# Patient Record
Sex: FEMALE | Race: WHITE | Employment: OTHER | URBAN - METROPOLITAN AREA
[De-identification: names, ages, dates, MRNs, and addresses within clinical notes are randomized per-mention and may not be internally consistent; named-entity substitution may affect disease eponyms.]

---

## 2023-10-10 ENCOUNTER — HOSPITAL ENCOUNTER (EMERGENCY)
Age: 58
Discharge: HOME | End: 2023-10-10
Payer: MEDICARE

## 2023-10-10 ENCOUNTER — APPOINTMENT (OUTPATIENT)
Dept: GENERAL RADIOLOGY | Age: 58
End: 2023-10-10
Payer: MEDICARE

## 2023-10-10 VITALS
OXYGEN SATURATION: 93 % | SYSTOLIC BLOOD PRESSURE: 134 MMHG | HEART RATE: 79 BPM | DIASTOLIC BLOOD PRESSURE: 86 MMHG | TEMPERATURE: 98.1 F | RESPIRATION RATE: 16 BRPM

## 2023-10-10 DIAGNOSIS — M79.672 ACUTE PAIN OF LEFT FOOT: Primary | ICD-10-CM

## 2023-10-10 DIAGNOSIS — M25.572 ACUTE LEFT ANKLE PAIN: ICD-10-CM

## 2023-10-10 PROCEDURE — 6370000000 HC RX 637 (ALT 250 FOR IP): Performed by: NURSE PRACTITIONER

## 2023-10-10 PROCEDURE — 99283 EMERGENCY DEPT VISIT LOW MDM: CPT

## 2023-10-10 PROCEDURE — 73610 X-RAY EXAM OF ANKLE: CPT

## 2023-10-10 PROCEDURE — 73630 X-RAY EXAM OF FOOT: CPT

## 2023-10-10 RX ORDER — IBUPROFEN 200 MG
200 TABLET ORAL ONCE
Status: COMPLETED | OUTPATIENT
Start: 2023-10-10 | End: 2023-10-10

## 2023-10-10 RX ORDER — ACETAMINOPHEN 500 MG
500 TABLET ORAL 4 TIMES DAILY PRN
Qty: 28 TABLET | Refills: 0 | Status: SHIPPED | OUTPATIENT
Start: 2023-10-10 | End: 2023-10-17

## 2023-10-10 RX ORDER — IBUPROFEN 600 MG/1
600 TABLET ORAL 3 TIMES DAILY PRN
Qty: 15 TABLET | Refills: 0 | Status: SHIPPED | OUTPATIENT
Start: 2023-10-10 | End: 2023-10-15

## 2023-10-10 RX ORDER — ACETAMINOPHEN 325 MG/1
650 TABLET ORAL ONCE
Status: COMPLETED | OUTPATIENT
Start: 2023-10-10 | End: 2023-10-10

## 2023-10-10 RX ADMIN — ACETAMINOPHEN 650 MG: 325 TABLET ORAL at 14:10

## 2023-10-10 RX ADMIN — IBUPROFEN 200 MG: 200 TABLET, FILM COATED ORAL at 14:10

## 2023-10-10 ASSESSMENT — PAIN DESCRIPTION - LOCATION
LOCATION: FOOT
LOCATION: FOOT

## 2023-10-10 ASSESSMENT — PAIN DESCRIPTION - FREQUENCY: FREQUENCY: CONTINUOUS

## 2023-10-10 ASSESSMENT — PAIN SCALES - GENERAL
PAINLEVEL_OUTOF10: 7
PAINLEVEL_OUTOF10: 10
PAINLEVEL_OUTOF10: 10

## 2023-10-10 ASSESSMENT — PAIN DESCRIPTION - PAIN TYPE: TYPE: ACUTE PAIN

## 2023-10-10 ASSESSMENT — PAIN DESCRIPTION - ORIENTATION
ORIENTATION: LEFT
ORIENTATION: LEFT

## 2023-10-10 ASSESSMENT — PAIN DESCRIPTION - DESCRIPTORS
DESCRIPTORS: SHARP;PRESSURE
DESCRIPTORS: DISCOMFORT

## 2023-10-10 ASSESSMENT — PAIN - FUNCTIONAL ASSESSMENT: PAIN_FUNCTIONAL_ASSESSMENT: 0-10

## 2023-10-10 NOTE — ED NOTES
A post op shoe was put on patients Left foot. Patient was also given crutches and verbalized understanding and also demonstrated how to use them.       Hudson Fabry, LPN  31/19/55 6873

## 2023-10-10 NOTE — DISCHARGE INSTRUCTIONS
Return to the emergency department for new or worsening symptoms including not limited to, developing worsening pain not relieved by medications, change in the color or temperature of your toes, loss sensation in your leg/foot, other symptoms/concerns. Medications as prescribed. Ensure that you eat prior to taking ibuprofen as this is an NSAID medication that can otherwise cause gastrointestinal bleeding/stomach ulcers if taken on empty stomach. Utilize RICE. Weightbearing as tolerated. Follow-up with your orthopedic specialist or with the provided orthopedic specialist for symptoms that worsen or fail to improve in the next 1 week.

## 2023-10-10 NOTE — ED NOTES
Discharge and education instructions reviewed. Patient verbalized understanding, teach-back successful. Patient denied questions at this time. No acute distress noted. Patient instructed to follow-up as noted - return to emergency department if symptoms worsen. Patient verbalized understanding. Discharged per EDMD with discharge instructions.        Stanislav Figueroa LPN  05/62/13 9554

## 2023-10-11 NOTE — ED PROVIDER NOTES
[address] [facility] [address] [address]        Pt Name: Anya Beyer  MRN: [id number]  [address] [facility] 1965  Date of evaluation: 10/10/2023  Provider: MARY Yadav CNP  PCP: Faustino Blankenship  Note Started: 9:48 PM EDT 10/10/23      MATA. I have evaluated this patient. CHIEF COMPLAINT       Chief Complaint   Patient presents with    Foot Injury     Left foot pain       HISTORY OF PRESENT ILLNESS: 1 or more Elements     History from : Patient    Limitations to history : None    Anya Beyer is a 61 y.o. nontoxic, well-appearing female who presents to the [facility] from for evaluation of left foot and ankle pain status post she jammed her foot against a piece of furniture. Accompanying symptoms include gait disturbance. She denies falling, head injury, loss of consciousness, neck or back pain, other symptoms/concerns. Pain radiates up from her left foot to knee. Nursing Notes were all reviewed and agreed with or any disagreements were addressed in the HPI. REVIEW OF SYSTEMS :      Review of Systems   Constitutional:  Negative for chills, diaphoresis, fatigue and fever. HENT:  Negative for congestion and sore throat. Eyes:  Negative for pain and visual disturbance. Respiratory:  Negative for cough and shortness of breath. Cardiovascular:  Negative for chest pain and leg swelling. Gastrointestinal:  Negative for abdominal pain, anal bleeding, nausea and vomiting. Genitourinary:  Negative for difficulty urinating, dysuria, frequency and urgency. Musculoskeletal:  Positive for arthralgias and gait problem. Negative for back pain and neck pain. Skin:  Negative for rash and wound. Neurological:  Negative for dizziness and light-headedness. Hematological: Negative. Psychiatric/Behavioral:  Positive for agitation. Positives and Pertinent negatives as per HPI.      SURGICAL HISTORY     Past Surgical History:   Procedure

## 2023-10-14 ASSESSMENT — ENCOUNTER SYMPTOMS
EYE PAIN: 0
BACK PAIN: 0
ABDOMINAL PAIN: 0
COUGH: 0
SHORTNESS OF BREATH: 0
ANAL BLEEDING: 0
SORE THROAT: 0
VOMITING: 0
NAUSEA: 0

## 2023-10-27 ENCOUNTER — OFFICE VISIT (OUTPATIENT)
Dept: INTERNAL MEDICINE CLINIC | Age: 58
End: 2023-10-27
Payer: MEDICARE

## 2023-10-27 VITALS
SYSTOLIC BLOOD PRESSURE: 161 MMHG | HEART RATE: 63 BPM | HEIGHT: 69 IN | DIASTOLIC BLOOD PRESSURE: 100 MMHG | BODY MASS INDEX: 21.77 KG/M2 | WEIGHT: 147 LBS

## 2023-10-27 DIAGNOSIS — F41.1 GAD (GENERALIZED ANXIETY DISORDER): ICD-10-CM

## 2023-10-27 DIAGNOSIS — Z98.890 S/P ARTHROSCOPY OF SHOULDER: ICD-10-CM

## 2023-10-27 DIAGNOSIS — M19.019 ACROMIOCLAVICULAR JOINT ARTHRITIS, UNSPECIFIED LATERALITY: Primary | ICD-10-CM

## 2023-10-27 PROCEDURE — G8420 CALC BMI NORM PARAMETERS: HCPCS | Performed by: STUDENT IN AN ORGANIZED HEALTH CARE EDUCATION/TRAINING PROGRAM

## 2023-10-27 PROCEDURE — 1036F TOBACCO NON-USER: CPT | Performed by: STUDENT IN AN ORGANIZED HEALTH CARE EDUCATION/TRAINING PROGRAM

## 2023-10-27 PROCEDURE — G8484 FLU IMMUNIZE NO ADMIN: HCPCS | Performed by: STUDENT IN AN ORGANIZED HEALTH CARE EDUCATION/TRAINING PROGRAM

## 2023-10-27 PROCEDURE — 99213 OFFICE O/P EST LOW 20 MIN: CPT | Performed by: STUDENT IN AN ORGANIZED HEALTH CARE EDUCATION/TRAINING PROGRAM

## 2023-10-27 PROCEDURE — 3017F COLORECTAL CA SCREEN DOC REV: CPT | Performed by: STUDENT IN AN ORGANIZED HEALTH CARE EDUCATION/TRAINING PROGRAM

## 2023-10-27 PROCEDURE — G8427 DOCREV CUR MEDS BY ELIG CLIN: HCPCS | Performed by: STUDENT IN AN ORGANIZED HEALTH CARE EDUCATION/TRAINING PROGRAM

## 2023-10-27 RX ORDER — OXYCODONE HYDROCHLORIDE AND ACETAMINOPHEN 5; 325 MG/1; MG/1
1-2 TABLET ORAL EVERY 8 HOURS PRN
Qty: 90 TABLET | Refills: 0 | Status: SHIPPED | OUTPATIENT
Start: 2023-10-27 | End: 2023-11-26

## 2023-10-27 RX ORDER — RIZATRIPTAN BENZOATE 10 MG/1
10 TABLET ORAL DAILY PRN
Qty: 30 TABLET | Refills: 4 | Status: SHIPPED | OUTPATIENT
Start: 2023-10-27

## 2023-10-27 RX ORDER — CLONIDINE HYDROCHLORIDE 0.3 MG/1
0.3 TABLET ORAL 2 TIMES DAILY
Qty: 60 TABLET | Refills: 11 | Status: SHIPPED | OUTPATIENT
Start: 2023-10-27

## 2023-10-27 RX ORDER — GABAPENTIN 600 MG/1
600 TABLET ORAL 3 TIMES DAILY
Qty: 90 TABLET | Refills: 11 | Status: SHIPPED | OUTPATIENT
Start: 2023-10-27 | End: 2024-10-21

## 2023-10-27 RX ORDER — METHOCARBAMOL 500 MG/1
500 TABLET, FILM COATED ORAL 4 TIMES DAILY
Qty: 120 TABLET | Refills: 5 | Status: SHIPPED | OUTPATIENT
Start: 2023-10-27

## 2023-10-27 RX ORDER — IBUPROFEN 600 MG/1
600 TABLET ORAL 3 TIMES DAILY PRN
Qty: 15 TABLET | Refills: 0 | Status: SHIPPED | OUTPATIENT
Start: 2023-10-27 | End: 2023-11-01

## 2023-10-27 RX ORDER — NAPROXEN 500 MG/1
500 TABLET ORAL 2 TIMES DAILY
Qty: 20 TABLET | Refills: 0 | Status: CANCELLED | OUTPATIENT
Start: 2023-10-27 | End: 2023-11-06

## 2023-10-27 RX ORDER — PREDNISONE 10 MG/1
TABLET ORAL
Qty: 42 TABLET | Refills: 0 | Status: SHIPPED | OUTPATIENT
Start: 2023-10-27 | End: 2023-11-15

## 2023-10-27 RX ORDER — AMITRIPTYLINE HYDROCHLORIDE 50 MG/1
50 TABLET, FILM COATED ORAL NIGHTLY
Qty: 30 TABLET | Refills: 11 | Status: SHIPPED | OUTPATIENT
Start: 2023-10-27 | End: 2023-10-27

## 2023-10-27 RX ORDER — DIAZEPAM 5 MG/1
5 TABLET ORAL EVERY 8 HOURS PRN
Qty: 90 TABLET | Refills: 2 | Status: SHIPPED | OUTPATIENT
Start: 2023-10-27 | End: 2023-11-30 | Stop reason: SDUPTHER

## 2023-10-27 RX ORDER — NORTRIPTYLINE HYDROCHLORIDE 25 MG/1
25 CAPSULE ORAL NIGHTLY
Qty: 90 CAPSULE | Refills: 3 | Status: SHIPPED | OUTPATIENT
Start: 2023-10-27

## 2023-10-27 SDOH — ECONOMIC STABILITY: HOUSING INSECURITY
IN THE LAST 12 MONTHS, WAS THERE A TIME WHEN YOU DID NOT HAVE A STEADY PLACE TO SLEEP OR SLEPT IN A SHELTER (INCLUDING NOW)?: NO

## 2023-10-27 SDOH — ECONOMIC STABILITY: INCOME INSECURITY: HOW HARD IS IT FOR YOU TO PAY FOR THE VERY BASICS LIKE FOOD, HOUSING, MEDICAL CARE, AND HEATING?: NOT HARD AT ALL

## 2023-10-27 SDOH — ECONOMIC STABILITY: FOOD INSECURITY: WITHIN THE PAST 12 MONTHS, THE FOOD YOU BOUGHT JUST DIDN'T LAST AND YOU DIDN'T HAVE MONEY TO GET MORE.: NEVER TRUE

## 2023-10-27 SDOH — ECONOMIC STABILITY: FOOD INSECURITY: WITHIN THE PAST 12 MONTHS, YOU WORRIED THAT YOUR FOOD WOULD RUN OUT BEFORE YOU GOT MONEY TO BUY MORE.: NEVER TRUE

## 2023-10-27 ASSESSMENT — PATIENT HEALTH QUESTIONNAIRE - PHQ9
SUM OF ALL RESPONSES TO PHQ QUESTIONS 1-9: 0
1. LITTLE INTEREST OR PLEASURE IN DOING THINGS: 0
SUM OF ALL RESPONSES TO PHQ QUESTIONS 1-9: 0
SUM OF ALL RESPONSES TO PHQ9 QUESTIONS 1 & 2: 0
SUM OF ALL RESPONSES TO PHQ QUESTIONS 1-9: 0
2. FEELING DOWN, DEPRESSED OR HOPELESS: 0
SUM OF ALL RESPONSES TO PHQ QUESTIONS 1-9: 0

## 2023-10-27 ASSESSMENT — ENCOUNTER SYMPTOMS
BACK PAIN: 0
DIARRHEA: 0
VOMITING: 0
NAUSEA: 0
COUGH: 0
CONSTIPATION: 0
ABDOMINAL PAIN: 0
CHEST TIGHTNESS: 0
SORE THROAT: 0

## 2023-10-27 NOTE — PROGRESS NOTES
[facility]) Internal Medicine    Maricruz Dickey is a 61 y.o. female with the past medical history as listed below presenting to the clinic for follow up on chronic condition and discussion of preventative health care  She has chronic pain from multiple surgeries. She is does have ankle pain and is currently wearing a boot. She reports she has surgery to her ankle and has hardware in her ankle. She also notes anxiety and panic attacks. Review of Systems   Constitutional:  Negative for chills, fatigue and fever. HENT:  Negative for congestion, ear pain and sore throat. Respiratory:  Negative for cough and chest tightness. Cardiovascular:  Negative for chest pain, palpitations and leg swelling. Gastrointestinal:  Negative for abdominal pain, constipation, diarrhea, nausea and vomiting. Genitourinary:  Negative for dysuria, flank pain and urgency. Musculoskeletal:  Negative for arthralgias, back pain and joint swelling. Skin:  Negative for rash. Neurological:  Negative for dizziness, weakness and numbness. Psychiatric/Behavioral:  Negative for sleep disturbance. The patient is not nervous/anxious.         Past Medical History:   Diagnosis Date    Anxiety     Arthritis     Asthma     Hypertension     Neuropathy        Past Surgical History:   Procedure Laterality Date     SECTION      SHOULDER ARTHROSCOPY Right 10/28/15    labral debridement open shanna & decompression    TONSILLECTOMY         Social History     Socioeconomic History    Marital status:      Spouse name: Not on file    Number of children: 4    Years of education: Not on file    Highest education level: Not on file   Occupational History    Occupation: Disabled   Tobacco Use    Smoking status: Never    Smokeless tobacco: Not on file   Substance and Sexual Activity    Alcohol use: No     Alcohol/week: 0.0 standard drinks of alcohol    Drug use: No    Sexual activity: Not on file   Other Topics Concern    Not on file

## 2023-11-09 DIAGNOSIS — F41.1 GAD (GENERALIZED ANXIETY DISORDER): ICD-10-CM

## 2023-11-09 DIAGNOSIS — M19.019 ACROMIOCLAVICULAR JOINT ARTHRITIS, UNSPECIFIED LATERALITY: ICD-10-CM

## 2023-11-09 DIAGNOSIS — Z98.890 S/P ARTHROSCOPY OF SHOULDER: ICD-10-CM

## 2023-11-09 RX ORDER — OXYCODONE HYDROCHLORIDE AND ACETAMINOPHEN 5; 325 MG/1; MG/1
1-2 TABLET ORAL EVERY 8 HOURS PRN
Qty: 90 TABLET | Refills: 0 | Status: CANCELLED | OUTPATIENT
Start: 2023-11-09 | End: 2023-12-09

## 2023-11-09 NOTE — TELEPHONE ENCOUNTER
Pt is requesting refills for:    oxyCODONE-acetaminophen (PERCOCET) 5-325 MG per tablet    diazePAM (VALIUM) 5 MG tablet    Please send to [facility]

## 2023-11-15 PROBLEM — F33.1 MAJOR DEPRESSIVE DISORDER, RECURRENT, MODERATE (HCC): Status: ACTIVE | Noted: 2023-11-15

## 2023-11-15 PROBLEM — F33.9 MAJOR DEPRESSIVE DISORDER, RECURRENT, UNSPECIFIED (HCC): Status: ACTIVE | Noted: 2023-11-15

## 2023-11-15 PROBLEM — F33.0 MAJOR DEPRESSIVE DISORDER, RECURRENT, MILD (HCC): Status: ACTIVE | Noted: 2023-11-15

## 2023-11-30 DIAGNOSIS — Z98.890 S/P ARTHROSCOPY OF SHOULDER: ICD-10-CM

## 2023-11-30 DIAGNOSIS — M19.019 ACROMIOCLAVICULAR JOINT ARTHRITIS, UNSPECIFIED LATERALITY: ICD-10-CM

## 2023-11-30 RX ORDER — DIAZEPAM 5 MG/1
5 TABLET ORAL EVERY 8 HOURS PRN
Qty: 90 TABLET | Refills: 2 | Status: SHIPPED | OUTPATIENT
Start: 2023-11-30 | End: 2024-02-28

## 2023-11-30 RX ORDER — OXYCODONE AND ACETAMINOPHEN 10; 325 MG/1; MG/1
1 TABLET ORAL EVERY 6 HOURS PRN
Qty: 90 TABLET | Refills: 0 | OUTPATIENT
Start: 2023-11-30 | End: 2023-12-30

## 2023-11-30 NOTE — TELEPHONE ENCOUNTER
----- Message from Angela Suh sent at 11/29/2023  4:27 PM EST -----  Subject: Refill Request    QUESTIONS  Name of Medication? oxyCODONE-acetaminophen (ENDOCET)  MG per tablet  Patient-reported dosage and instructions? 10MG  How many days do you have left? 1  Preferred Pharmacy? [facility] [id number]  Pharmacy phone number (if available)? [phone]  ---------------------------------------------------------------------------  --------------  Austin LOPEZ  What is the best way for the office to contact you? OK to leave message on   voicemail  Preferred Call Back Phone Number? [phone]  ---------------------------------------------------------------------------  --------------  SCRIPT ANSWERS  Relationship to Patient?  Self

## 2023-12-05 ENCOUNTER — OFFICE VISIT (OUTPATIENT)
Dept: INTERNAL MEDICINE CLINIC | Age: 58
End: 2023-12-05
Payer: MEDICARE

## 2023-12-05 VITALS — HEART RATE: 70 BPM | OXYGEN SATURATION: 94 % | SYSTOLIC BLOOD PRESSURE: 130 MMHG | DIASTOLIC BLOOD PRESSURE: 90 MMHG

## 2023-12-05 DIAGNOSIS — M51.36 DEGENERATIVE DISC DISEASE, LUMBAR: ICD-10-CM

## 2023-12-05 DIAGNOSIS — Z98.890 S/P ARTHROSCOPY OF SHOULDER: ICD-10-CM

## 2023-12-05 DIAGNOSIS — M19.019 ACROMIOCLAVICULAR JOINT ARTHRITIS, UNSPECIFIED LATERALITY: ICD-10-CM

## 2023-12-05 DIAGNOSIS — M79.605 CHRONIC PAIN OF LEFT LOWER EXTREMITY: Primary | ICD-10-CM

## 2023-12-05 DIAGNOSIS — I10 PRIMARY HYPERTENSION: ICD-10-CM

## 2023-12-05 DIAGNOSIS — G89.29 CHRONIC PAIN OF LEFT LOWER EXTREMITY: Primary | ICD-10-CM

## 2023-12-05 PROBLEM — M51.369 DEGENERATIVE DISC DISEASE, LUMBAR: Status: ACTIVE | Noted: 2023-12-05

## 2023-12-05 PROCEDURE — G8484 FLU IMMUNIZE NO ADMIN: HCPCS | Performed by: STUDENT IN AN ORGANIZED HEALTH CARE EDUCATION/TRAINING PROGRAM

## 2023-12-05 PROCEDURE — G8427 DOCREV CUR MEDS BY ELIG CLIN: HCPCS | Performed by: STUDENT IN AN ORGANIZED HEALTH CARE EDUCATION/TRAINING PROGRAM

## 2023-12-05 PROCEDURE — 99213 OFFICE O/P EST LOW 20 MIN: CPT | Performed by: STUDENT IN AN ORGANIZED HEALTH CARE EDUCATION/TRAINING PROGRAM

## 2023-12-05 PROCEDURE — G8420 CALC BMI NORM PARAMETERS: HCPCS | Performed by: STUDENT IN AN ORGANIZED HEALTH CARE EDUCATION/TRAINING PROGRAM

## 2023-12-05 PROCEDURE — 3017F COLORECTAL CA SCREEN DOC REV: CPT | Performed by: STUDENT IN AN ORGANIZED HEALTH CARE EDUCATION/TRAINING PROGRAM

## 2023-12-05 PROCEDURE — 3075F SYST BP GE 130 - 139MM HG: CPT | Performed by: STUDENT IN AN ORGANIZED HEALTH CARE EDUCATION/TRAINING PROGRAM

## 2023-12-05 PROCEDURE — 3080F DIAST BP >= 90 MM HG: CPT | Performed by: STUDENT IN AN ORGANIZED HEALTH CARE EDUCATION/TRAINING PROGRAM

## 2023-12-05 PROCEDURE — 1036F TOBACCO NON-USER: CPT | Performed by: STUDENT IN AN ORGANIZED HEALTH CARE EDUCATION/TRAINING PROGRAM

## 2023-12-05 RX ORDER — CLONIDINE HYDROCHLORIDE 0.3 MG/1
0.3 TABLET ORAL 2 TIMES DAILY
Qty: 60 TABLET | Refills: 11 | Status: SHIPPED | OUTPATIENT
Start: 2023-12-05

## 2023-12-05 RX ORDER — OXYCODONE HYDROCHLORIDE 10 MG/1
10 TABLET ORAL EVERY 6 HOURS PRN
Qty: 120 TABLET | Refills: 0 | Status: SHIPPED | OUTPATIENT
Start: 2023-12-05 | End: 2024-01-04

## 2023-12-05 RX ORDER — OXYCODONE HYDROCHLORIDE 10 MG/1
10 TABLET ORAL EVERY 6 HOURS PRN
Qty: 120 TABLET | Refills: 0 | Status: SHIPPED | OUTPATIENT
Start: 2023-12-13 | End: 2023-12-05 | Stop reason: SDUPTHER

## 2023-12-05 ASSESSMENT — ENCOUNTER SYMPTOMS
VOMITING: 0
BACK PAIN: 0
DIARRHEA: 0
NAUSEA: 0
ABDOMINAL PAIN: 0
CONSTIPATION: 0
SORE THROAT: 0
COUGH: 0
CHEST TIGHTNESS: 0

## 2023-12-05 NOTE — PROGRESS NOTES
[facility]) Internal Medicine    Mauri Rosa is a 61 y.o. female with the past medical history as listed below presenting to the clinic for follow up on chronic condition and discussion of preventative health care. She continues to have diffuse pain. She has pain in left foot and wears a boot. She has not followed with ortho [facility] to remove boot because she owes them money. She has chronic back pain and reports having a oscar in her back. She is adamant that she will not go to pain management. She reports he blood pressure has been elevated due to her pain. She is worried that she will have a stroke. Review of Systems   Constitutional:  Negative for chills, fatigue and fever. HENT:  Negative for congestion, ear pain and sore throat. Respiratory:  Negative for cough and chest tightness. Cardiovascular:  Negative for chest pain, palpitations and leg swelling. Gastrointestinal:  Negative for abdominal pain, constipation, diarrhea, nausea and vomiting. Genitourinary:  Negative for dysuria, flank pain and urgency. Musculoskeletal:  Negative for arthralgias, back pain and joint swelling. Skin:  Negative for rash. Neurological:  Negative for dizziness, weakness and numbness. Psychiatric/Behavioral:  Negative for sleep disturbance. The patient is not nervous/anxious.         Past Medical History:   Diagnosis Date    Anxiety     Arthritis     Asthma     Hypertension     Neuropathy        Past Surgical History:   Procedure Laterality Date     SECTION      SHOULDER ARTHROSCOPY Right 10/28/15    labral debridement open shanna & decompression    TONSILLECTOMY         Social History     Socioeconomic History    Marital status:      Spouse name: Not on file    Number of children: 4    Years of education: Not on file    Highest education level: Not on file   Occupational History    Occupation: Disabled   Tobacco Use    Smoking status: Never    Smokeless tobacco: Not on file   Substance and
No

## 2023-12-08 ENCOUNTER — TELEPHONE (OUTPATIENT)
Dept: INTERNAL MEDICINE CLINIC | Age: 58
End: 2023-12-08

## 2023-12-08 NOTE — TELEPHONE ENCOUNTER
Pt called, needs new Rx for her diazePAM (VALIUM) 5 MG tablet, needs to say she is taking 30mg per day    Also needs a refill for her nortriptyline (PAMELOR) 25 MG capsule, would like to Rx changes to 50mg at night instead     Please advise    Please send to [facility] in [place]

## 2023-12-11 NOTE — TELEPHONE ENCOUNTER
She's about 14 days too early. The prescriptions is written for 5 mg 3 times a day which is 15 mg total per day.

## 2023-12-22 ENCOUNTER — OFFICE VISIT (OUTPATIENT)
Dept: INTERNAL MEDICINE CLINIC | Age: 58
End: 2023-12-22
Payer: MEDICARE

## 2023-12-22 VITALS
SYSTOLIC BLOOD PRESSURE: 118 MMHG | HEIGHT: 69 IN | BODY MASS INDEX: 22.22 KG/M2 | WEIGHT: 150 LBS | HEART RATE: 59 BPM | OXYGEN SATURATION: 94 % | DIASTOLIC BLOOD PRESSURE: 84 MMHG

## 2023-12-22 DIAGNOSIS — M51.36 DEGENERATIVE DISC DISEASE, LUMBAR: ICD-10-CM

## 2023-12-22 DIAGNOSIS — Z98.890 S/P ARTHROSCOPY OF SHOULDER: ICD-10-CM

## 2023-12-22 DIAGNOSIS — M19.019 ACROMIOCLAVICULAR JOINT ARTHRITIS, UNSPECIFIED LATERALITY: ICD-10-CM

## 2023-12-22 DIAGNOSIS — M79.605 CHRONIC PAIN OF LEFT LOWER EXTREMITY: ICD-10-CM

## 2023-12-22 DIAGNOSIS — G89.29 CHRONIC PAIN OF LEFT LOWER EXTREMITY: ICD-10-CM

## 2023-12-22 DIAGNOSIS — G43.109 MIGRAINE WITH AURA AND WITHOUT STATUS MIGRAINOSUS, NOT INTRACTABLE: Primary | ICD-10-CM

## 2023-12-22 DIAGNOSIS — F41.1 GAD (GENERALIZED ANXIETY DISORDER): ICD-10-CM

## 2023-12-22 PROCEDURE — 99213 OFFICE O/P EST LOW 20 MIN: CPT | Performed by: STUDENT IN AN ORGANIZED HEALTH CARE EDUCATION/TRAINING PROGRAM

## 2023-12-22 PROCEDURE — 3074F SYST BP LT 130 MM HG: CPT | Performed by: STUDENT IN AN ORGANIZED HEALTH CARE EDUCATION/TRAINING PROGRAM

## 2023-12-22 PROCEDURE — 3017F COLORECTAL CA SCREEN DOC REV: CPT | Performed by: STUDENT IN AN ORGANIZED HEALTH CARE EDUCATION/TRAINING PROGRAM

## 2023-12-22 PROCEDURE — G8484 FLU IMMUNIZE NO ADMIN: HCPCS | Performed by: STUDENT IN AN ORGANIZED HEALTH CARE EDUCATION/TRAINING PROGRAM

## 2023-12-22 PROCEDURE — G8427 DOCREV CUR MEDS BY ELIG CLIN: HCPCS | Performed by: STUDENT IN AN ORGANIZED HEALTH CARE EDUCATION/TRAINING PROGRAM

## 2023-12-22 PROCEDURE — 3079F DIAST BP 80-89 MM HG: CPT | Performed by: STUDENT IN AN ORGANIZED HEALTH CARE EDUCATION/TRAINING PROGRAM

## 2023-12-22 PROCEDURE — 1036F TOBACCO NON-USER: CPT | Performed by: STUDENT IN AN ORGANIZED HEALTH CARE EDUCATION/TRAINING PROGRAM

## 2023-12-22 PROCEDURE — G8420 CALC BMI NORM PARAMETERS: HCPCS | Performed by: STUDENT IN AN ORGANIZED HEALTH CARE EDUCATION/TRAINING PROGRAM

## 2023-12-22 RX ORDER — CLONIDINE HYDROCHLORIDE 0.3 MG/1
0.3 TABLET ORAL 2 TIMES DAILY
Qty: 60 TABLET | Refills: 11 | Status: SHIPPED | OUTPATIENT
Start: 2023-12-22

## 2023-12-22 RX ORDER — METHOCARBAMOL 500 MG/1
500 TABLET, FILM COATED ORAL 4 TIMES DAILY
Qty: 120 TABLET | Refills: 5 | Status: CANCELLED | OUTPATIENT
Start: 2023-12-22

## 2023-12-22 RX ORDER — DIAZEPAM 5 MG/1
7.5 TABLET ORAL EVERY 8 HOURS PRN
Qty: 120 TABLET | Refills: 2 | Status: SHIPPED | OUTPATIENT
Start: 2023-12-22 | End: 2024-03-21

## 2023-12-22 RX ORDER — GABAPENTIN 600 MG/1
600 TABLET ORAL 3 TIMES DAILY
Qty: 90 TABLET | Refills: 11 | Status: SHIPPED | OUTPATIENT
Start: 2023-12-22 | End: 2024-12-16

## 2023-12-22 RX ORDER — NORTRIPTYLINE HYDROCHLORIDE 25 MG/1
25 CAPSULE ORAL NIGHTLY
Qty: 90 CAPSULE | Refills: 3 | Status: SHIPPED | OUTPATIENT
Start: 2023-12-22

## 2023-12-22 RX ORDER — OXYCODONE HYDROCHLORIDE 10 MG/1
10 TABLET ORAL EVERY 6 HOURS PRN
Qty: 120 TABLET | Refills: 0 | Status: CANCELLED | OUTPATIENT
Start: 2023-12-22 | End: 2024-01-21

## 2023-12-22 RX ORDER — IBUPROFEN 600 MG/1
600 TABLET ORAL 3 TIMES DAILY PRN
Qty: 60 TABLET | Refills: 0 | Status: SHIPPED | OUTPATIENT
Start: 2023-12-22 | End: 2024-01-21

## 2023-12-22 RX ORDER — RIZATRIPTAN BENZOATE 10 MG/1
10 TABLET ORAL DAILY PRN
Qty: 30 TABLET | Refills: 4 | Status: SHIPPED | OUTPATIENT
Start: 2023-12-22

## 2023-12-29 ENCOUNTER — TELEPHONE (OUTPATIENT)
Dept: INTERNAL MEDICINE CLINIC | Age: 58
End: 2023-12-29

## 2023-12-29 NOTE — TELEPHONE ENCOUNTER
Pt was transferred through nurse triage line for head pain and suicidal thoughts. I spoke with pt and she is refusing to get to the ER just because she \"can't do it anymore\" everything is a mess per pt.      Asked her if she is hurting herself or having thoughts pt denied this with me. Told pt I will call Squad if she is having suicidal thoughts and she denied. Pts friend Aleshia who is also a pt here said pt hasn't ate more than 3 french fries today and doesn't have an appetite after losing her .       Pt wants to know if she can come in next Friday 1/5/24 at 2:30 with Aleshia Burr? Told pt there were no opening next week with PCP so I would have to ask. Pt is unsure why she would be coming in. I told pt she needs to make sure she is around family and friends and finding some support groups.      Are we able to see pt next Friday at 230?

## 2023-12-30 NOTE — PROGRESS NOTES
[facility]) Internal Medicine    Marilny Gallagher is a 60 y.o. female with the past medical history as listed below presenting to the clinic for follow up on chronic condition and discussion of preventative health care. She continues to have diffuse pain. She has pain in left foot and wears a boot. She has not followed with ortho [facility] to remove boot because she owes them money. She has chronic back pain and reports having a oscar in her back. She is adamant that she will not go to pain management. She reports he blood pressure has been elevated due to her pain. She is worried that she will have a stroke. Review of Systems   Constitutional:  Negative for chills, fatigue and fever. HENT:  Negative for congestion, ear pain and sore throat. Respiratory:  Negative for cough and chest tightness. Cardiovascular:  Negative for chest pain, palpitations and leg swelling. Gastrointestinal:  Negative for abdominal pain, constipation, diarrhea, nausea and vomiting. Genitourinary:  Negative for dysuria, flank pain and urgency. Musculoskeletal:  Negative for arthralgias, back pain and joint swelling. Skin:  Negative for rash. Neurological:  Negative for dizziness, weakness and numbness. Psychiatric/Behavioral:  Negative for sleep disturbance. The patient is not nervous/anxious.         Past Medical History:   Diagnosis Date    Anxiety     Arthritis     Asthma     Hypertension     Neuropathy        Past Surgical History:   Procedure Laterality Date     SECTION      SHOULDER ARTHROSCOPY Right 10/28/15    labral debridement open shanna & decompression    TONSILLECTOMY         Social History     Socioeconomic History    Marital status:      Spouse name: Not on file    Number of children: 4    Years of education: Not on file    Highest education level: Not on file   Occupational History    Occupation: Disabled   Tobacco Use    Smoking status: Never    Smokeless tobacco: Never   Substance and Sexual

## 2024-01-02 DIAGNOSIS — M19.019 ACROMIOCLAVICULAR JOINT ARTHRITIS, UNSPECIFIED LATERALITY: ICD-10-CM

## 2024-01-02 DIAGNOSIS — M79.605 CHRONIC PAIN OF LEFT LOWER EXTREMITY: ICD-10-CM

## 2024-01-02 DIAGNOSIS — Z98.890 S/P ARTHROSCOPY OF SHOULDER: ICD-10-CM

## 2024-01-02 DIAGNOSIS — M51.36 DEGENERATIVE DISC DISEASE, LUMBAR: ICD-10-CM

## 2024-01-02 DIAGNOSIS — G89.29 CHRONIC PAIN OF LEFT LOWER EXTREMITY: ICD-10-CM

## 2024-01-02 RX ORDER — OXYCODONE HYDROCHLORIDE 10 MG/1
10 TABLET ORAL EVERY 6 HOURS PRN
Qty: 120 TABLET | Refills: 0 | Status: SHIPPED | OUTPATIENT
Start: 2024-01-02 | End: 2024-02-01

## 2024-01-03 DIAGNOSIS — G89.29 CHRONIC PAIN OF LEFT LOWER EXTREMITY: ICD-10-CM

## 2024-01-03 DIAGNOSIS — Z98.890 S/P ARTHROSCOPY OF SHOULDER: ICD-10-CM

## 2024-01-03 DIAGNOSIS — F41.1 GAD (GENERALIZED ANXIETY DISORDER): ICD-10-CM

## 2024-01-03 DIAGNOSIS — M51.36 DEGENERATIVE DISC DISEASE, LUMBAR: ICD-10-CM

## 2024-01-03 DIAGNOSIS — M79.605 CHRONIC PAIN OF LEFT LOWER EXTREMITY: ICD-10-CM

## 2024-01-03 DIAGNOSIS — G43.109 MIGRAINE WITH AURA AND WITHOUT STATUS MIGRAINOSUS, NOT INTRACTABLE: ICD-10-CM

## 2024-01-03 DIAGNOSIS — M19.019 ACROMIOCLAVICULAR JOINT ARTHRITIS, UNSPECIFIED LATERALITY: ICD-10-CM

## 2024-01-03 NOTE — TELEPHONE ENCOUNTER
----- Message from Muna Murdock sent at 1/2/2024  5:24 PM EST -----  Subject: Message to Provider    QUESTIONS  Information for Provider? Atul rodriguezan Aleshia was calling to   let Provider Iraj know that He can call this number back for   medication refills due to patient gave wrong number earlier. Please call,   thanks  ---------------------------------------------------------------------------  --------------  CALL BACK INFO  [phone]; OK to leave message on voicemail  ---------------------------------------------------------------------------  --------------  SCRIPT ANSWERS  Relationship to Patient? Guardian  Representative Name? Lesia LAUREANO  Is the representative on the Communication Release of Information (LIZETH)   form in Epic? Yes

## 2024-01-13 DIAGNOSIS — M51.36 DEGENERATIVE DISC DISEASE, LUMBAR: ICD-10-CM

## 2024-01-15 RX ORDER — IBUPROFEN 600 MG/1
TABLET ORAL
Qty: 60 TABLET | Refills: 0 | Status: SHIPPED | OUTPATIENT
Start: 2024-01-15

## 2024-01-15 NOTE — TELEPHONE ENCOUNTER
Last office visit 12/22/2023       Next office visit scheduled Visit date not found    Requested Prescriptions     Pending Prescriptions Disp Refills     MG tablet [Pharmacy Med Name: IBUPROFEN 600 MG TABLET] 60 tablet 0     Sig: TAKE ONE TABLET BY MOUTH THREE TIMES A DAY WITH A MEAL AS NEEDED FOR PAIN

## 2024-01-18 RX ORDER — CLONIDINE HYDROCHLORIDE 0.3 MG/1
0.3 TABLET ORAL 2 TIMES DAILY
Qty: 60 TABLET | Refills: 11 | OUTPATIENT
Start: 2024-01-18

## 2024-01-18 RX ORDER — RIZATRIPTAN BENZOATE 10 MG/1
10 TABLET ORAL DAILY PRN
Qty: 30 TABLET | Refills: 4 | OUTPATIENT
Start: 2024-01-18

## 2024-01-18 RX ORDER — DIAZEPAM 5 MG/1
7.5 TABLET ORAL EVERY 8 HOURS PRN
Qty: 120 TABLET | Refills: 2 | OUTPATIENT
Start: 2024-01-18 | End: 2024-04-17

## 2024-01-18 RX ORDER — NORTRIPTYLINE HYDROCHLORIDE 25 MG/1
25 CAPSULE ORAL NIGHTLY
Qty: 90 CAPSULE | Refills: 3 | OUTPATIENT
Start: 2024-01-18

## 2024-01-18 RX ORDER — OXYCODONE HYDROCHLORIDE 10 MG/1
10 TABLET ORAL EVERY 6 HOURS PRN
Qty: 120 TABLET | Refills: 0 | OUTPATIENT
Start: 2024-01-18 | End: 2024-02-17

## 2024-01-18 RX ORDER — NALOXONE HYDROCHLORIDE 4 MG/.1ML
1 SPRAY NASAL PRN
Qty: 2 EACH | Refills: 5 | OUTPATIENT
Start: 2024-01-18

## 2024-01-18 RX ORDER — METHOCARBAMOL 500 MG/1
500 TABLET, FILM COATED ORAL 4 TIMES DAILY
Qty: 120 TABLET | Refills: 5 | OUTPATIENT
Start: 2024-01-18

## 2024-01-18 RX ORDER — NAPROXEN 500 MG/1
500 TABLET ORAL 2 TIMES DAILY
Qty: 20 TABLET | Refills: 0 | OUTPATIENT
Start: 2024-01-18 | End: 2024-01-28

## 2024-01-18 RX ORDER — IBUPROFEN 600 MG/1
600 TABLET ORAL 3 TIMES DAILY PRN
Qty: 60 TABLET | Refills: 0 | OUTPATIENT
Start: 2024-01-18 | End: 2024-02-17

## 2024-01-18 RX ORDER — GABAPENTIN 600 MG/1
600 TABLET ORAL 3 TIMES DAILY
Qty: 90 TABLET | Refills: 11 | OUTPATIENT
Start: 2024-01-18 | End: 2025-01-12

## 2024-01-19 ENCOUNTER — TELEPHONE (OUTPATIENT)
Dept: INTERNAL MEDICINE CLINIC | Age: 59
End: 2024-01-19

## 2024-01-19 NOTE — TELEPHONE ENCOUNTER
----- Message from Lindsey Miller sent at 1/19/2024 12:59 PM EST -----  Subject: Message to Provider    QUESTIONS  Information for Provider? Patient called to see if she needs to schedule   an appointment when refilling her medications. She doesn't need any, but   wanted to know the process when the time comes.  ---------------------------------------------------------------------------  --------------  CALL BACK INFO  [phone]; OK to leave message on voicemail  ---------------------------------------------------------------------------  --------------  SCRIPT ANSWERS  Relationship to Patient? Self

## 2024-01-19 NOTE — TELEPHONE ENCOUNTER
No she can just call and let us know when she needs refill and as long as its time for refill we can send one

## 2024-01-24 ENCOUNTER — TELEPHONE (OUTPATIENT)
Dept: INTERNAL MEDICINE CLINIC | Age: 59
End: 2024-01-24

## 2024-01-24 NOTE — TELEPHONE ENCOUNTER
----- Message from Telma Walker sent at 1/24/2024  4:32 PM EST -----  Subject: Refill Request    QUESTIONS  Name of Medication? oxyCODONE-acetaminophen (ENDOCET)  MG per tablet  Patient-reported dosage and instructions? 4 times a day depending on pain   How many days do you have left? 3  [facility] Pharmacy? [facility] [id number]  Pharmacy phone number (if available)? [phone]  ---------------------------------------------------------------------------  --------------,  Name of Medication? Other - patient is not sure of spelling of medication  Patient-reported dosage and instructions? blood pressure medication 50MG  How many days do you have left? 0  Pref[facility] Pharmacy? [facility] [id number]  Pharmacy phone number (if available)? [phone]  Additional Information for Provider? Patient needs called she said she   gets a medication for blood pressure she did not know spelling of   medication and it was not in her listing of medications but she needs it   refilled   ---------------------------------------------------------------------------  --------------  CALL BACK INFO  What is the best way for the office to contact you? OK to leave message on   voicemail  Preferred Call Back Phone Number? [phone]  ---------------------------------------------------------------------------  --------------  SCRIPT ANSWERS  Relationship to Patient? Self

## 2024-01-29 DIAGNOSIS — G89.29 CHRONIC PAIN OF LEFT LOWER EXTREMITY: ICD-10-CM

## 2024-01-29 DIAGNOSIS — M19.019 ACROMIOCLAVICULAR JOINT ARTHRITIS, UNSPECIFIED LATERALITY: ICD-10-CM

## 2024-01-29 DIAGNOSIS — M79.605 CHRONIC PAIN OF LEFT LOWER EXTREMITY: ICD-10-CM

## 2024-01-29 DIAGNOSIS — Z98.890 S/P ARTHROSCOPY OF SHOULDER: ICD-10-CM

## 2024-01-29 DIAGNOSIS — M51.36 DEGENERATIVE DISC DISEASE, LUMBAR: ICD-10-CM

## 2024-01-29 RX ORDER — METOPROLOL SUCCINATE 100 MG/1
TABLET, EXTENDED RELEASE ORAL
Qty: 30 TABLET | Refills: 11 | Status: SHIPPED | OUTPATIENT
Start: 2024-01-29

## 2024-01-29 RX ORDER — OXYCODONE HYDROCHLORIDE 10 MG/1
10 TABLET ORAL EVERY 6 HOURS PRN
Qty: 120 TABLET | Refills: 0 | Status: SHIPPED | OUTPATIENT
Start: 2024-01-29 | End: 2024-02-28

## 2024-01-29 RX ORDER — METOPROLOL SUCCINATE 100 MG/1
TABLET, EXTENDED RELEASE ORAL
COMMUNITY
Start: 2023-11-20 | End: 2024-01-29 | Stop reason: SDUPTHER

## 2024-01-29 NOTE — TELEPHONE ENCOUNTER
----- Message from April Nagle sent at 1/29/2024  4:13 PM EST -----  Subject: Refill Request    QUESTIONS  Name of Medication? Other - metoprolol 100mg  Patient-reported dosage and instructions? once daily in the morning   How many days do you have left? 0  Preferred Pharmacy? [facility] [id number]  Pharmacy phone number (if available)? [phone]  ---------------------------------------------------------------------------  --------------,  Name of Medication? Other - metoprolol 50mg  Patient-reported dosage and instructions? once daily in the afternoon  How many days do you have left? 0  Preferred Pharmacy? [facility] [id number]  Pharmacy phone number (if available)? [phone]  ---------------------------------------------------------------------------  --------------,  Name of Medication? oxyCODONE-acetaminophen (ENDOCET)  MG per tablet  Patient-reported dosage and instructions? Take 1 tablet by mouth every 6   hours as needed for Pain for up to 30 days. Intended supply? 30 days Max   Daily Amount? 4 tablets  How many days do you have left? 0  Pref[facility] Pharmacy? [facility] [id number]  Pharmacy phone number (if available)? [phone]  ---------------------------------------------------------------------------  --------------  CALL BACK INFO  What is the best way for the office to contact you? OK to leave message on   voicemail  Preferred Call Back Phone Number? [phone]  ---------------------------------------------------------------------------  --------------  SCRIPT ANSWERS  Relationship to Patient? Self

## 2024-01-29 NOTE — TELEPHONE ENCOUNTER
Future Appointments   Date Time Provider Department Center   2/2/2024 11:00 AM Canelo Galo MD [facility] - D[facility]         Last appt 12/22/23

## 2024-02-01 ENCOUNTER — TELEPHONE (OUTPATIENT)
Dept: INTERNAL MEDICINE CLINIC | Age: 59
End: 2024-02-01

## 2024-02-01 DIAGNOSIS — G43.109 MIGRAINE WITH AURA AND WITHOUT STATUS MIGRAINOSUS, NOT INTRACTABLE: ICD-10-CM

## 2024-02-01 NOTE — TELEPHONE ENCOUNTER
Submitted PA for Rizatriptan Benzoate 10MG tablets  Via CM (Key: [id number]) STATUS: PENDING.    Follow up done daily; if no decision with in three days we will refax.  If another three days goes by with no decision will call the insurance for status.    Called to f/u on PA. Eleni is having technical difficulties right now. Spoke with rep that will fax over the form to complete.

## 2024-02-02 ENCOUNTER — OFFICE VISIT (OUTPATIENT)
Dept: INTERNAL MEDICINE CLINIC | Age: 59
End: 2024-02-02
Payer: MEDICARE

## 2024-02-02 VITALS
OXYGEN SATURATION: 90 % | TEMPERATURE: 99.7 F | DIASTOLIC BLOOD PRESSURE: 95 MMHG | SYSTOLIC BLOOD PRESSURE: 148 MMHG | HEART RATE: 89 BPM

## 2024-02-02 DIAGNOSIS — H10.021 OTHER MUCOPURULENT CONJUNCTIVITIS OF RIGHT EYE: ICD-10-CM

## 2024-02-02 DIAGNOSIS — M79.672 LEFT FOOT PAIN: Primary | ICD-10-CM

## 2024-02-02 PROBLEM — F33.9 MAJOR DEPRESSIVE DISORDER, RECURRENT, UNSPECIFIED (HCC): Status: RESOLVED | Noted: 2023-11-15 | Resolved: 2024-02-02

## 2024-02-02 PROBLEM — F33.1 MAJOR DEPRESSIVE DISORDER, RECURRENT, MODERATE (HCC): Status: RESOLVED | Noted: 2023-11-15 | Resolved: 2024-02-02

## 2024-02-02 PROBLEM — F33.0 MAJOR DEPRESSIVE DISORDER, RECURRENT, MILD (HCC): Status: RESOLVED | Noted: 2023-11-15 | Resolved: 2024-02-02

## 2024-02-02 PROCEDURE — 3017F COLORECTAL CA SCREEN DOC REV: CPT | Performed by: STUDENT IN AN ORGANIZED HEALTH CARE EDUCATION/TRAINING PROGRAM

## 2024-02-02 PROCEDURE — G8484 FLU IMMUNIZE NO ADMIN: HCPCS | Performed by: STUDENT IN AN ORGANIZED HEALTH CARE EDUCATION/TRAINING PROGRAM

## 2024-02-02 PROCEDURE — 3077F SYST BP >= 140 MM HG: CPT | Performed by: STUDENT IN AN ORGANIZED HEALTH CARE EDUCATION/TRAINING PROGRAM

## 2024-02-02 PROCEDURE — 3080F DIAST BP >= 90 MM HG: CPT | Performed by: STUDENT IN AN ORGANIZED HEALTH CARE EDUCATION/TRAINING PROGRAM

## 2024-02-02 PROCEDURE — 1036F TOBACCO NON-USER: CPT | Performed by: STUDENT IN AN ORGANIZED HEALTH CARE EDUCATION/TRAINING PROGRAM

## 2024-02-02 PROCEDURE — G8427 DOCREV CUR MEDS BY ELIG CLIN: HCPCS | Performed by: STUDENT IN AN ORGANIZED HEALTH CARE EDUCATION/TRAINING PROGRAM

## 2024-02-02 PROCEDURE — G8420 CALC BMI NORM PARAMETERS: HCPCS | Performed by: STUDENT IN AN ORGANIZED HEALTH CARE EDUCATION/TRAINING PROGRAM

## 2024-02-02 PROCEDURE — 99213 OFFICE O/P EST LOW 20 MIN: CPT | Performed by: STUDENT IN AN ORGANIZED HEALTH CARE EDUCATION/TRAINING PROGRAM

## 2024-02-02 RX ORDER — KETOROLAC TROMETHAMINE 30 MG/ML
30 INJECTION, SOLUTION INTRAMUSCULAR; INTRAVENOUS ONCE
Status: SHIPPED | OUTPATIENT
Start: 2024-02-02 | End: 2024-02-07

## 2024-02-02 RX ORDER — HYDROCHLOROTHIAZIDE 12.5 MG/1
1 CAPSULE, GELATIN COATED ORAL DAILY
COMMUNITY
Start: 2023-04-28

## 2024-02-02 RX ORDER — ERYTHROMYCIN 5 MG/G
OINTMENT OPHTHALMIC
Qty: 3.5 G | Refills: 0 | Status: SHIPPED | OUTPATIENT
Start: 2024-02-02 | End: 2024-02-12

## 2024-02-02 RX ORDER — ATORVASTATIN CALCIUM 20 MG/1
TABLET, FILM COATED ORAL
COMMUNITY
Start: 2023-11-02

## 2024-02-02 RX ORDER — LEVETIRACETAM 500 MG/1
500 TABLET ORAL EVERY 12 HOURS SCHEDULED
COMMUNITY
Start: 2023-08-11

## 2024-02-02 RX ORDER — ALBUTEROL SULFATE 90 UG/1
2 AEROSOL, METERED RESPIRATORY (INHALATION) 2 TIMES DAILY
COMMUNITY
Start: 2017-05-30

## 2024-02-02 RX ORDER — RIZATRIPTAN BENZOATE 10 MG/1
10 TABLET ORAL DAILY PRN
Qty: 12 TABLET | Refills: 5 | Status: SHIPPED | OUTPATIENT
Start: 2024-02-02

## 2024-02-02 ASSESSMENT — PATIENT HEALTH QUESTIONNAIRE - PHQ9
8. MOVING OR SPEAKING SO SLOWLY THAT OTHER PEOPLE COULD HAVE NOTICED. OR THE OPPOSITE, BEING SO FIGETY OR RESTLESS THAT YOU HAVE BEEN MOVING AROUND A LOT MORE THAN USUAL: 3
SUM OF ALL RESPONSES TO PHQ QUESTIONS 1-9: 24
SUM OF ALL RESPONSES TO PHQ9 QUESTIONS 1 & 2: 6
6. FEELING BAD ABOUT YOURSELF - OR THAT YOU ARE A FAILURE OR HAVE LET YOURSELF OR YOUR FAMILY DOWN: 3
4. FEELING TIRED OR HAVING LITTLE ENERGY: 3
9. THOUGHTS THAT YOU WOULD BE BETTER OFF DEAD, OR OF HURTING YOURSELF: 0
5. POOR APPETITE OR OVEREATING: 3
SUM OF ALL RESPONSES TO PHQ QUESTIONS 1-9: 24
SUM OF ALL RESPONSES TO PHQ QUESTIONS 1-9: 24
2. FEELING DOWN, DEPRESSED OR HOPELESS: 3
SUM OF ALL RESPONSES TO PHQ QUESTIONS 1-9: 24
1. LITTLE INTEREST OR PLEASURE IN DOING THINGS: 3
7. TROUBLE CONCENTRATING ON THINGS, SUCH AS READING THE NEWSPAPER OR WATCHING TELEVISION: 3
3. TROUBLE FALLING OR STAYING ASLEEP: 3

## 2024-02-02 ASSESSMENT — COLUMBIA-SUICIDE SEVERITY RATING SCALE - C-SSRS
1. WITHIN THE PAST MONTH, HAVE YOU WISHED YOU WERE DEAD OR WISHED YOU COULD GO TO SLEEP AND NOT WAKE UP?: NO
6. HAVE YOU EVER DONE ANYTHING, STARTED TO DO ANYTHING, OR PREPARED TO DO ANYTHING TO END YOUR LIFE?: NO
2. HAVE YOU ACTUALLY HAD ANY THOUGHTS OF KILLING YOURSELF?: NO

## 2024-02-02 NOTE — TELEPHONE ENCOUNTER
Fax received. Qty limit for this drug is 12 tablets per 30 days. The submitted qty exceeds the limit. Do you still want to do a PA or just order 12 tablets?     If this requires a response please respond to the pool ( P MHCX P[facility] MEDICATION PRE-AUTH).      Thank you please advise patient.

## 2024-02-22 ENCOUNTER — TELEPHONE (OUTPATIENT)
Dept: INTERNAL MEDICINE CLINIC | Age: 59
End: 2024-02-22

## 2024-02-22 PROBLEM — H10.9 CONJUNCTIVITIS: Status: ACTIVE | Noted: 2024-02-22

## 2024-02-22 NOTE — TELEPHONE ENCOUNTER
----- Message from Ma. Gema Violetta Jackson sent at 2/21/2024  4:32 PM EST -----  Regarding: ECC Referral Request  ECC Referral Request    Reason for referral request: Specialty Provider    Specialist/Lab/Test patient is requesting (if known): Surgeon    Specialist Phone Number (if applicable): NA    Additional Information Lesia calling on behalf of Patient Shana Martel forgot the surgeons name and phone number for her Aunt's left foot surgery.   --------------------------------------------------------------------------------------------------------------------------    Relationship to Patient: Covered entity     Call Back Information: OK to leave message on voicemail  Preferred Call Back Number: Phone [phone]

## 2024-02-23 DIAGNOSIS — Z98.890 S/P ARTHROSCOPY OF SHOULDER: ICD-10-CM

## 2024-02-23 DIAGNOSIS — G89.29 CHRONIC PAIN OF LEFT LOWER EXTREMITY: ICD-10-CM

## 2024-02-23 DIAGNOSIS — M19.019 ACROMIOCLAVICULAR JOINT ARTHRITIS, UNSPECIFIED LATERALITY: ICD-10-CM

## 2024-02-23 DIAGNOSIS — M79.605 CHRONIC PAIN OF LEFT LOWER EXTREMITY: ICD-10-CM

## 2024-02-23 DIAGNOSIS — M51.36 DEGENERATIVE DISC DISEASE, LUMBAR: ICD-10-CM

## 2024-02-23 NOTE — PROGRESS NOTES
[facility] Internal Medicine  Clinic Progress note:    Shana Martel is a 58 y.o. female with the past medical history as listed below presenting to the clinic for follow up on chronic condition and discussion of preventative health care.    Chief Complaint   Patient presents with    Eye Problem     Reports stye in the right eye.           Past Medical History:   Diagnosis Date    Anxiety     Arthritis     Asthma     Hypertension     Neuropathy        Past Surgical History:   Procedure Laterality Date     SECTION      SHOULDER ARTHROSCOPY Right 10/28/15    labral debridement open shanna & decompression    TONSILLECTOMY         Social History     Socioeconomic History    Marital status:      Spouse name: Not on file    Number of children: 4    Years of education: Not on file    Highest education level: Not on file   Occupational History    Occupation: Disabled   Tobacco Use    Smoking status: Never    Smokeless tobacco: Never   Substance and Sexual Activity    Alcohol use: No     Alcohol/week: 0.0 standard drinks of alcohol    Drug use: No    Sexual activity: Not on file   Other Topics Concern    Not on file   Social History Narrative    Not on file     Social Determinants of Health     Financial Resource Strain: Low Risk  (10/27/2023)    Overall Financial Resource Strain (CARDIA)     Difficulty of Paying Living Expenses: Not hard at all   Food Insecurity: Not on file (10/27/2023)   Transportation Needs: Unknown (10/27/2023)    PRAPARE - Transportation     Lack of Transportation (Medical): Not on file     Lack of Transportation (Non-Medical): No   Physical Activity: Not on file   Stress: Not on file   Social Connections: Not on file   Intimate Partner Violence: Not on file   Housing Stability: Unknown (10/27/2023)    Housing Stability Vital Sign     Unable to Pay for Housing in the Last Year: Not on file     Number of Places Lived in the Last Year: Not on file     Unstable Housing in the Last Year:

## 2024-02-23 NOTE — TELEPHONE ENCOUNTER
----- Message from Juan Carlos Jose sent at 2/23/2024 11:48 AM EST -----  Subject: Refill Request    QUESTIONS  Name of Medication? oxyCODONE HCl (OXY-IR) 10 MG immediate release tablet  Patient-reported dosage and instructions? 4 times daily as needed  How many days do you have left? 0  Preferred Pharmacy? [facility] [id number]  Pharmacy phone number (if available)? [phone]  ---------------------------------------------------------------------------  --------------,  Name of Medication?  MG tablet  Patient-reported dosage and instructions? 3 times daily  How many days do you have left? 0  Preferred Pharmacy? [facility] [id number]  Pharmacy phone number (if available)? [phone]  ---------------------------------------------------------------------------  --------------,  Name of Medication? levETIRAcetam (KEPPRA) 500 MG tablet  Patient-reported dosage and instructions? 1 tablet every 12 hours.  How many days do you have left? 0  Preferred Pharmacy? [facility] [id number]  Pharmacy phone number (if available)? [phone]  ---------------------------------------------------------------------------  --------------  CALL BACK INFO  What is the best way for the office to contact you? OK to leave message on   voicemail  Preferred Call Back Phone Number? [phone]  ---------------------------------------------------------------------------  --------------  SCRIPT ANSWERS  Relationship to Patient? Self

## 2024-02-26 RX ORDER — LEVETIRACETAM 500 MG/1
500 TABLET ORAL EVERY 12 HOURS SCHEDULED
Qty: 60 TABLET | Refills: 11 | Status: SHIPPED | OUTPATIENT
Start: 2024-02-26

## 2024-02-26 RX ORDER — IBUPROFEN 600 MG/1
TABLET ORAL
Qty: 60 TABLET | Refills: 0 | Status: SHIPPED | OUTPATIENT
Start: 2024-02-26

## 2024-02-26 RX ORDER — OXYCODONE HYDROCHLORIDE 10 MG/1
10 TABLET ORAL EVERY 6 HOURS PRN
Qty: 120 TABLET | Refills: 0 | Status: SHIPPED | OUTPATIENT
Start: 2024-02-26 | End: 2024-03-27

## 2024-03-09 DIAGNOSIS — F41.1 GAD (GENERALIZED ANXIETY DISORDER): ICD-10-CM

## 2024-03-13 RX ORDER — DIAZEPAM 10 MG/1
10 TABLET ORAL EVERY 8 HOURS PRN
Qty: 90 TABLET | Refills: 0 | Status: SHIPPED | OUTPATIENT
Start: 2024-03-13 | End: 2024-04-12

## 2024-03-18 ENCOUNTER — TELEPHONE (OUTPATIENT)
Dept: INTERNAL MEDICINE CLINIC | Age: 59
End: 2024-03-18

## 2024-03-18 DIAGNOSIS — F41.1 GAD (GENERALIZED ANXIETY DISORDER): Primary | ICD-10-CM

## 2024-03-18 NOTE — TELEPHONE ENCOUNTER
Pt was transferred through nurse triage line about mental health and suicidal thoughts, told pt we could get her scheduled with her PCP but he doesn't have anything until Friday. Pt refused to see another provider so I let pt know I could call squad and have them take her to the ER, pt refused and wants to speak with her PCP regarding her declining mental health.

## 2024-03-19 ENCOUNTER — OFFICE VISIT (OUTPATIENT)
Dept: INTERNAL MEDICINE CLINIC | Age: 59
End: 2024-03-19
Payer: MEDICARE

## 2024-03-19 VITALS
DIASTOLIC BLOOD PRESSURE: 92 MMHG | BODY MASS INDEX: 22.07 KG/M2 | HEIGHT: 69 IN | WEIGHT: 149 LBS | HEART RATE: 60 BPM | SYSTOLIC BLOOD PRESSURE: 152 MMHG | OXYGEN SATURATION: 97 %

## 2024-03-19 DIAGNOSIS — F33.1 MODERATE EPISODE OF RECURRENT MAJOR DEPRESSIVE DISORDER (HCC): Primary | ICD-10-CM

## 2024-03-19 PROCEDURE — 3077F SYST BP >= 140 MM HG: CPT | Performed by: STUDENT IN AN ORGANIZED HEALTH CARE EDUCATION/TRAINING PROGRAM

## 2024-03-19 PROCEDURE — G8484 FLU IMMUNIZE NO ADMIN: HCPCS | Performed by: STUDENT IN AN ORGANIZED HEALTH CARE EDUCATION/TRAINING PROGRAM

## 2024-03-19 PROCEDURE — G8427 DOCREV CUR MEDS BY ELIG CLIN: HCPCS | Performed by: STUDENT IN AN ORGANIZED HEALTH CARE EDUCATION/TRAINING PROGRAM

## 2024-03-19 PROCEDURE — 99213 OFFICE O/P EST LOW 20 MIN: CPT | Performed by: STUDENT IN AN ORGANIZED HEALTH CARE EDUCATION/TRAINING PROGRAM

## 2024-03-19 PROCEDURE — 3017F COLORECTAL CA SCREEN DOC REV: CPT | Performed by: STUDENT IN AN ORGANIZED HEALTH CARE EDUCATION/TRAINING PROGRAM

## 2024-03-19 PROCEDURE — G8420 CALC BMI NORM PARAMETERS: HCPCS | Performed by: STUDENT IN AN ORGANIZED HEALTH CARE EDUCATION/TRAINING PROGRAM

## 2024-03-19 PROCEDURE — 3080F DIAST BP >= 90 MM HG: CPT | Performed by: STUDENT IN AN ORGANIZED HEALTH CARE EDUCATION/TRAINING PROGRAM

## 2024-03-19 PROCEDURE — 1036F TOBACCO NON-USER: CPT | Performed by: STUDENT IN AN ORGANIZED HEALTH CARE EDUCATION/TRAINING PROGRAM

## 2024-03-20 ENCOUNTER — TELEPHONE (OUTPATIENT)
Dept: INTERNAL MEDICINE CLINIC | Age: 59
End: 2024-03-20

## 2024-03-20 NOTE — TELEPHONE ENCOUNTER
----- Message from Maria Jacobo sent at 3/20/2024 11:46 AM EDT -----  Subject: Refill Request    QUESTIONS  Name of Medication? oxyCODONE-acetaminophen (PERCOCET) 5-325 MG per tablet  Patient-reported dosage and instructions? Take 1-2 tablets by mouth every   8 hours as needed for Pain for up to 30 days. Max Daily Amount? 6 tablets  How many days do you have left? 0  Preferred Pharmacy? [facility] [id number]  Pharmacy phone number (if available)? [phone]  ---------------------------------------------------------------------------  --------------  CALL BACK INFO  What is the best way for the office to contact you? OK to leave message on   voicemail  Preferred Call Back Phone Number? [phone]  ---------------------------------------------------------------------------  --------------  SCRIPT ANSWERS  Relationship to Patient? Self

## 2024-03-22 ENCOUNTER — TELEPHONE (OUTPATIENT)
Dept: INTERNAL MEDICINE CLINIC | Age: 59
End: 2024-03-22

## 2024-03-22 NOTE — TELEPHONE ENCOUNTER
Pts  Dave Shah called, can the letter from 3/19/24 from Dr. Galo be faxed over to him on letter head?    Fax: [phone]  Ph: [phone] ext [phone]    Thank you

## 2024-03-25 ENCOUNTER — TELEPHONE (OUTPATIENT)
Dept: INTERNAL MEDICINE CLINIC | Age: 59
End: 2024-03-25

## 2024-03-25 DIAGNOSIS — M51.36 DEGENERATIVE DISC DISEASE, LUMBAR: ICD-10-CM

## 2024-03-25 DIAGNOSIS — M19.019 ACROMIOCLAVICULAR JOINT ARTHRITIS, UNSPECIFIED LATERALITY: ICD-10-CM

## 2024-03-25 DIAGNOSIS — G89.29 CHRONIC PAIN OF LEFT LOWER EXTREMITY: ICD-10-CM

## 2024-03-25 DIAGNOSIS — Z98.890 S/P ARTHROSCOPY OF SHOULDER: ICD-10-CM

## 2024-03-25 DIAGNOSIS — M79.605 CHRONIC PAIN OF LEFT LOWER EXTREMITY: ICD-10-CM

## 2024-03-25 RX ORDER — OXYCODONE HYDROCHLORIDE 10 MG/1
10 TABLET ORAL EVERY 6 HOURS PRN
Qty: 120 TABLET | Refills: 0 | Status: SHIPPED | OUTPATIENT
Start: 2024-03-25 | End: 2024-04-24

## 2024-03-25 NOTE — TELEPHONE ENCOUNTER
Please advise pt is scheduled 3/29/2024  But requesting a refill   oxyCODONE-acetaminophen (PERCOCET) 5-325 MG per tablet  last refill was 2/26/2024..disp 120

## 2024-03-25 NOTE — TELEPHONE ENCOUNTER
Pt called, she is requesting an ED follow but you do not have anything available until Thursday or Friday. Please advise    She also asking for a refill on her oxyCODONE-acetaminophen (PERCOCET) 5-325 MG per tablet     Please send on Maria A on 128    Thank you

## 2024-03-28 NOTE — PROGRESS NOTES
[facility] Internal Medicine  Clinic Progress note:    Shana Martel is a 58 y.o. female with the past medical history as listed below presenting to the clinic for follow up on chronic condition and discussion of preventative health care.    Chief Complaint   Patient presents with    Mental Health Problem    Depression           Past Medical History:   Diagnosis Date    Anxiety     Arthritis     Asthma     Hypertension     Neuropathy        Past Surgical History:   Procedure Laterality Date     SECTION      SHOULDER ARTHROSCOPY Right 10/28/15    labral debridement open shanna & decompression    TONSILLECTOMY         Social History     Socioeconomic History    Marital status:      Spouse name: Not on file    Number of children: 4    Years of education: Not on file    Highest education level: Not on file   Occupational History    Occupation: Disabled   Tobacco Use    Smoking status: Never    Smokeless tobacco: Never   Substance and Sexual Activity    Alcohol use: No     Alcohol/week: 0.0 standard drinks of alcohol    Drug use: No    Sexual activity: Not on file   Other Topics Concern    Not on file   Social History Narrative    Not on file     Social Determinants of Health     Financial Resource Strain: Low Risk  (10/27/2023)    Overall Financial Resource Strain (CARDIA)     Difficulty of Paying Living Expenses: Not hard at all   Food Insecurity: Not on file (10/27/2023)   Transportation Needs: Unknown (10/27/2023)    PRAPARE - Transportation     Lack of Transportation (Medical): Not on file     Lack of Transportation (Non-Medical): No   Physical Activity: Not on file   Stress: Not on file   Social Connections: Not on file   Intimate Partner Violence: Not on file   Housing Stability: Unknown (10/27/2023)    Housing Stability Vital Sign     Unable to Pay for Housing in the Last Year: Not on file     Number of Places Lived in the Last Year: Not on file     Unstable Housing in the Last Year: No

## 2024-03-29 ENCOUNTER — OFFICE VISIT (OUTPATIENT)
Dept: INTERNAL MEDICINE CLINIC | Age: 59
End: 2024-03-29
Payer: MEDICARE

## 2024-03-29 VITALS
SYSTOLIC BLOOD PRESSURE: 95 MMHG | HEART RATE: 57 BPM | WEIGHT: 148 LBS | TEMPERATURE: 98.1 F | OXYGEN SATURATION: 97 % | BODY MASS INDEX: 21.86 KG/M2 | DIASTOLIC BLOOD PRESSURE: 61 MMHG

## 2024-03-29 DIAGNOSIS — M79.672 LEFT FOOT PAIN: Primary | ICD-10-CM

## 2024-03-29 DIAGNOSIS — F33.1 MODERATE EPISODE OF RECURRENT MAJOR DEPRESSIVE DISORDER (HCC): ICD-10-CM

## 2024-03-29 PROCEDURE — 99213 OFFICE O/P EST LOW 20 MIN: CPT | Performed by: STUDENT IN AN ORGANIZED HEALTH CARE EDUCATION/TRAINING PROGRAM

## 2024-03-29 PROCEDURE — G8427 DOCREV CUR MEDS BY ELIG CLIN: HCPCS | Performed by: STUDENT IN AN ORGANIZED HEALTH CARE EDUCATION/TRAINING PROGRAM

## 2024-03-29 PROCEDURE — 3017F COLORECTAL CA SCREEN DOC REV: CPT | Performed by: STUDENT IN AN ORGANIZED HEALTH CARE EDUCATION/TRAINING PROGRAM

## 2024-03-29 PROCEDURE — G8484 FLU IMMUNIZE NO ADMIN: HCPCS | Performed by: STUDENT IN AN ORGANIZED HEALTH CARE EDUCATION/TRAINING PROGRAM

## 2024-03-29 PROCEDURE — 3078F DIAST BP <80 MM HG: CPT | Performed by: STUDENT IN AN ORGANIZED HEALTH CARE EDUCATION/TRAINING PROGRAM

## 2024-03-29 PROCEDURE — 1036F TOBACCO NON-USER: CPT | Performed by: STUDENT IN AN ORGANIZED HEALTH CARE EDUCATION/TRAINING PROGRAM

## 2024-03-29 PROCEDURE — 3074F SYST BP LT 130 MM HG: CPT | Performed by: STUDENT IN AN ORGANIZED HEALTH CARE EDUCATION/TRAINING PROGRAM

## 2024-03-29 PROCEDURE — G8420 CALC BMI NORM PARAMETERS: HCPCS | Performed by: STUDENT IN AN ORGANIZED HEALTH CARE EDUCATION/TRAINING PROGRAM

## 2024-03-29 RX ORDER — LEVETIRACETAM 500 MG/1
500 TABLET ORAL EVERY 12 HOURS SCHEDULED
Qty: 60 TABLET | Refills: 11 | Status: SHIPPED | OUTPATIENT
Start: 2024-03-29

## 2024-03-29 NOTE — PROGRESS NOTES
Mood normal.         Behavior: Behavior normal.          Assessment and Plan  Shana Martel presents to clinic for follow-up  Depression and suicidal ideations.  Patient is not suicidal today.  She was seen in the hospital last week for suicidal ideations.  She has a safety plan.  She is doing grief counseling.  She will not take antipsychotics or antidepressants  Chronic foot pain: Continue pain medicine.  Encouraged to see pain management doctor for potential injections.      See visit diagnosis and associated orders below.  Problem List    None        Canelo Galo MD      Discussed use, benefit, and side effects of prescribed medications.  Barriers to medication compliance addressed.  Discussed all ordered testing and labs. All patient questions answered. Patient agreeable with plan above.     Please note that this chart was generated using dragon dictation software.  Although every effort was made to ensure the accuracy of this automated transcription, some errors in transcription may have occurred.

## 2024-04-08 ENCOUNTER — TELEPHONE (OUTPATIENT)
Dept: INTERNAL MEDICINE CLINIC | Age: 59
End: 2024-04-08
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
## 2024-04-10 ENCOUNTER — HOSPITAL ENCOUNTER (EMERGENCY)
Age: 59
Discharge: HOME | End: 2024-04-10
Attending: EMERGENCY MEDICINE
Payer: MEDICARE

## 2024-04-10 ENCOUNTER — APPOINTMENT (OUTPATIENT)
Dept: GENERAL RADIOLOGY | Age: 59
End: 2024-04-10
Payer: MEDICARE

## 2024-04-10 ENCOUNTER — APPOINTMENT (OUTPATIENT)
Dept: CT IMAGING | Age: 59
End: 2024-04-10
Payer: MEDICARE

## 2024-04-10 ENCOUNTER — TELEPHONE (OUTPATIENT)
Dept: INTERNAL MEDICINE CLINIC | Age: 59
End: 2024-04-10

## 2024-04-10 VITALS
BODY MASS INDEX: 21.94 KG/M2 | WEIGHT: 148.59 LBS | SYSTOLIC BLOOD PRESSURE: 121 MMHG | HEART RATE: 53 BPM | TEMPERATURE: 97.5 F | OXYGEN SATURATION: 96 % | RESPIRATION RATE: 14 BRPM | DIASTOLIC BLOOD PRESSURE: 82 MMHG
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
## 2024-04-10 DIAGNOSIS — M25.572 PAIN IN LEFT ANKLE AND JOINTS OF LEFT FOOT: ICD-10-CM

## 2024-04-10 DIAGNOSIS — M54.50 MIDLINE LOW BACK PAIN WITHOUT SCIATICA, UNSPECIFIED CHRONICITY: ICD-10-CM

## 2024-04-10 DIAGNOSIS — M54.6 MIDLINE THORACIC BACK PAIN, UNSPECIFIED CHRONICITY: ICD-10-CM

## 2024-04-10 DIAGNOSIS — M54.2 NECK PAIN: ICD-10-CM

## 2024-04-10 DIAGNOSIS — F41.1 GAD (GENERALIZED ANXIETY DISORDER): Primary | ICD-10-CM

## 2024-04-10 DIAGNOSIS — R41.89 SEDATED DUE TO MULTIPLE MEDICATIONS: ICD-10-CM

## 2024-04-10 PROCEDURE — 6360000002 HC RX W HCPCS: Performed by: EMERGENCY MEDICINE

## 2024-04-10 PROCEDURE — 96372 THER/PROPH/DIAG INJ SC/IM: CPT

## 2024-04-10 PROCEDURE — 73630 X-RAY EXAM OF FOOT: CPT

## 2024-04-10 PROCEDURE — 72131 CT LUMBAR SPINE W/O DYE: CPT

## 2024-04-10 PROCEDURE — 6370000000 HC RX 637 (ALT 250 FOR IP): Performed by: EMERGENCY MEDICINE

## 2024-04-10 PROCEDURE — 72125 CT NECK SPINE W/O DYE: CPT

## 2024-04-10 PROCEDURE — 72128 CT CHEST SPINE W/O DYE: CPT

## 2024-04-10 PROCEDURE — 73610 X-RAY EXAM OF ANKLE: CPT

## 2024-04-10 PROCEDURE — 99284 EMERGENCY DEPT VISIT MOD MDM: CPT

## 2024-04-10 RX ORDER — KETOROLAC TROMETHAMINE 15 MG/ML
15 INJECTION, SOLUTION INTRAMUSCULAR; INTRAVENOUS ONCE
Status: COMPLETED | OUTPATIENT
Start: 2024-04-10 | End: 2024-04-10

## 2024-04-10 RX ORDER — DIAZEPAM 5 MG/1
5 TABLET ORAL ONCE
Status: COMPLETED | OUTPATIENT
Start: 2024-04-10 | End: 2024-04-10

## 2024-04-10 RX ORDER — ALPRAZOLAM 0.5 MG/1
0.5 TABLET ORAL 3 TIMES DAILY PRN
Qty: 90 TABLET | Refills: 0 | Status: SHIPPED | OUTPATIENT
Start: 2024-04-12 | End: 2024-04-10

## 2024-04-10 RX ADMIN — DIAZEPAM 5 MG: 5 TABLET ORAL at 17:40

## 2024-04-10 RX ADMIN — KETOROLAC TROMETHAMINE 15 MG: 15 INJECTION, SOLUTION INTRAMUSCULAR; INTRAVENOUS at 17:41

## 2024-04-10 ASSESSMENT — PAIN SCALES - GENERAL
PAINLEVEL_OUTOF10: 9
PAINLEVEL_OUTOF10: 6
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
## 2024-04-10 NOTE — TELEPHONE ENCOUNTER
Lesia called, Shana was requesting an Rx for Xanax, said she discussed this with you.    Please advise    Thank you

## 2024-04-10 NOTE — DISCHARGE INSTRUCTIONS
Your workup today included x-rays of your left ankle and foot as well as CT scans of your cervical, thoracic, and lumbar spine.  There are multiple chronic findings but no acute findings.    We discussed the case with your primary care, Dr. Galo.  He wants you to follow-up with him so please keep your appointment as scheduled on Friday.  I did discuss with you how worried I am about the multiple sedating medications you are on.  In addition to not being a great option for long-term pain they are likely to make your feelings of sadness due to the death of your  worse over time.      Return to ED for any new or worsening symptoms or concerns.

## 2024-04-12 ENCOUNTER — OFFICE VISIT (OUTPATIENT)
Dept: INTERNAL MEDICINE CLINIC | Age: 59
End: 2024-04-12
Payer: MEDICARE

## 2024-04-12 VITALS
WEIGHT: 141 LBS | OXYGEN SATURATION: 95 % | BODY MASS INDEX: 20.82 KG/M2 | HEART RATE: 62 BPM | TEMPERATURE: 97.8 F | SYSTOLIC BLOOD PRESSURE: 152 MMHG | DIASTOLIC BLOOD PRESSURE: 90 MMHG

## 2024-04-12 DIAGNOSIS — M51.36 DEGENERATIVE DISC DISEASE, LUMBAR: ICD-10-CM

## 2024-04-12 PROCEDURE — 3017F COLORECTAL CA SCREEN DOC REV: CPT | Performed by: STUDENT IN AN ORGANIZED HEALTH CARE EDUCATION/TRAINING PROGRAM

## 2024-04-12 PROCEDURE — 99213 OFFICE O/P EST LOW 20 MIN: CPT | Performed by: STUDENT IN AN ORGANIZED HEALTH CARE EDUCATION/TRAINING PROGRAM

## 2024-04-12 PROCEDURE — 1036F TOBACCO NON-USER: CPT | Performed by: STUDENT IN AN ORGANIZED HEALTH CARE EDUCATION/TRAINING PROGRAM

## 2024-04-12 PROCEDURE — G8427 DOCREV CUR MEDS BY ELIG CLIN: HCPCS | Performed by: STUDENT IN AN ORGANIZED HEALTH CARE EDUCATION/TRAINING PROGRAM

## 2024-04-12 PROCEDURE — 3077F SYST BP >= 140 MM HG: CPT | Performed by: STUDENT IN AN ORGANIZED HEALTH CARE EDUCATION/TRAINING PROGRAM

## 2024-04-12 PROCEDURE — G8420 CALC BMI NORM PARAMETERS: HCPCS | Performed by: STUDENT IN AN ORGANIZED HEALTH CARE EDUCATION/TRAINING PROGRAM

## 2024-04-12 PROCEDURE — 96372 THER/PROPH/DIAG INJ SC/IM: CPT | Performed by: STUDENT IN AN ORGANIZED HEALTH CARE EDUCATION/TRAINING PROGRAM

## 2024-04-12 PROCEDURE — 3080F DIAST BP >= 90 MM HG: CPT | Performed by: STUDENT IN AN ORGANIZED HEALTH CARE EDUCATION/TRAINING PROGRAM

## 2024-04-12 RX ORDER — KETOROLAC TROMETHAMINE 30 MG/ML
60 INJECTION, SOLUTION INTRAMUSCULAR; INTRAVENOUS ONCE
Status: COMPLETED | OUTPATIENT
Start: 2024-04-12 | End: 2024-04-12

## 2024-04-12 RX ORDER — BUSPIRONE HYDROCHLORIDE 5 MG/1
5 TABLET ORAL 2 TIMES DAILY
Qty: 60 TABLET | Refills: 3 | Status: SHIPPED | OUTPATIENT
Start: 2024-04-12 | End: 2024-08-10

## 2024-04-12 RX ORDER — GABAPENTIN 300 MG/1
300 CAPSULE ORAL 3 TIMES DAILY
Qty: 90 CAPSULE | Refills: 5 | Status: SHIPPED | OUTPATIENT
Start: 2024-04-12 | End: 2024-10-09

## 2024-04-12 RX ORDER — KETOROLAC TROMETHAMINE 30 MG/ML
30 INJECTION, SOLUTION INTRAMUSCULAR; INTRAVENOUS ONCE
Status: DISCONTINUED | OUTPATIENT
Start: 2024-04-12 | End: 2024-04-12

## 2024-04-12 RX ADMIN — KETOROLAC TROMETHAMINE 60 MG: 30 INJECTION, SOLUTION INTRAMUSCULAR; INTRAVENOUS at 14:57

## 2024-04-15 DIAGNOSIS — G89.29 CHRONIC PAIN OF LEFT LOWER EXTREMITY: ICD-10-CM

## 2024-04-15 DIAGNOSIS — M51.36 DEGENERATIVE DISC DISEASE, LUMBAR: ICD-10-CM

## 2024-04-15 DIAGNOSIS — M79.605 CHRONIC PAIN OF LEFT LOWER EXTREMITY: ICD-10-CM

## 2024-04-15 DIAGNOSIS — Z98.890 S/P ARTHROSCOPY OF SHOULDER: ICD-10-CM

## 2024-04-15 DIAGNOSIS — M19.019 ACROMIOCLAVICULAR JOINT ARTHRITIS, UNSPECIFIED LATERALITY: ICD-10-CM

## 2024-04-15 NOTE — TELEPHONE ENCOUNTER
Her  passed away and she cries all the time , medication is helping.        She need a refill on the oxycodone , she is taking 5 tablets a day.      Bridget

## 2024-04-18 NOTE — PROGRESS NOTES
Disp: 60 tablet, Rfl: 11    naloxone (NARCAN) 4 MG/0.1ML LIQD nasal spray, 1 spray by Nasal route as needed for Opioid Reversal, Disp: 2 each, Rfl: 5    methocarbamol (ROBAXIN) 500 MG tablet, Take 1 tablet by mouth 4 times daily, Disp: 120 tablet, Rfl: 5    DICLOFENAC PO, Take 75 mg by mouth 3 times daily, Disp: , Rfl:     Multiple Vitamins-Minerals (THERAPEUTIC MULTIVITAMIN-MINERALS) tablet, Take 1 tablet by mouth daily, Disp: , Rfl:     naproxen (NAPROSYN) 500 MG tablet, Take 1 tablet by mouth 2 times daily for 20 doses (Patient not taking: Reported on 2/2/2024), Disp: 20 tablet, Rfl: 0     Examination  Vitals:    04/12/24 1359 04/12/24 1403   BP: (!) 170/105 (!) 152/90   Site: Right Upper Arm Right Upper Arm   Position: Sitting Sitting   Cuff Size: Medium Adult Medium Adult   Pulse: 62    Temp: 97.8 °F (36.6 °C)    TempSrc: Oral    SpO2: 95%    Weight: 64 kg (141 lb)       Physical Exam  Constitutional:       General: She is not in acute distress.  HENT:      Mouth/Throat:      Mouth: Mucous membranes are moist.   Eyes:      General: No scleral icterus.     Pupils: Pupils are equal, round, and reactive to light.   Cardiovascular:      Rate and Rhythm: Normal rate and regular rhythm.      Pulses: Normal pulses.      Heart sounds: No murmur heard.     No gallop.   Pulmonary:      Effort: Pulmonary effort is normal. No respiratory distress.      Breath sounds: Normal breath sounds. No wheezing, rhonchi or rales.   Abdominal:      General: Abdomen is flat. Bowel sounds are normal. There is no distension.      Palpations: Abdomen is soft.      Tenderness: There is no abdominal tenderness.   Musculoskeletal:      Right lower leg: No edema.      Left lower leg: No edema.   Skin:     General: Skin is warm and dry.      Findings: No erythema or rash.   Neurological:      General: No focal deficit present.      Mental Status: She is alert and oriented to person, place, and time.   Psychiatric:         Mood and Affect: Mood

## 2024-04-22 RX ORDER — OXYCODONE HYDROCHLORIDE 10 MG/1
10 TABLET ORAL EVERY 8 HOURS PRN
Qty: 90 TABLET | Refills: 0 | Status: SHIPPED | OUTPATIENT
Start: 2024-04-22 | End: 2024-05-22

## 2024-04-26 DIAGNOSIS — F41.1 GAD (GENERALIZED ANXIETY DISORDER): ICD-10-CM

## 2024-04-29 RX ORDER — DIAZEPAM 10 MG/1
TABLET ORAL
Qty: 90 TABLET | Refills: 0 | Status: SHIPPED | OUTPATIENT
Start: 2024-04-29 | End: 2024-05-29

## 2024-05-07 ENCOUNTER — HOSPITAL ENCOUNTER (INPATIENT)
Age: 59
LOS: 3 days | Discharge: HOME | End: 2024-05-10
Attending: EMERGENCY MEDICINE | Admitting: STUDENT IN AN ORGANIZED HEALTH CARE EDUCATION/TRAINING PROGRAM
Payer: MEDICARE

## 2024-05-07 ENCOUNTER — APPOINTMENT (OUTPATIENT)
Dept: CT IMAGING | Age: 59
End: 2024-05-07
Payer: MEDICARE

## 2024-05-07 ENCOUNTER — APPOINTMENT (OUTPATIENT)
Dept: GENERAL RADIOLOGY | Age: 59
End: 2024-05-07
Payer: MEDICARE

## 2024-05-07 DIAGNOSIS — Z79.899 POLYPHARMACY: ICD-10-CM

## 2024-05-07 DIAGNOSIS — R53.1 LEFT-SIDED WEAKNESS: Primary | ICD-10-CM

## 2024-05-07 DIAGNOSIS — Z79.891 CHRONIC PRESCRIPTION OPIATE USE: ICD-10-CM

## 2024-05-07 DIAGNOSIS — R20.0 LEFT SIDED NUMBNESS: ICD-10-CM
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
## 2024-05-07 DIAGNOSIS — R47.81 SLURRED SPEECH: ICD-10-CM

## 2024-05-07 DIAGNOSIS — E87.8 ELECTROLYTE ABNORMALITY: ICD-10-CM

## 2024-05-07 LAB
ALBUMIN SERPL-MCNC: 3.5 G/DL (ref 3.4–5)
ALBUMIN/GLOB SERPL: 1.1 {RATIO} (ref 1.1–2.2)
ALP SERPL-CCNC: 68 U/L (ref 40–129)
ALT SERPL-CCNC: 16 U/L (ref 10–40)
ANION GAP SERPL CALCULATED.3IONS-SCNC: 11 MMOL/L (ref 3–16)
AST SERPL-CCNC: 25 U/L (ref 15–37)
BACTERIA URNS QL MICRO: NORMAL /HPF
BASOPHILS # BLD: 0.1 K/UL (ref 0–0.2)
BASOPHILS NFR BLD: 0.8 %
BILIRUB SERPL-MCNC: 0.6 MG/DL (ref 0–1)
BILIRUB UR QL STRIP.AUTO: NEGATIVE
BUN SERPL-MCNC: 7 MG/DL (ref 7–20)
CALCIUM SERPL-MCNC: 9.5 MG/DL (ref 8.3–10.6)
CHLORIDE SERPL-SCNC: 101 MMOL/L (ref 99–110)
CK SERPL-CCNC: 41 U/L (ref 26–192)
CLARITY UR: CLEAR
CO2 SERPL-SCNC: 25 MMOL/L (ref 21–32)
COLOR UR: YELLOW
CREAT SERPL-MCNC: 0.7 MG/DL (ref 0.6–1.1)
DEPRECATED RDW RBC AUTO: 13.4 % (ref 12.4–15.4)
EOSINOPHIL # BLD: 0.5 K/UL (ref 0–0.6)
EOSINOPHIL NFR BLD: 2.9 %
EPI CELLS #/AREA URNS AUTO: 0 /HPF (ref 0–5)
ETHANOLAMINE SERPL-MCNC: NORMAL MG/DL (ref 0–0.08)
GFR SERPLBLD CREATININE-BSD FMLA CKD-EPI: >90 ML/MIN/{1.73_M2}
GLUCOSE BLD-MCNC: 93 MG/DL (ref 70–99)
GLUCOSE SERPL-MCNC: 98 MG/DL (ref 70–99)
GLUCOSE UR STRIP.AUTO-MCNC: NEGATIVE MG/DL
HCT VFR BLD AUTO: 34.8 % (ref 36–48)
HGB BLD-MCNC: 12.3 G/DL (ref 12–16)
HGB UR QL STRIP.AUTO: NEGATIVE
HYALINE CASTS #/AREA URNS AUTO: 0 /LPF (ref 0–8)
INR PPP: 1.02 (ref 0.85–1.15)
KETONES UR STRIP.AUTO-MCNC: NEGATIVE MG/DL
LEUKOCYTE ESTERASE UR QL STRIP.AUTO: ABNORMAL
LYMPHOCYTES # BLD: 2.3 K/UL (ref 1–5.1)
LYMPHOCYTES NFR BLD: 13.2 %
MAGNESIUM SERPL-MCNC: 1.7 MG/DL (ref 1.8–2.4)
MCH RBC QN AUTO: 31 PG (ref 26–34)
MCHC RBC AUTO-ENTMCNC: 35.3 G/DL (ref 31–36)
MCV RBC AUTO: 87.9 FL (ref 80–100)
MONOCYTES # BLD: 1 K/UL (ref 0–1.3)
MONOCYTES NFR BLD: 5.8 %
NEUTROPHILS # BLD: 13.5 K/UL (ref 1.7–7.7)
NEUTROPHILS NFR BLD: 77.3 %
NITRITE UR QL STRIP.AUTO: NEGATIVE
PERFORMED ON: NORMAL
PH UR STRIP.AUTO: 7.5 [PH] (ref 5–8)
PHOSPHATE SERPL-MCNC: 2.3 MG/DL (ref 2.5–4.9)
PLATELET # BLD AUTO: 208 K/UL (ref 135–450)
PMV BLD AUTO: 8.3 FL (ref 5–10.5)
POTASSIUM SERPL-SCNC: 3.3 MMOL/L (ref 3.5–5.1)
PROT SERPL-MCNC: 6.7 G/DL (ref 6.4–8.2)
PROT UR STRIP.AUTO-MCNC: NEGATIVE MG/DL
PROTHROMBIN TIME: 13.6 SEC (ref 11.9–14.9)
RBC # BLD AUTO: 3.96 M/UL (ref 4–5.2)
RBC CLUMPS #/AREA URNS AUTO: 0 /HPF (ref 0–4)
SODIUM SERPL-SCNC: 137 MMOL/L (ref 136–145)
SP GR UR STRIP.AUTO: 1.03 (ref 1–1.03)
UA DIPSTICK W REFLEX MICRO PNL UR: YES
URN SPEC COLLECT METH UR: ABNORMAL
UROBILINOGEN UR STRIP-ACNC: 1 E.U./DL
WBC # BLD AUTO: 17.5 K/UL (ref 4–11)
WBC #/AREA URNS AUTO: 5 /HPF (ref 0–5)

## 2024-05-07 PROCEDURE — 87086 URINE CULTURE/COLONY COUNT: CPT

## 2024-05-07 PROCEDURE — 83735 ASSAY OF MAGNESIUM: CPT

## 2024-05-07 PROCEDURE — 70450 CT HEAD/BRAIN W/O DYE: CPT

## 2024-05-07 PROCEDURE — 6370000000 HC RX 637 (ALT 250 FOR IP): Performed by: EMERGENCY MEDICINE

## 2024-05-07 PROCEDURE — 99285 EMERGENCY DEPT VISIT HI MDM: CPT

## 2024-05-07 PROCEDURE — 82077 ASSAY SPEC XCP UR&BREATH IA: CPT

## 2024-05-07 PROCEDURE — 85025 COMPLETE CBC W/AUTO DIFF WBC: CPT

## 2024-05-07 PROCEDURE — 82550 ASSAY OF CK (CPK): CPT

## 2024-05-07 PROCEDURE — 1200000000 HC SEMI PRIVATE

## 2024-05-07 PROCEDURE — 70498 CT ANGIOGRAPHY NECK: CPT

## 2024-05-07 PROCEDURE — 6360000002 HC RX W HCPCS: Performed by: EMERGENCY MEDICINE

## 2024-05-07 PROCEDURE — 2700000000 HC OXYGEN THERAPY PER DAY

## 2024-05-07 PROCEDURE — 81001 URINALYSIS AUTO W/SCOPE: CPT

## 2024-05-07 PROCEDURE — 94761 N-INVAS EAR/PLS OXIMETRY MLT: CPT

## 2024-05-07 PROCEDURE — 84100 ASSAY OF PHOSPHORUS: CPT

## 2024-05-07 PROCEDURE — 96374 THER/PROPH/DIAG INJ IV PUSH: CPT

## 2024-05-07 PROCEDURE — 6370000000 HC RX 637 (ALT 250 FOR IP): Performed by: STUDENT IN AN ORGANIZED HEALTH CARE EDUCATION/TRAINING PROGRAM

## 2024-05-07 PROCEDURE — 2580000003 HC RX 258: Performed by: STUDENT IN AN ORGANIZED HEALTH CARE EDUCATION/TRAINING PROGRAM

## 2024-05-07 PROCEDURE — 93005 ELECTROCARDIOGRAM TRACING: CPT | Performed by: EMERGENCY MEDICINE

## 2024-05-07 PROCEDURE — 80053 COMPREHEN METABOLIC PANEL: CPT

## 2024-05-07 PROCEDURE — 71045 X-RAY EXAM CHEST 1 VIEW: CPT

## 2024-05-07 PROCEDURE — 85610 PROTHROMBIN TIME: CPT

## 2024-05-07 PROCEDURE — 72170 X-RAY EXAM OF PELVIS: CPT

## 2024-05-07 PROCEDURE — 6360000004 HC RX CONTRAST MEDICATION: Performed by: EMERGENCY MEDICINE

## 2024-05-07 RX ORDER — ALBUTEROL SULFATE 90 UG/1
2 AEROSOL, METERED RESPIRATORY (INHALATION)
Status: DISCONTINUED | OUTPATIENT
Start: 2024-05-07 | End: 2024-05-07

## 2024-05-07 RX ORDER — POTASSIUM CHLORIDE 7.45 MG/ML
10 INJECTION INTRAVENOUS PRN
Status: DISCONTINUED | OUTPATIENT
Start: 2024-05-07 | End: 2024-05-10 | Stop reason: HOSPADM

## 2024-05-07 RX ORDER — OXYCODONE HYDROCHLORIDE 10 MG/1
10 TABLET ORAL ONCE
Status: COMPLETED | OUTPATIENT
Start: 2024-05-07 | End: 2024-05-07

## 2024-05-07 RX ORDER — ASPIRIN 81 MG/1
81 TABLET, CHEWABLE ORAL ONCE
Status: COMPLETED | OUTPATIENT
Start: 2024-05-07 | End: 2024-05-07

## 2024-05-07 RX ORDER — M-VIT,TX,IRON,MINS/CALC/FOLIC 27MG-0.4MG
1 TABLET ORAL DAILY
Status: DISCONTINUED | OUTPATIENT
Start: 2024-05-07 | End: 2024-05-10 | Stop reason: HOSPADM

## 2024-05-07 RX ORDER — BUSPIRONE HYDROCHLORIDE 5 MG/1
5 TABLET ORAL 2 TIMES DAILY
Status: DISCONTINUED | OUTPATIENT
Start: 2024-05-07 | End: 2024-05-10 | Stop reason: HOSPADM

## 2024-05-07 RX ORDER — METHOCARBAMOL 500 MG/1
500 TABLET, FILM COATED ORAL 4 TIMES DAILY
Status: DISCONTINUED | OUTPATIENT
Start: 2024-05-07 | End: 2024-05-07

## 2024-05-07 RX ORDER — SUMATRIPTAN 50 MG/1
50 TABLET, FILM COATED ORAL ONCE
Status: DISCONTINUED | OUTPATIENT
Start: 2024-05-07 | End: 2024-05-07

## 2024-05-07 RX ORDER — MAGNESIUM SULFATE IN WATER 40 MG/ML
2000 INJECTION, SOLUTION INTRAVENOUS PRN
Status: DISCONTINUED | OUTPATIENT
Start: 2024-05-07 | End: 2024-05-10 | Stop reason: HOSPADM

## 2024-05-07 RX ORDER — GABAPENTIN 300 MG/1
300 CAPSULE ORAL 3 TIMES DAILY
Status: DISCONTINUED | OUTPATIENT
Start: 2024-05-07 | End: 2024-05-10 | Stop reason: HOSPADM

## 2024-05-07 RX ORDER — OXYCODONE HYDROCHLORIDE 10 MG/1
10 TABLET ORAL EVERY 8 HOURS PRN
Status: DISCONTINUED | OUTPATIENT
Start: 2024-05-07 | End: 2024-05-09

## 2024-05-07 RX ORDER — LANOLIN ALCOHOL/MO/W.PET/CERES
400 CREAM (GRAM) TOPICAL DAILY
Status: DISCONTINUED | OUTPATIENT
Start: 2024-05-07 | End: 2024-05-10 | Stop reason: HOSPADM

## 2024-05-07 RX ORDER — SUMATRIPTAN 50 MG/1
50 TABLET, FILM COATED ORAL DAILY PRN
Status: DISCONTINUED | OUTPATIENT
Start: 2024-05-07 | End: 2024-05-10 | Stop reason: HOSPADM

## 2024-05-07 RX ORDER — ONDANSETRON 2 MG/ML
4 INJECTION INTRAMUSCULAR; INTRAVENOUS EVERY 6 HOURS PRN
Status: DISCONTINUED | OUTPATIENT
Start: 2024-05-07 | End: 2024-05-10 | Stop reason: HOSPADM

## 2024-05-07 RX ORDER — ONDANSETRON 2 MG/ML
4 INJECTION INTRAMUSCULAR; INTRAVENOUS ONCE
Status: COMPLETED | OUTPATIENT
Start: 2024-05-07 | End: 2024-05-07

## 2024-05-07 RX ORDER — METHOCARBAMOL 500 MG/1
500 TABLET, FILM COATED ORAL 4 TIMES DAILY PRN
Status: DISCONTINUED | OUTPATIENT
Start: 2024-05-07 | End: 2024-05-09

## 2024-05-07 RX ORDER — NALOXONE HYDROCHLORIDE 4 MG/.1ML
1 SPRAY NASAL PRN
Status: DISCONTINUED | OUTPATIENT
Start: 2024-05-07 | End: 2024-05-07 | Stop reason: RX

## 2024-05-07 RX ORDER — ONDANSETRON 4 MG/1
4 TABLET, ORALLY DISINTEGRATING ORAL EVERY 8 HOURS PRN
Status: DISCONTINUED | OUTPATIENT
Start: 2024-05-07 | End: 2024-05-10 | Stop reason: HOSPADM

## 2024-05-07 RX ORDER — POTASSIUM CHLORIDE 20 MEQ/1
40 TABLET, EXTENDED RELEASE ORAL PRN
Status: DISCONTINUED | OUTPATIENT
Start: 2024-05-07 | End: 2024-05-10 | Stop reason: HOSPADM

## 2024-05-07 RX ORDER — CLONIDINE HYDROCHLORIDE 0.1 MG/1
0.3 TABLET ORAL 2 TIMES DAILY
Status: DISCONTINUED | OUTPATIENT
Start: 2024-05-07 | End: 2024-05-08

## 2024-05-07 RX ORDER — FERROUS SULFATE 325(65) MG
325 TABLET ORAL 2 TIMES DAILY WITH MEALS
COMMUNITY

## 2024-05-07 RX ORDER — ENOXAPARIN SODIUM 100 MG/ML
40 INJECTION SUBCUTANEOUS DAILY
Status: DISCONTINUED | OUTPATIENT
Start: 2024-05-08 | End: 2024-05-10 | Stop reason: HOSPADM

## 2024-05-07 RX ORDER — ATORVASTATIN CALCIUM 20 MG/1
20 TABLET, FILM COATED ORAL DAILY
Status: DISCONTINUED | OUTPATIENT
Start: 2024-05-07 | End: 2024-05-10 | Stop reason: HOSPADM

## 2024-05-07 RX ORDER — SODIUM CHLORIDE 0.9 % (FLUSH) 0.9 %
5-40 SYRINGE (ML) INJECTION EVERY 12 HOURS SCHEDULED
Status: DISCONTINUED | OUTPATIENT
Start: 2024-05-07 | End: 2024-05-10 | Stop reason: HOSPADM

## 2024-05-07 RX ORDER — SODIUM CHLORIDE 9 MG/ML
INJECTION, SOLUTION INTRAVENOUS PRN
Status: DISCONTINUED | OUTPATIENT
Start: 2024-05-07 | End: 2024-05-10 | Stop reason: HOSPADM

## 2024-05-07 RX ORDER — LEVETIRACETAM 500 MG/1
500 TABLET ORAL EVERY 12 HOURS SCHEDULED
Status: DISCONTINUED | OUTPATIENT
Start: 2024-05-07 | End: 2024-05-10 | Stop reason: HOSPADM

## 2024-05-07 RX ORDER — METOPROLOL SUCCINATE 50 MG/1
150 TABLET, EXTENDED RELEASE ORAL DAILY
Status: DISCONTINUED | OUTPATIENT
Start: 2024-05-07 | End: 2024-05-10 | Stop reason: HOSPADM

## 2024-05-07 RX ORDER — ACETAMINOPHEN 325 MG/1
650 TABLET ORAL EVERY 6 HOURS PRN
Status: DISCONTINUED | OUTPATIENT
Start: 2024-05-07 | End: 2024-05-10 | Stop reason: HOSPADM

## 2024-05-07 RX ORDER — POLYETHYLENE GLYCOL 3350 17 G/17G
17 POWDER, FOR SOLUTION ORAL DAILY PRN
Status: DISCONTINUED | OUTPATIENT
Start: 2024-05-07 | End: 2024-05-10 | Stop reason: HOSPADM

## 2024-05-07 RX ORDER — ACETAMINOPHEN 650 MG/1
650 SUPPOSITORY RECTAL EVERY 6 HOURS PRN
Status: DISCONTINUED | OUTPATIENT
Start: 2024-05-07 | End: 2024-05-10 | Stop reason: HOSPADM

## 2024-05-07 RX ORDER — FERROUS SULFATE 325(65) MG
325 TABLET ORAL 2 TIMES DAILY WITH MEALS
Status: DISCONTINUED | OUTPATIENT
Start: 2024-05-08 | End: 2024-05-10 | Stop reason: HOSPADM

## 2024-05-07 RX ORDER — ALBUTEROL SULFATE 90 UG/1
2 AEROSOL, METERED RESPIRATORY (INHALATION) EVERY 4 HOURS PRN
Status: DISCONTINUED | OUTPATIENT
Start: 2024-05-07 | End: 2024-05-10 | Stop reason: HOSPADM

## 2024-05-07 RX ORDER — SODIUM CHLORIDE 0.9 % (FLUSH) 0.9 %
5-40 SYRINGE (ML) INJECTION PRN
Status: DISCONTINUED | OUTPATIENT
Start: 2024-05-07 | End: 2024-05-10 | Stop reason: HOSPADM

## 2024-05-07 RX ADMIN — ATORVASTATIN CALCIUM 20 MG: 20 TABLET, FILM COATED ORAL at 21:15

## 2024-05-07 RX ADMIN — OXYCODONE HYDROCHLORIDE 10 MG: 10 TABLET ORAL at 16:06

## 2024-05-07 RX ADMIN — ASPIRIN 81 MG: 81 TABLET, CHEWABLE ORAL at 16:06

## 2024-05-07 RX ADMIN — OXYCODONE HYDROCHLORIDE 10 MG: 10 TABLET ORAL at 21:16

## 2024-05-07 RX ADMIN — Medication 1 TABLET: at 21:15

## 2024-05-07 RX ADMIN — CLONIDINE HYDROCHLORIDE 0.3 MG: 0.1 TABLET ORAL at 21:16

## 2024-05-07 RX ADMIN — Medication 10 ML: at 21:15

## 2024-05-07 RX ADMIN — POTASSIUM BICARBONATE 20 MEQ: 782 TABLET, EFFERVESCENT ORAL at 17:58

## 2024-05-07 RX ADMIN — GABAPENTIN 300 MG: 300 CAPSULE ORAL at 21:15

## 2024-05-07 RX ADMIN — IOPAMIDOL 75 ML: 755 INJECTION, SOLUTION INTRAVENOUS at 15:35

## 2024-05-07 RX ADMIN — LEVETIRACETAM 500 MG: 500 TABLET, FILM COATED ORAL at 21:15

## 2024-05-07 RX ADMIN — BUSPIRONE HYDROCHLORIDE 5 MG: 5 TABLET ORAL at 21:15

## 2024-05-07 RX ADMIN — DIBASIC SODIUM PHOSPHATE, MONOBASIC POTASSIUM PHOSPHATE AND MONOBASIC SODIUM PHOSPHATE 2 TABLET: 852; 155; 130 TABLET ORAL at 17:55

## 2024-05-07 RX ADMIN — ONDANSETRON 4 MG: 2 INJECTION INTRAMUSCULAR; INTRAVENOUS at 16:07

## 2024-05-07 RX ADMIN — Medication 400 MG: at 17:55

## 2024-05-07 ASSESSMENT — PAIN SCALES - GENERAL
PAINLEVEL_OUTOF10: 10

## 2024-05-07 ASSESSMENT — ENCOUNTER SYMPTOMS
VOMITING: 0
BACK PAIN: 1
ABDOMINAL PAIN: 0
SHORTNESS OF BREATH: 0
NAUSEA: 0

## 2024-05-07 ASSESSMENT — PAIN DESCRIPTION - DESCRIPTORS
DESCRIPTORS: ACHING
DESCRIPTORS: BURNING;DISCOMFORT;ACHING

## 2024-05-07 ASSESSMENT — PAIN DESCRIPTION - LOCATION
LOCATION: LEG;FOOT
LOCATION: GENERALIZED
LOCATION: FOOT
LOCATION: FOOT

## 2024-05-07 ASSESSMENT — PAIN DESCRIPTION - ONSET
ONSET: ON-GOING
ONSET: ON-GOING

## 2024-05-07 ASSESSMENT — PAIN SCALES - WONG BAKER: WONGBAKER_NUMERICALRESPONSE: HURTS A LITTLE BIT

## 2024-05-07 ASSESSMENT — PAIN - FUNCTIONAL ASSESSMENT
PAIN_FUNCTIONAL_ASSESSMENT: 0-10
PAIN_FUNCTIONAL_ASSESSMENT: ACTIVITIES ARE NOT PREVENTED
PAIN_FUNCTIONAL_ASSESSMENT: ACTIVITIES ARE NOT PREVENTED
PAIN_FUNCTIONAL_ASSESSMENT: PREVENTS OR INTERFERES WITH MANY ACTIVE NOT PASSIVE ACTIVITIES

## 2024-05-07 ASSESSMENT — LIFESTYLE VARIABLES
HOW OFTEN DO YOU HAVE A DRINK CONTAINING ALCOHOL: NEVER
HOW MANY STANDARD DRINKS CONTAINING ALCOHOL DO YOU HAVE ON A TYPICAL DAY: PATIENT DOES NOT DRINK

## 2024-05-07 ASSESSMENT — PAIN DESCRIPTION - ORIENTATION
ORIENTATION: LEFT

## 2024-05-07 ASSESSMENT — PAIN DESCRIPTION - PAIN TYPE
TYPE: ACUTE PAIN
TYPE: ACUTE PAIN

## 2024-05-07 ASSESSMENT — PAIN DESCRIPTION - FREQUENCY
FREQUENCY: CONTINUOUS
FREQUENCY: CONTINUOUS

## 2024-05-07 NOTE — FLOWSHEET NOTE
05/07/24 1702   Vital Signs   Pulse 93   Respirations 17   Oxygen Therapy   SpO2 (!) 88 %   O2 Device Nasal cannula   O2 Flow Rate (L/min) 2 L/min     Pts o2 dropping into the 80's while asleep. Pt placed on 2 liters of 02. Pts current o2 is now 93%.    made aware.

## 2024-05-07 NOTE — ED NOTES
This RN received handoff report; all questions answered at this time. Pt updated on admission status and pending transport to room. Orientated to call light and room.

## 2024-05-07 NOTE — ED TRIAGE NOTES
Altered Mental Status (Pt comes in via [place] [facility] with c/o altered mental status, left facial droop, and left sided weakness. Pt has history of stroke with similar deficits. )     Upon arrival no altered mental status noted. Pt states that left sided weakness and facial droop are from previous stroke. ED MD at bedside. PT states that she fell at home about 11am and could not get up. PT states that her left leg always gets underneath of her. C/O 10/10 pain to the left foot.    LNK was around 11 am.  Blood sugar  93

## 2024-05-07 NOTE — PROGRESS NOTES
Medication Reconciliation    List of medications patient is currently taking is complete.     Source of information: 1. Conversation with patient at bedside                                      2. EPIC records      Notes regarding home medications:   1. Patient did not take any of her home medications prior to arrival to the ER today.    Edison Rucker RPH, PharmD, BCPS  5/7/2024 4:39 PM

## 2024-05-08 ENCOUNTER — APPOINTMENT (OUTPATIENT)
Dept: MRI IMAGING | Age: 59
End: 2024-05-08
Payer: MEDICARE

## 2024-05-08 LAB
ANION GAP SERPL CALCULATED.3IONS-SCNC: 8 MMOL/L (ref 3–16)
BACTERIA UR CULT: NORMAL
BASOPHILS # BLD: 0.1 K/UL (ref 0–0.2)
BASOPHILS NFR BLD: 0.5 %
BUN SERPL-MCNC: 8 MG/DL (ref 7–20)
CALCIUM SERPL-MCNC: 8.7 MG/DL (ref 8.3–10.6)
CHLORIDE SERPL-SCNC: 104 MMOL/L (ref 99–110)
CO2 SERPL-SCNC: 30 MMOL/L (ref 21–32)
CREAT SERPL-MCNC: 0.7 MG/DL (ref 0.6–1.1)
DEPRECATED RDW RBC AUTO: 13.4 % (ref 12.4–15.4)
EKG ATRIAL RATE: 95 BPM
EKG DIAGNOSIS: NORMAL
EKG P AXIS: 52 DEGREES
EKG P-R INTERVAL: 196 MS
EKG Q-T INTERVAL: 376 MS
EKG QRS DURATION: 114 MS
EKG QTC CALCULATION (BAZETT): 472 MS
EKG R AXIS: -46 DEGREES
EKG T AXIS: 60 DEGREES
EKG VENTRICULAR RATE: 95 BPM
EOSINOPHIL # BLD: 0.7 K/UL (ref 0–0.6)
EOSINOPHIL NFR BLD: 6.9 %
GFR SERPLBLD CREATININE-BSD FMLA CKD-EPI: >90 ML/MIN/{1.73_M2}
GLUCOSE SERPL-MCNC: 94 MG/DL (ref 70–99)
HCT VFR BLD AUTO: 30.2 % (ref 36–48)
HGB BLD-MCNC: 10.9 G/DL (ref 12–16)
LYMPHOCYTES # BLD: 2.1 K/UL (ref 1–5.1)
LYMPHOCYTES NFR BLD: 22.6 %
MAGNESIUM SERPL-MCNC: 1.9 MG/DL (ref 1.8–2.4)
MCH RBC QN AUTO: 31.7 PG (ref 26–34)
MCHC RBC AUTO-ENTMCNC: 36.2 G/DL (ref 31–36)
MCV RBC AUTO: 87.6 FL (ref 80–100)
MONOCYTES # BLD: 0.7 K/UL (ref 0–1.3)
MONOCYTES NFR BLD: 7.1 %
NEUTROPHILS # BLD: 6 K/UL (ref 1.7–7.7)
NEUTROPHILS NFR BLD: 62.9 %
PLATELET # BLD AUTO: 194 K/UL (ref 135–450)
PMV BLD AUTO: 8.5 FL (ref 5–10.5)
POTASSIUM SERPL-SCNC: 3.1 MMOL/L (ref 3.5–5.1)
RBC # BLD AUTO: 3.45 M/UL (ref 4–5.2)
SODIUM SERPL-SCNC: 142 MMOL/L (ref 136–145)
WBC # BLD AUTO: 9.5 K/UL (ref 4–11)

## 2024-05-08 PROCEDURE — 6370000000 HC RX 637 (ALT 250 FOR IP): Performed by: EMERGENCY MEDICINE

## 2024-05-08 PROCEDURE — 97166 OT EVAL MOD COMPLEX 45 MIN: CPT

## 2024-05-08 PROCEDURE — 1200000000 HC SEMI PRIVATE

## 2024-05-08 PROCEDURE — 85025 COMPLETE CBC W/AUTO DIFF WBC: CPT

## 2024-05-08 PROCEDURE — 83735 ASSAY OF MAGNESIUM: CPT

## 2024-05-08 PROCEDURE — 70551 MRI BRAIN STEM W/O DYE: CPT

## 2024-05-08 PROCEDURE — 97161 PT EVAL LOW COMPLEX 20 MIN: CPT

## 2024-05-08 PROCEDURE — 93010 ELECTROCARDIOGRAM REPORT: CPT | Performed by: INTERNAL MEDICINE

## 2024-05-08 PROCEDURE — 97530 THERAPEUTIC ACTIVITIES: CPT

## 2024-05-08 PROCEDURE — 2580000003 HC RX 258: Performed by: STUDENT IN AN ORGANIZED HEALTH CARE EDUCATION/TRAINING PROGRAM

## 2024-05-08 PROCEDURE — 36415 COLL VENOUS BLD VENIPUNCTURE: CPT

## 2024-05-08 PROCEDURE — 94760 N-INVAS EAR/PLS OXIMETRY 1: CPT

## 2024-05-08 PROCEDURE — 97535 SELF CARE MNGMENT TRAINING: CPT

## 2024-05-08 PROCEDURE — 6370000000 HC RX 637 (ALT 250 FOR IP): Performed by: STUDENT IN AN ORGANIZED HEALTH CARE EDUCATION/TRAINING PROGRAM

## 2024-05-08 PROCEDURE — 80048 BASIC METABOLIC PNL TOTAL CA: CPT

## 2024-05-08 PROCEDURE — 6360000002 HC RX W HCPCS: Performed by: STUDENT IN AN ORGANIZED HEALTH CARE EDUCATION/TRAINING PROGRAM

## 2024-05-08 PROCEDURE — 99222 1ST HOSP IP/OBS MODERATE 55: CPT | Performed by: STUDENT IN AN ORGANIZED HEALTH CARE EDUCATION/TRAINING PROGRAM

## 2024-05-08 RX ORDER — LORAZEPAM 0.5 MG/1
0.5 TABLET ORAL ONCE
Status: COMPLETED | OUTPATIENT
Start: 2024-05-08 | End: 2024-05-08

## 2024-05-08 RX ORDER — CLONIDINE HYDROCHLORIDE 0.1 MG/1
0.3 TABLET ORAL NIGHTLY
Status: DISCONTINUED | OUTPATIENT
Start: 2024-05-08 | End: 2024-05-10 | Stop reason: HOSPADM

## 2024-05-08 RX ADMIN — OXYCODONE HYDROCHLORIDE 10 MG: 10 TABLET ORAL at 09:39

## 2024-05-08 RX ADMIN — ATORVASTATIN CALCIUM 20 MG: 20 TABLET, FILM COATED ORAL at 09:17

## 2024-05-08 RX ADMIN — POTASSIUM CHLORIDE 40 MEQ: 1500 TABLET, EXTENDED RELEASE ORAL at 09:17

## 2024-05-08 RX ADMIN — GABAPENTIN 300 MG: 300 CAPSULE ORAL at 20:24

## 2024-05-08 RX ADMIN — METHOCARBAMOL 500 MG: 500 TABLET ORAL at 03:31

## 2024-05-08 RX ADMIN — METHOCARBAMOL 500 MG: 500 TABLET ORAL at 13:44

## 2024-05-08 RX ADMIN — LORAZEPAM 0.5 MG: 0.5 TABLET ORAL at 10:59

## 2024-05-08 RX ADMIN — Medication 10 ML: at 09:18

## 2024-05-08 RX ADMIN — LEVETIRACETAM 500 MG: 500 TABLET, FILM COATED ORAL at 20:24

## 2024-05-08 RX ADMIN — Medication 1 TABLET: at 09:16

## 2024-05-08 RX ADMIN — BUSPIRONE HYDROCHLORIDE 5 MG: 5 TABLET ORAL at 20:24

## 2024-05-08 RX ADMIN — FERROUS SULFATE TAB 325 MG (65 MG ELEMENTAL FE) 325 MG: 325 (65 FE) TAB at 19:47

## 2024-05-08 RX ADMIN — OXYCODONE HYDROCHLORIDE 10 MG: 10 TABLET ORAL at 19:45

## 2024-05-08 RX ADMIN — BUSPIRONE HYDROCHLORIDE 5 MG: 5 TABLET ORAL at 09:17

## 2024-05-08 RX ADMIN — Medication 400 MG: at 09:17

## 2024-05-08 RX ADMIN — ENOXAPARIN SODIUM 40 MG: 100 INJECTION SUBCUTANEOUS at 09:17

## 2024-05-08 RX ADMIN — GABAPENTIN 300 MG: 300 CAPSULE ORAL at 13:58

## 2024-05-08 RX ADMIN — METHOCARBAMOL 500 MG: 500 TABLET ORAL at 21:13

## 2024-05-08 RX ADMIN — ACETAMINOPHEN 325MG 650 MG: 325 TABLET ORAL at 13:44

## 2024-05-08 RX ADMIN — FERROUS SULFATE TAB 325 MG (65 MG ELEMENTAL FE) 325 MG: 325 (65 FE) TAB at 09:17

## 2024-05-08 RX ADMIN — CLONIDINE HYDROCHLORIDE 0.3 MG: 0.1 TABLET ORAL at 20:24

## 2024-05-08 RX ADMIN — GABAPENTIN 300 MG: 300 CAPSULE ORAL at 09:16

## 2024-05-08 RX ADMIN — ACETAMINOPHEN 325MG 650 MG: 325 TABLET ORAL at 21:13

## 2024-05-08 RX ADMIN — LEVETIRACETAM 500 MG: 500 TABLET, FILM COATED ORAL at 09:17

## 2024-05-08 RX ADMIN — Medication 10 ML: at 20:24

## 2024-05-08 ASSESSMENT — PAIN SCALES - GENERAL
PAINLEVEL_OUTOF10: 6
PAINLEVEL_OUTOF10: 3

## 2024-05-08 ASSESSMENT — ENCOUNTER SYMPTOMS
BACK PAIN: 0
SORE THROAT: 0
COUGH: 0
CHEST TIGHTNESS: 0
ABDOMINAL PAIN: 0
NAUSEA: 0
DIARRHEA: 0
VOMITING: 0
CONSTIPATION: 0

## 2024-05-08 ASSESSMENT — PAIN DESCRIPTION - LOCATION
LOCATION: FOOT

## 2024-05-08 ASSESSMENT — PAIN - FUNCTIONAL ASSESSMENT
PAIN_FUNCTIONAL_ASSESSMENT: PREVENTS OR INTERFERES WITH MANY ACTIVE NOT PASSIVE ACTIVITIES
PAIN_FUNCTIONAL_ASSESSMENT: PREVENTS OR INTERFERES WITH MANY ACTIVE NOT PASSIVE ACTIVITIES

## 2024-05-08 ASSESSMENT — PAIN DESCRIPTION - ORIENTATION
ORIENTATION: LEFT

## 2024-05-08 ASSESSMENT — PAIN DESCRIPTION - DESCRIPTORS
DESCRIPTORS: ACHING;CRAMPING;DISCOMFORT
DESCRIPTORS: ACHING;POUNDING;SORE
DESCRIPTORS: ACHING;SORE;POUNDING

## 2024-05-08 NOTE — FLOWSHEET NOTE
Pt picked up to go to MRI. Pt tele and electrodes removed and ativan given. Pt ok to have her rings on per MRI tech; pt has 1 still on, others taken off. Pt has nose ring that was going to be the last thing to come out d/t it being new in hopes of preserving the hole, but I failed to take it and belly button ring out before transport left. Called down to MRI to inform that pt still needed those removed.      05/08/24 1102   Mobility   Location Change/Off Floor Radiology  (to mri; spoke to rei in [facility])   Transport Method Stretcher     Electronically signed by Dana Cast RN on 5/8/2024 at 11:08 AM
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
## 2024-05-08 NOTE — PLAN OF CARE
interventions unsuccessful or patient reports new pain  5/7/2024 2236 by Chiquis Damon RN  Outcome: Progressing  Flowsheets (Taken 5/7/2024 2236)  Verbalizes/displays adequate comfort level or baseline comfort level:   Encourage patient to monitor pain and request assistance   Assess pain using appropriate pain scale   Administer analgesics based on type and severity of pain and evaluate response   Implement non-pharmacological measures as appropriate and evaluate response   Consider cultural and social influences on pain and pain management   Notify Licensed Independent Practitioner if interventions unsuccessful or patient reports new pain     Problem: Skin/Tissue Integrity  Goal: Absence of new skin breakdown  Description: 1.  Monitor for areas of redness and/or skin breakdown  2.  Assess vascular access sites hourly  3.  Every 4-6 hours minimum:  Change oxygen saturation probe site  4.  Every 4-6 hours:  If on nasal continuous positive airway pressure, respiratory therapy assess nares and determine need for appliance change or resting period.  5/8/2024 1125 by Dana Cast RN  Outcome: Progressing  5/7/2024 2236 by Chiquis Damon RN  Outcome: Progressing     Problem: ABCDS Injury Assessment  Goal: Absence of physical injury  5/8/2024 1125 by Dana Cast RN  Outcome: Progressing  Flowsheets (Taken 5/8/2024 0900)  Absence of Physical Injury: Implement safety measures based on patient assessment  5/7/2024 2236 by Chiquis Damon RN  Outcome: Progressing  Flowsheets (Taken 5/7/2024 2236)  Absence of Physical Injury: Implement safety measures based on patient assessment     Problem: Neurosensory - Adult  Goal: Achieves stable or improved neurological status  Outcome: Progressing  Flowsheets (Taken 5/8/2024 0900)  Achieves stable or improved neurological status:   Assess for and report changes in neurological status   Initiate measures to prevent increased intracranial pressure   Monitor temperature, glucose, and  sodium. Initiate appropriate interventions as ordered  Goal: Achieves maximal functionality and self care  Outcome: Progressing  Flowsheets (Taken 5/8/2024 0900)  Achieves maximal functionality and self care: Monitor swallowing and airway patency with patient fatigue and changes in neurological status

## 2024-05-08 NOTE — PROGRESS NOTES
4 Eyes Skin Assessment     NAME:  Shana Martel  YOB: 1965  MEDICAL RECORD NUMBER:  [id number]    The patient is being assessed for  Admission    I agree that at least one RN has performed a thorough Head to Toe Skin Assessment on the patient. ALL assessment sites listed below have been assessed.      Areas assessed by both nurses:    Head, Face, Ears, Shoulders, Back, Chest, Arms, Elbows, Hands, Sacrum. Buttock, Coccyx, Ischium, and Legs. Feet and Heels        Does the Patient have a Wound? No noted wound(s)       Jimenez Prevention initiated by RN: Yes  Wound Care Orders initiated by RN: No    Pressure Injury (Stage 3,4, Unstageable, DTI, NWPT, and Complex wounds) if present, place Wound referral order by RN under : No    New Ostomies, if present place, Ostomy referral order under : No     Nurse 1 eSignature: Electronically signed by Chiquis Damon RN on 5/7/24 at 9:25 PM EDT    **SHARE this note so that the co-signing nurse can place an eSignature**    Nurse 2 eSignature: Electronically signed by Tosin Orellana RN on 5/8/24 at 2:42 AM EDT

## 2024-05-08 NOTE — CARE COORDINATION
Case Management Assessment  Initial Evaluation    Date/Time of Evaluation: 5/8/2024 9:32 AM  Assessment Completed by: Judie Marie RN    If patient is discharged prior to next notation, then this note serves as note for discharge by case management.    Patient Name: Shana Martel                   YOB: 1965  Diagnosis: Slurred speech [R47.81]  Weakness [R53.1]  Polypharmacy [Z79.899]  Left-sided weakness [R53.1]  Electrolyte abnormality [E87.8]  Left sided numbness [R20.0]  Fall from standing, initial encounter [W19.XXXA]  Chronic prescription opiate use [Z79.891]                   Date / Time: 5/7/2024  3:20 PM    Patient Admission Status: Inpatient   Readmission Risk (Low < 19, Mod (19-27), High > 27): Readmission Risk Score: 11.5    Current PCP: Canelo Galo MD  PCP verified by CM? Yes    Chart Reviewed: Yes      History Provided by: Patient  Patient Orientation: Alert and Oriented    Patient Cognition: Alert    Hospitalization in the last 30 days (Readmission):  No    If yes, Readmission Assessment in CM Navigator will be completed.    Advance Directives:      Code Status: Full Code   Patient's Primary Decision Maker is: Legal Next of Kin      Discharge Planning:    Patient lives with: Children, Family Members Type of Home: House (level entry)  Primary Care Giver: Self  Patient Support Systems include: Children, Family Members   Current Financial resources: Medicare  Current community resources: None  Current services prior to admission: None            Current DME:              Type of Home Care services:  None    ADLS  Prior functional level: Independent in ADLs/IADLs  Current functional level: Assistance with the following:, Bathing, Toileting, Mobility    PT AM-PAC:   /24  OT AM-PAC:   /24    Family can provide assistance at DC: Yes  Would you like Case Management to discuss the discharge plan with any other family members/significant others, and if so, who? No  Plans to Return to

## 2024-05-08 NOTE — ED PROVIDER NOTES
leadsQT has lengthened       XR CHEST PORTABLE   Final Result   No acute cardiopulmonary process.         XR PELVIS (1-2 VIEWS)   Final Result   No fracture or dislocation.         CTA HEAD NECK W CONTRAST   Final Result   No large vessel occlusion within the head or neck.         CT HEAD WO CONTRAST   Final Result   No acute intracranial abnormality within limitations of CT acquisition.      Moderate sized remote right MCA territory cortical infarct.  Additional right   FITO territory cortical infarct.      The findings were sent to the Radiology Results Communication Center at 3:58   pm on 5/7/2024 to be communicated to a licensed caregiver, received by Dr. Villasenor at 4:00 pm.         MRI BRAIN WO CONTRAST    (Results Pending)   _____________________________________________________________________    Critical Care    Performed by: Wes Villasenor MD  Authorized by: Wes Villasenor MD    Critical care provider statement:     Critical care time (minutes):  35    Critical care time was exclusive of:  Separately billable procedures and treating other patients    Critical care was necessary to treat or prevent imminent or life-threatening deterioration of the following conditions: Possible acute CVA.    Critical care was time spent personally by me on the following activities:  Blood draw for specimens, development of treatment plan with patient or surrogate, discussions with consultants, evaluation of patient's response to treatment, examination of patient, interpretation of cardiac output measurements, obtaining history from patient or surrogate, ordering and performing treatments and interventions, ordering and review of laboratory studies, ordering and review of radiographic studies, pulse oximetry, re-evaluation of patient's condition, review of old charts and vascular access procedures    I assumed direction of critical care for this patient from another provider in my specialty: no      Care discussed  with: admitting provider      _____________________________________________________________________    MEDICAL DECISION MAKING & PLANS:    I reviewed the patient's recent and/or pertinent records, current medications, triage notes, allergies, and vital signs.    Most recent VS:  BP: (!) 142/90,Temp: 98.4 °F (36.9 °C), Pulse: 85, Respirations: 16, SpO2: 99 %     Treatments:  Medications   magnesium oxide (MAG-OX) tablet 400 mg (400 mg Oral Given 5/7/24 1755)   oxyCODONE HCl (OXY-IR) immediate release tablet 10 mg (10 mg Oral Given 5/7/24 1606)   ondansetron (ZOFRAN) injection 4 mg (4 mg IntraVENous Given 5/7/24 1607)   aspirin chewable tablet 81 mg (81 mg Oral Given 5/7/24 1606)   phosphorus (K PHOS NEUTRAL) 2 tablet (2 tablets Oral Given 5/7/24 1755)   potassium bicarb-citric acid (EFFER-K) effervescent tablet 20 mEq (20 mEq Oral Given 5/7/24 1758)     Disposition:  Ms. Hurley is ill-appearing on evaluation the emergency department today and she does seem to have significant left-sided weakness.  As she reports that she is usually able to ambulate on her own I would suspect that her weakness, at least to her lower extremity, is worsened compared to her baseline.  We proceeded with urgent neuroimaging as she arrived to the emergency department.  CT head redemonstrates evidence of her prior CVA but does not show acute intracranial pathology.  CT angiography of her head and neck is negative for LVO.  Discussed case over the phone with stroke neurology staff at [facility].  The vascular neurology consultant is in agreement with our ED management thus far and agrees that Ms. Martel is not appropriate for treatment with thrombolytic medication given the timing of her symptom onset in relation to her arrival to the emergency department.  Gave her dose of aspirin.  Replaced deficient electrolytes as above.  Discussed exam findings, test results, potential etiologies of her presenting symptoms, and expected clinical course with her
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
## 2024-05-08 NOTE — PROGRESS NOTES
Physical Therapy  Facility/Department: [address] PROGRESSIVE CARE  Physical Therapy Initial Assessment    Name: Shana Martel  : 1965  MRN: [id number]  Date of Service: 2024    Discharge Recommendations:  Continue to assess pending progress, 24 hour supervision or assist, Home with Home health PT   PT Equipment Recommendations  Equipment Needed: No      Patient Diagnosis(es): The primary encounter diagnosis was Left-sided weakness. Diagnoses of Left sided numbness, Slurred speech, Fall from standing, initial encounter, Polypharmacy, Chronic prescription opiate use, and Electrolyte abnormality were also pertinent to this visit.  Past Medical History:  has a past medical history of Anxiety, Arthritis, Asthma, Hypertension, and Neuropathy.  Past Surgical History:  has a past surgical history that includes  section; Tonsillectomy; and Shoulder arthroscopy (Right, 10/28/15).    Assessment   Body Structures, Functions, Activity Limitations Requiring Skilled Therapeutic Intervention: Decreased functional mobility ;Decreased ROM;Decreased strength;Increased pain;Decreased balance  Assessment: Patient is 59 y/o female presenting to [facility] secondary to a fall. Patient currently presents below baseline function with all mobility. Patient's PLOF includes being independent without need for AD for transfers or ambulation. She reports multiple falls in the last year due to L foot pain. Patient has hx of L Lisfranc arthrodesis in . Today, patient requires CGA with transfers and ambulation of less than household distances with use of RW. Patient will benefit from skilled PT in order to return to PLOF with all functional mobility. Recommend patient has 24 hr sup upon d/c w/ HHPT. Will continue to follow.  Treatment Diagnosis: Weakness  Therapy Prognosis: Good  Decision Making: Low Complexity  Clinical Presentation: Stable  Requires PT Follow-Up: Yes  Activity Tolerance  Activity Tolerance: Patient tolerated

## 2024-05-08 NOTE — PROGRESS NOTES
Pt arrived to floor via stretcher from ED and transferred to bed. Telemetry activated. Patient oriented to room and use of call light. Call light and personal items within reach. Admission and assessment initiated. POC and education initiated and reviewed with patient. Denied further needs or questions at this time.

## 2024-05-08 NOTE — RT PROTOCOL NOTE
RT Inhaler-Nebulizer Bronchodilator Protocol Note    There is a bronchodilator order in the chart from a provider indicating to follow the RT Bronchodilator Protocol and there is an “Initiate RT Inhaler-Nebulizer Bronchodilator Protocol” order as well (see protocol at bottom of note).    CXR Findings:  XR CHEST PORTABLE    Result Date: 5/7/2024  No acute cardiopulmonary process.       The findings from the last RT Protocol Assessment were as follows:   History Pulmonary Disease: Smoker 15 pack years or more  Respiratory Pattern: Regular pattern and RR 12-20 bpm  Breath Sounds: Clear breath sounds  Cough: Strong, spontaneous, non-productive  Indication for Bronchodilator Therapy: Wheezing associated with pulm disorder  Bronchodilator Assessment Score: 1    Aerosolized bronchodilator medication orders have been revised according to the RT Inhaler-Nebulizer Bronchodilator Protocol below.    Respiratory Therapist to perform RT Therapy Protocol Assessment initially then follow the protocol.  Repeat RT Therapy Protocol Assessment PRN for score 0-3 or on second treatment, BID, and PRN for scores above 3.    No Indications - adjust the frequency to every 6 hours PRN wheezing or bronchospasm, if no treatments needed after 48 hours then discontinue using Per Protocol order mode.     If indication present, adjust the RT bronchodilator orders based on the Bronchodilator Assessment Score as indicated below.  Use Inhaler orders unless patient has one or more of the following: on home nebulizer, not able to hold breath for 10 seconds, is not alert and oriented, cannot activate and use MDI correctly, or respiratory rate 25 breaths per minute or more, then use the equivalent nebulizer order(s) with same Frequency and PRN reasons based on the score.  If a patient is on this medication at home then do not decrease Frequency below that used at home.    0-3 - enter or revise RT bronchodilator order(s) to equivalent RT Bronchodilator

## 2024-05-08 NOTE — PLAN OF CARE
Problem: Safety - Adult  Goal: Free from fall injury  Outcome: Progressing  Flowsheets (Taken 5/7/2024 2236)  Free From Fall Injury: Instruct family/caregiver on patient safety     Problem: Discharge Planning  Goal: Discharge to home or other facility with appropriate resources  Outcome: Progressing  Flowsheets (Taken 5/7/2024 2236)  Discharge to home or other facility with appropriate resources:   Identify barriers to discharge with patient and caregiver   Arrange for needed discharge resources and transportation as appropriate   Identify discharge learning needs (meds, wound care, etc)     Problem: Pain  Goal: Verbalizes/displays adequate comfort level or baseline comfort level  Outcome: Progressing  Flowsheets (Taken 5/7/2024 2236)  Verbalizes/displays adequate comfort level or baseline comfort level:   Encourage patient to monitor pain and request assistance   Assess pain using appropriate pain scale   Administer analgesics based on type and severity of pain and evaluate response   Implement non-pharmacological measures as appropriate and evaluate response   Consider cultural and social influences on pain and pain management   Notify Licensed Independent Practitioner if interventions unsuccessful or patient reports new pain     Problem: Skin/Tissue Integrity  Goal: Absence of new skin breakdown  Description: 1.  Monitor for areas of redness and/or skin breakdown  2.  Assess vascular access sites hourly  3.  Every 4-6 hours minimum:  Change oxygen saturation probe site  4.  Every 4-6 hours:  If on nasal continuous positive airway pressure, respiratory therapy assess nares and determine need for appliance change or resting period.  Outcome: Progressing     Problem: ABCDS Injury Assessment  Goal: Absence of physical injury  Outcome: Progressing  Flowsheets (Taken 5/7/2024 2236)  Absence of Physical Injury: Implement safety measures based on patient assessment

## 2024-05-08 NOTE — PROGRESS NOTES
Pt had metoprolol 150mg ordered for nightly meds tonight. Call placed to on-call provider if okay to give since patient is here for stroke-like symptoms. MD Shirlene Deshpande called back and said okay to hold.

## 2024-05-08 NOTE — PROGRESS NOTES
Pt K this am:  Lab Results   Component Value Date/Time    K 3.1 (L) 05/08/2024 05:07 AM      Will replace per K rplc protocol ordered with am meds.    Electronically signed by Dana Cast RN on 5/8/2024 at 7:37 AM

## 2024-05-09 LAB
ANION GAP SERPL CALCULATED.3IONS-SCNC: 7 MMOL/L (ref 3–16)
BASOPHILS # BLD: 0.1 K/UL (ref 0–0.2)
BASOPHILS NFR BLD: 1.2 %
BUN SERPL-MCNC: 13 MG/DL (ref 7–20)
CALCIUM SERPL-MCNC: 8.7 MG/DL (ref 8.3–10.6)
CHLORIDE SERPL-SCNC: 104 MMOL/L (ref 99–110)
CO2 SERPL-SCNC: 30 MMOL/L (ref 21–32)
CREAT SERPL-MCNC: 0.7 MG/DL (ref 0.6–1.1)
DEPRECATED RDW RBC AUTO: 13.2 % (ref 12.4–15.4)
EOSINOPHIL # BLD: 0.7 K/UL (ref 0–0.6)
EOSINOPHIL NFR BLD: 11.6 %
GFR SERPLBLD CREATININE-BSD FMLA CKD-EPI: >90 ML/MIN/{1.73_M2}
GLUCOSE SERPL-MCNC: 102 MG/DL (ref 70–99)
HCT VFR BLD AUTO: 34.9 % (ref 36–48)
HGB BLD-MCNC: 11.9 G/DL (ref 12–16)
LYMPHOCYTES # BLD: 2.1 K/UL (ref 1–5.1)
LYMPHOCYTES NFR BLD: 34.2 %
MCH RBC QN AUTO: 30.3 PG (ref 26–34)
MCHC RBC AUTO-ENTMCNC: 34.1 G/DL (ref 31–36)
MCV RBC AUTO: 88.6 FL (ref 80–100)
MONOCYTES # BLD: 0.5 K/UL (ref 0–1.3)
MONOCYTES NFR BLD: 8.5 %
NEUTROPHILS # BLD: 2.7 K/UL (ref 1.7–7.7)
NEUTROPHILS NFR BLD: 44.5 %
PLATELET # BLD AUTO: 234 K/UL (ref 135–450)
PMV BLD AUTO: 8.1 FL (ref 5–10.5)
POTASSIUM SERPL-SCNC: 4.3 MMOL/L (ref 3.5–5.1)
RBC # BLD AUTO: 3.94 M/UL (ref 4–5.2)
SODIUM SERPL-SCNC: 141 MMOL/L (ref 136–145)
WBC # BLD AUTO: 6.1 K/UL (ref 4–11)

## 2024-05-09 PROCEDURE — 97530 THERAPEUTIC ACTIVITIES: CPT

## 2024-05-09 PROCEDURE — 2580000003 HC RX 258: Performed by: STUDENT IN AN ORGANIZED HEALTH CARE EDUCATION/TRAINING PROGRAM

## 2024-05-09 PROCEDURE — 94760 N-INVAS EAR/PLS OXIMETRY 1: CPT

## 2024-05-09 PROCEDURE — 6370000000 HC RX 637 (ALT 250 FOR IP): Performed by: STUDENT IN AN ORGANIZED HEALTH CARE EDUCATION/TRAINING PROGRAM

## 2024-05-09 PROCEDURE — 97116 GAIT TRAINING THERAPY: CPT

## 2024-05-09 PROCEDURE — 80048 BASIC METABOLIC PNL TOTAL CA: CPT

## 2024-05-09 PROCEDURE — 36415 COLL VENOUS BLD VENIPUNCTURE: CPT

## 2024-05-09 PROCEDURE — 85025 COMPLETE CBC W/AUTO DIFF WBC: CPT

## 2024-05-09 PROCEDURE — 99232 SBSQ HOSP IP/OBS MODERATE 35: CPT | Performed by: STUDENT IN AN ORGANIZED HEALTH CARE EDUCATION/TRAINING PROGRAM

## 2024-05-09 PROCEDURE — 6370000000 HC RX 637 (ALT 250 FOR IP): Performed by: EMERGENCY MEDICINE

## 2024-05-09 PROCEDURE — 1200000000 HC SEMI PRIVATE

## 2024-05-09 RX ORDER — OXYCODONE HYDROCHLORIDE 10 MG/1
10 TABLET ORAL EVERY 6 HOURS PRN
Status: DISCONTINUED | OUTPATIENT
Start: 2024-05-09 | End: 2024-05-10 | Stop reason: HOSPADM

## 2024-05-09 RX ORDER — METHOCARBAMOL 500 MG/1
500 TABLET, FILM COATED ORAL 3 TIMES DAILY PRN
Status: DISCONTINUED | OUTPATIENT
Start: 2024-05-09 | End: 2024-05-10 | Stop reason: HOSPADM

## 2024-05-09 RX ORDER — OXYCODONE HYDROCHLORIDE 5 MG/1
7.5 TABLET ORAL EVERY 4 HOURS PRN
Status: DISCONTINUED | OUTPATIENT
Start: 2024-05-09 | End: 2024-05-09

## 2024-05-09 RX ADMIN — Medication 1 TABLET: at 08:23

## 2024-05-09 RX ADMIN — METHOCARBAMOL 500 MG: 500 TABLET ORAL at 22:24

## 2024-05-09 RX ADMIN — FERROUS SULFATE TAB 325 MG (65 MG ELEMENTAL FE) 325 MG: 325 (65 FE) TAB at 16:16

## 2024-05-09 RX ADMIN — OXYCODONE 7.5 MG: 5 TABLET ORAL at 02:05

## 2024-05-09 RX ADMIN — CLONIDINE HYDROCHLORIDE 0.3 MG: 0.1 TABLET ORAL at 20:11

## 2024-05-09 RX ADMIN — FERROUS SULFATE TAB 325 MG (65 MG ELEMENTAL FE) 325 MG: 325 (65 FE) TAB at 08:24

## 2024-05-09 RX ADMIN — LEVETIRACETAM 500 MG: 500 TABLET, FILM COATED ORAL at 08:24

## 2024-05-09 RX ADMIN — GABAPENTIN 300 MG: 300 CAPSULE ORAL at 20:11

## 2024-05-09 RX ADMIN — OXYCODONE HYDROCHLORIDE 10 MG: 10 TABLET ORAL at 16:15

## 2024-05-09 RX ADMIN — GABAPENTIN 300 MG: 300 CAPSULE ORAL at 14:30

## 2024-05-09 RX ADMIN — LEVETIRACETAM 500 MG: 500 TABLET, FILM COATED ORAL at 20:11

## 2024-05-09 RX ADMIN — GABAPENTIN 300 MG: 300 CAPSULE ORAL at 08:24

## 2024-05-09 RX ADMIN — OXYCODONE HYDROCHLORIDE 10 MG: 10 TABLET ORAL at 22:23

## 2024-05-09 RX ADMIN — METHOCARBAMOL 500 MG: 500 TABLET ORAL at 06:49

## 2024-05-09 RX ADMIN — Medication 10 ML: at 08:28

## 2024-05-09 RX ADMIN — BUSPIRONE HYDROCHLORIDE 5 MG: 5 TABLET ORAL at 20:11

## 2024-05-09 RX ADMIN — ATORVASTATIN CALCIUM 20 MG: 20 TABLET, FILM COATED ORAL at 08:24

## 2024-05-09 RX ADMIN — METOPROLOL SUCCINATE 150 MG: 50 TABLET, EXTENDED RELEASE ORAL at 08:23

## 2024-05-09 RX ADMIN — BUSPIRONE HYDROCHLORIDE 5 MG: 5 TABLET ORAL at 08:24

## 2024-05-09 RX ADMIN — OXYCODONE 7.5 MG: 5 TABLET ORAL at 12:09

## 2024-05-09 RX ADMIN — Medication 400 MG: at 08:23

## 2024-05-09 RX ADMIN — OXYCODONE 7.5 MG: 5 TABLET ORAL at 08:24

## 2024-05-09 RX ADMIN — Medication 10 ML: at 20:12

## 2024-05-09 ASSESSMENT — PAIN DESCRIPTION - DESCRIPTORS
DESCRIPTORS: TINGLING
DESCRIPTORS: ACHING;TINGLING;DISCOMFORT
DESCRIPTORS: ACHING
DESCRIPTORS: ACHING
DESCRIPTORS: BURNING;STABBING

## 2024-05-09 ASSESSMENT — PAIN DESCRIPTION - FREQUENCY
FREQUENCY: CONTINUOUS

## 2024-05-09 ASSESSMENT — PAIN DESCRIPTION - ORIENTATION
ORIENTATION: RIGHT;LEFT
ORIENTATION: LEFT;RIGHT
ORIENTATION: RIGHT;LEFT
ORIENTATION: RIGHT;LEFT
ORIENTATION: LEFT;RIGHT
ORIENTATION: RIGHT;LEFT
ORIENTATION: LEFT

## 2024-05-09 ASSESSMENT — PAIN - FUNCTIONAL ASSESSMENT
PAIN_FUNCTIONAL_ASSESSMENT: ACTIVITIES ARE NOT PREVENTED

## 2024-05-09 ASSESSMENT — PAIN DESCRIPTION - LOCATION
LOCATION: RIB CAGE;FOOT
LOCATION: FOOT
LOCATION: RIB CAGE;FOOT
LOCATION: FOOT;RIB CAGE
LOCATION: FOOT;RIB CAGE
LOCATION: RIB CAGE;FOOT
LOCATION: FOOT;RIB CAGE

## 2024-05-09 ASSESSMENT — PAIN DESCRIPTION - PAIN TYPE
TYPE: ACUTE PAIN

## 2024-05-09 ASSESSMENT — PAIN SCALES - GENERAL
PAINLEVEL_OUTOF10: 10
PAINLEVEL_OUTOF10: 8
PAINLEVEL_OUTOF10: 10
PAINLEVEL_OUTOF10: 8
PAINLEVEL_OUTOF10: 10
PAINLEVEL_OUTOF10: 10
PAINLEVEL_OUTOF10: 6
PAINLEVEL_OUTOF10: 10
PAINLEVEL_OUTOF10: 8
PAINLEVEL_OUTOF10: 9

## 2024-05-09 ASSESSMENT — PAIN SCALES - WONG BAKER
WONGBAKER_NUMERICALRESPONSE: NO HURT
WONGBAKER_NUMERICALRESPONSE: HURTS A LITTLE BIT

## 2024-05-09 ASSESSMENT — PAIN DESCRIPTION - ONSET
ONSET: ON-GOING

## 2024-05-09 NOTE — PLAN OF CARE
Problem: Safety - Adult  Goal: Free from fall injury  5/9/2024 0836 by Hanane Teague RN  Outcome: Progressing     Problem: Discharge Planning  Goal: Discharge to home or other facility with appropriate resources  5/9/2024 0836 by Hanane Teague RN  Outcome: Progressing     Problem: Pain  Goal: Verbalizes/displays adequate comfort level or baseline comfort level  5/9/2024 0836 by Hanane Teague RN  Outcome: Progressing     Problem: Skin/Tissue Integrity  Goal: Absence of new skin breakdown  Description: 1.  Monitor for areas of redness and/or skin breakdown  2.  Assess vascular access sites hourly  3.  Every 4-6 hours minimum:  Change oxygen saturation probe site  4.  Every 4-6 hours:  If on nasal continuous positive airway pressure, respiratory therapy assess nares and determine need for appliance change or resting period.  5/9/2024 0836 by Hanane Teague, RN  Outcome: Progressing     Problem: ABCDS Injury Assessment  Goal: Absence of physical injury  5/9/2024 0836 by Hanane Teague, RN  Outcome: Progressing     Problem: Neurosensory - Adult  Goal: Achieves stable or improved neurological status  5/9/2024 0836 by Hanane Teague, RN  Outcome: Progressing     Problem: Neurosensory - Adult  Goal: Achieves maximal functionality and self care  5/9/2024 0836 by Hanane Teague, RN  Outcome: Progressing

## 2024-05-09 NOTE — PROGRESS NOTES
[facility] Medicine Hospital Progress Note    Patient:  Shana Martel 58 y.o. female MRN: [id number]     Date of Service: 5/9/2024    Allergy: Aspirin, Penicillins, and Vicodin [hydrocodone-acetaminophen]  CC: F/u:   Brief Course   Shana Martel was admitted on 5/7/2024 for Weakness.    24 hr interval hx:  Patient is feeling ok today. She worked with therapy and is walking at baseline. She is having rib pain today     Objective     Physical Exam:  Vitals: BP (!) 132/91   Pulse 71   Temp 97.9 °F (36.6 °C) (Oral)   Resp 18   Ht 1.753 m (5' 9\")   Wt 69.5 kg (153 lb 3.5 oz)   LMP 11/15/2021   SpO2 97%   BMI 22.63 kg/m²     Physical Exam  Constitutional:       General: She is not in acute distress.  HENT:      Mouth/Throat:      Mouth: Mucous membranes are moist.   Eyes:      General: No scleral icterus.     Pupils: Pupils are equal, round, and reactive to light.   Cardiovascular:      Rate and Rhythm: Normal rate and regular rhythm.      Pulses: Normal pulses.      Heart sounds: No murmur heard.     No gallop.   Pulmonary:      Effort: Pulmonary effort is normal. No respiratory distress.      Breath sounds: Normal breath sounds. No wheezing, rhonchi or rales.   Abdominal:      General: Abdomen is flat. Bowel sounds are normal. There is no distension.      Palpations: Abdomen is soft.      Tenderness: There is no abdominal tenderness.   Musculoskeletal:      Right lower leg: No edema.      Left lower leg: No edema.   Skin:     General: Skin is warm and dry.      Findings: No erythema or rash.   Neurological:      General: No focal deficit present.      Mental Status: She is alert and oriented to person, place, and time.   Psychiatric:         Mood and Affect: Mood normal.         Behavior: Behavior normal.         Pertinent/ New Labs and Imaging Studies   Labs reviewed. Pertinent labs noted in assessment and plan      Assessment and Plan   Shana Martel was admitted to hospital for Weakness    #Weakness and fall:  Patient with fall and unable to stand back up. She reports mechanical fall related to her left foot. She notes some weakness, but also pain that caused her trouble with getting back up. CT and CTA showed old stroke. MRI with no acute stroke  - Contineu aspirin and statin       Chronic pain: Takes gabapentin and oxycodone PRN  - Resume home medications for pain control  - Hold if sedated     Hx of seizure disorder: On keppra at home. Did not lose consciousness when fell. No post ictal symptoms. Low suspicion for seizure. She has been compliant with her medications  - Continue Keppra 500 mg PO BID        PPx: Enoxaparin   Dispo: Discharge tomorrow AM if rib pain is better and no recurrence of weakness or facial droop        Canelo Galo MD  [facility]  Office [phone]  Cell [phone]

## 2024-05-09 NOTE — PLAN OF CARE
Problem: Safety - Adult  Goal: Free from fall injury  Outcome: Progressing     Problem: Discharge Planning  Goal: Discharge to home or other facility with appropriate resources  Outcome: Progressing     Problem: Pain  Goal: Verbalizes/displays adequate comfort level or baseline comfort level  Outcome: Progressing     Problem: Skin/Tissue Integrity  Goal: Absence of new skin breakdown  Description: 1.  Monitor for areas of redness and/or skin breakdown  2.  Assess vascular access sites hourly  3.  Every 4-6 hours minimum:  Change oxygen saturation probe site  4.  Every 4-6 hours:  If on nasal continuous positive airway pressure, respiratory therapy assess nares and determine need for appliance change or resting period.  Outcome: Progressing     Problem: ABCDS Injury Assessment  Goal: Absence of physical injury  Outcome: Progressing     Problem: Neurosensory - Adult  Goal: Achieves stable or improved neurological status  Outcome: Progressing

## 2024-05-09 NOTE — PROGRESS NOTES
Physical Therapy  Facility/Department: [address] PROGRESSIVE CARE  Physical Therapy Daily Treatment Session    Name: Shana Martel  : 1965  MRN: [id number]  Date of Service: 2024    Discharge Recommendations: Shana Martel scored a 18/24 on the AM-PAC short mobility form. Current research shows that an AM-PAC score of 18 or greater is typically associated with a discharge to the patient's home setting. Based on the patient's AM-PAC score and their current functional mobility deficits, it is recommended that the patient have 2-3 sessions per week of Physical Therapy at d/c to increase the patient's independence.  At this time, this patient demonstrates the endurance and safety to discharge home with HHPT and assist PRN and a follow up treatment frequency of 2-3x/wk.  Please see assessment section for further patient specific details.    If patient discharges prior to next session this note will serve as a discharge summary.  Please see below for the latest assessment towards goals.     HOME HEALTH CARE: LEVEL 1 STANDARD    - Initial home health evaluation to occur within 24-48 hours, in patient home   - Therapy to evaluate with goal of regaining prior level of functioning   - Therapy to evaluate if patient has Home Health Aide needs for personal care  24 hour supervision or assist, Home with Home health PT   PT Equipment Recommendations  Equipment Needed: No (Recommend pt use RW at home upon discharge)      Patient Diagnosis(es): The primary encounter diagnosis was Left-sided weakness. Diagnoses of Left sided numbness, Slurred speech, Fall from standing, initial encounter, Polypharmacy, Chronic prescription opiate use, and Electrolyte abnormality were also pertinent to this visit.  Past Medical History:  has a past medical history of Anxiety, Arthritis, Asthma, Hypertension, and Neuropathy.  Past Surgical History:  has a past surgical history that includes  section; Tonsillectomy; and Shoulder

## 2024-05-09 NOTE — PROGRESS NOTES
Patient resting in bed eating dinner. Patient gets q6hrs PRN pain medication. Patient complains of right rib pain and left foot pain. Patient educated on nonpharmacological interventions for pain.

## 2024-05-10 VITALS
HEIGHT: 69 IN | BODY MASS INDEX: 22.01 KG/M2 | OXYGEN SATURATION: 92 % | SYSTOLIC BLOOD PRESSURE: 128 MMHG | RESPIRATION RATE: 20 BRPM | TEMPERATURE: 97.6 F | DIASTOLIC BLOOD PRESSURE: 85 MMHG | WEIGHT: 148.59 LBS | HEART RATE: 71 BPM

## 2024-05-10 PROCEDURE — 94760 N-INVAS EAR/PLS OXIMETRY 1: CPT

## 2024-05-10 PROCEDURE — 6370000000 HC RX 637 (ALT 250 FOR IP): Performed by: EMERGENCY MEDICINE

## 2024-05-10 PROCEDURE — 97535 SELF CARE MNGMENT TRAINING: CPT

## 2024-05-10 PROCEDURE — 2580000003 HC RX 258: Performed by: STUDENT IN AN ORGANIZED HEALTH CARE EDUCATION/TRAINING PROGRAM

## 2024-05-10 PROCEDURE — 6370000000 HC RX 637 (ALT 250 FOR IP): Performed by: STUDENT IN AN ORGANIZED HEALTH CARE EDUCATION/TRAINING PROGRAM

## 2024-05-10 PROCEDURE — 97530 THERAPEUTIC ACTIVITIES: CPT

## 2024-05-10 RX ORDER — METHOCARBAMOL 500 MG/1
500 TABLET, FILM COATED ORAL 4 TIMES DAILY PRN
Qty: 1 TABLET | Refills: 0
Start: 2024-05-10

## 2024-05-10 RX ADMIN — ATORVASTATIN CALCIUM 20 MG: 20 TABLET, FILM COATED ORAL at 07:48

## 2024-05-10 RX ADMIN — GABAPENTIN 300 MG: 300 CAPSULE ORAL at 13:15

## 2024-05-10 RX ADMIN — GABAPENTIN 300 MG: 300 CAPSULE ORAL at 07:48

## 2024-05-10 RX ADMIN — Medication 10 ML: at 07:49

## 2024-05-10 RX ADMIN — OXYCODONE HYDROCHLORIDE 10 MG: 10 TABLET ORAL at 06:17

## 2024-05-10 RX ADMIN — Medication 1 TABLET: at 07:48

## 2024-05-10 RX ADMIN — METOPROLOL SUCCINATE 150 MG: 50 TABLET, EXTENDED RELEASE ORAL at 07:47

## 2024-05-10 RX ADMIN — Medication 400 MG: at 07:48

## 2024-05-10 RX ADMIN — OXYCODONE HYDROCHLORIDE 10 MG: 10 TABLET ORAL at 12:12

## 2024-05-10 RX ADMIN — LEVETIRACETAM 500 MG: 500 TABLET, FILM COATED ORAL at 07:48

## 2024-05-10 RX ADMIN — BUSPIRONE HYDROCHLORIDE 5 MG: 5 TABLET ORAL at 07:48

## 2024-05-10 RX ADMIN — FERROUS SULFATE TAB 325 MG (65 MG ELEMENTAL FE) 325 MG: 325 (65 FE) TAB at 07:47

## 2024-05-10 ASSESSMENT — PAIN DESCRIPTION - LOCATION
LOCATION: RIB CAGE;FOOT
LOCATION: FOOT;RIB CAGE

## 2024-05-10 ASSESSMENT — PAIN DESCRIPTION - ORIENTATION
ORIENTATION: LEFT;RIGHT
ORIENTATION: LEFT;RIGHT

## 2024-05-10 ASSESSMENT — PAIN - FUNCTIONAL ASSESSMENT: PAIN_FUNCTIONAL_ASSESSMENT: PREVENTS OR INTERFERES SOME ACTIVE ACTIVITIES AND ADLS

## 2024-05-10 ASSESSMENT — PAIN SCALES - GENERAL
PAINLEVEL_OUTOF10: 10
PAINLEVEL_OUTOF10: 10
PAINLEVEL_OUTOF10: 0
PAINLEVEL_OUTOF10: 7

## 2024-05-10 ASSESSMENT — PAIN SCALES - WONG BAKER
WONGBAKER_NUMERICALRESPONSE: NO HURT

## 2024-05-10 ASSESSMENT — PAIN DESCRIPTION - DESCRIPTORS
DESCRIPTORS: ACHING;BURNING
DESCRIPTORS: ACHING

## 2024-05-10 NOTE — PLAN OF CARE
Problem: Safety - Adult  Goal: Free from fall injury  Outcome: Progressing     Problem: Discharge Planning  Goal: Discharge to home or other facility with appropriate resources  Outcome: Progressing     Problem: Pain  Goal: Verbalizes/displays adequate comfort level or baseline comfort level  Outcome: Progressing     Problem: Skin/Tissue Integrity  Goal: Absence of new skin breakdown  Description: 1.  Monitor for areas of redness and/or skin breakdown  2.  Assess vascular access sites hourly  3.  Every 4-6 hours minimum:  Change oxygen saturation probe site  4.  Every 4-6 hours:  If on nasal continuous positive airway pressure, respiratory therapy assess nares and determine need for appliance change or resting period.  Outcome: Progressing     Problem: ABCDS Injury Assessment  Goal: Absence of physical injury  Outcome: Progressing     Problem: Neurosensory - Adult  Goal: Achieves stable or improved neurological status  Outcome: Progressing     Problem: Neurosensory - Adult  Goal: Achieves maximal functionality and self care  Outcome: Progressing

## 2024-05-10 NOTE — PLAN OF CARE
Problem: Safety - Adult  Goal: Free from fall injury  5/10/2024 0736 by Pina Logan RN  Outcome: Progressing     Problem: Discharge Planning  Goal: Discharge to home or other facility with appropriate resources  5/10/2024 0736 by Pina Logan RN  Outcome: Progressing     Problem: Pain  Goal: Verbalizes/displays adequate comfort level or baseline comfort level  5/10/2024 0736 by Pina Logan RN  Outcome: Progressing     Problem: Skin/Tissue Integrity  Goal: Absence of new skin breakdown  Description: 1.  Monitor for areas of redness and/or skin breakdown  2.  Assess vascular access sites hourly  3.  Every 4-6 hours minimum:  Change oxygen saturation probe site  4.  Every 4-6 hours:  If on nasal continuous positive airway pressure, respiratory therapy assess nares and determine need for appliance change or resting period.  5/10/2024 0736 by Pina Logan RN  Outcome: Progressing     Problem: ABCDS Injury Assessment  Goal: Absence of physical injury  5/10/2024 0736 by Pina Logan RN  Outcome: Progressing     Problem: Neurosensory - Adult  Goal: Achieves stable or improved neurological status  5/10/2024 0736 by Pina Logan RN  Outcome: Progressing

## 2024-05-10 NOTE — CARE COORDINATION
Case Management Discharge Note          Date / Time of Note: 5/10/2024 12:09 PM                  Patient Name: Shana Martel   YOB: 1965  Diagnosis: Slurred speech [R47.81]  Weakness [R53.1]  Polypharmacy [Z79.899]  Left-sided weakness [R53.1]  Electrolyte abnormality [E87.8]  Left sided numbness [R20.0]  Fall from standing, initial encounter [W19.XXXA]  Chronic prescription opiate use [Z79.891]   Date / Time: 5/7/2024  3:20 PM    Financial:  Payor: [employer] MEDICARE / Plan: [employer] GOLD PLUS HMO / Product Type: *No Product type* /      Pharmacy:    [id number] - [facility] [address] - P [phone] - F [phone]  [address] [facility] [address]  Phone: [phone] Fax: [phone]    [facility] [id number] [place], [place] [address]  - P [phone] - F [phone]  [address]   [facility] [address]  Phone: [phone] Fax: [phone]      Assistance purchasing medications?: Potential Assistance Purchasing Medications: No  Assistance provided by Case Management: None at this time    DISCHARGE Disposition: Home- No Services Needed    Home Care:  Home Care ordered at discharge: No. Patient also declines needing home care.    Transportation:  Transportation PLAN for discharge: family   Mode of Transport: Private Car  Time of Transport: after 12pm    [facility] Completed:   Yes, Case management has presented and reviewed IMM letter #2.       IMM Letter date given:: 05/10/24  IMM Letter time given:: 1200.   Patient and/or family/POA verbalized understanding of their medicare rights and appeal process if needed. Patient and/or family/POA has signed, initialed and placed the date and time on IMM letter #2 on the the appropriate lines. Copy of letter offered and they are aware that the original copy of IMM letter #2 is available prior to discharge from the paper chart on the unit.  Electronic documentation has been entered into epic for IMM letter #2 and original

## 2024-05-10 NOTE — PROGRESS NOTES
Discharge orders acknowledged by RN . Discharge teaching completed with pt and family. AVS reviewed and all questions answered. Medication regimen reviewed and pt understands schedule. Follow up appointments also reviewed with pt and resources given for discharge. Pt was sent electronic to be filled and understands schedule. IV removed. Bedside monitor removed from pt. 60+ minutes of education completed. Required core measures completed. Pt vitals WDL. Pt discharged with all belongings to home with niece. Pt transported off of unit via wheelchair. No complications.      Electronically signed by Pina Logan RN on 5/10/2024 at 1:38 PM
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
## 2024-05-13 ENCOUNTER — TELEPHONE (OUTPATIENT)
Dept: INTERNAL MEDICINE CLINIC | Age: 59
End: 2024-05-13

## 2024-05-13 NOTE — TELEPHONE ENCOUNTER
Care Transitions Initial Follow Up Call    Outreach made within 2 business days of discharge: Yes    Patient: Shana Martel Patient : 1965   MRN: [id number]  Reason for Admission: There are no discharge diagnoses documented for the most recent discharge.  Discharge Date: 5/10/24     [facility] for pt to return call  Follow Up  No future appointments.    Mile Ruby MA

## 2024-05-14 NOTE — PROGRESS NOTES
Physician Progress Note      PATIENT:               DYLON GARCIA  CSN #:                  [id number]  :                       1965  ADMIT DATE:       2024 3:20 PM  DISCH DATE:        5/10/2024 2:39 PM  RESPONDING  PROVIDER #:        Canelo Galo MD          QUERY TEXT:    Pt admitted with weakness. Pt noted to have prior stroke. I Per Nursing/PT/OT,   pt has left sided weakness.  If possible, please document in progress notes   and discharge summary the relationship, if any, between left sided weakness   and prior stroke.    The medical record reflects the following:  Risk Factors: 58 y.o. female with hx of prior stroke, HTN, chronic pain,   Asthma, Arthritis, Neuropathy  Clinical Indicators: Pt admitted for weakness, weaker than baseline  Treatment: PT/OT, Imaging  Options provided:  -- Left sided weakness likely due to sequela of prior stroke  -- Left sided weakness unrelated to prior stroke  -- Other - I will add my own diagnosis  -- Disagree - Not applicable / Not valid  -- Disagree - Clinically unable to determine / Unknown  -- Refer to Clinical Documentation Reviewer    PROVIDER RESPONSE TEXT:    This patient has left sided weakness likely due to sequela of prior stroke.    Query created by: Meet Davis on 5/10/2024 6:35 PM      Electronically signed by:  Canelo Galo MD 2024 11:33 AM

## 2024-05-20 ENCOUNTER — OFFICE VISIT (OUTPATIENT)
Dept: INTERNAL MEDICINE CLINIC | Age: 59
End: 2024-05-20
Payer: MEDICARE

## 2024-05-20 VITALS — BODY MASS INDEX: 20.67 KG/M2 | DIASTOLIC BLOOD PRESSURE: 90 MMHG | WEIGHT: 140 LBS | SYSTOLIC BLOOD PRESSURE: 144 MMHG

## 2024-05-20 DIAGNOSIS — F41.1 GAD (GENERALIZED ANXIETY DISORDER): ICD-10-CM

## 2024-05-20 DIAGNOSIS — M51.36 DEGENERATIVE DISC DISEASE, LUMBAR: ICD-10-CM

## 2024-05-20 DIAGNOSIS — M19.019 ACROMIOCLAVICULAR JOINT ARTHRITIS, UNSPECIFIED LATERALITY: ICD-10-CM

## 2024-05-20 DIAGNOSIS — Z09 HOSPITAL DISCHARGE FOLLOW-UP: Primary | ICD-10-CM

## 2024-05-20 DIAGNOSIS — Z98.890 S/P ARTHROSCOPY OF SHOULDER: ICD-10-CM

## 2024-05-20 DIAGNOSIS — M79.605 CHRONIC PAIN OF LEFT LOWER EXTREMITY: ICD-10-CM

## 2024-05-20 DIAGNOSIS — G89.29 CHRONIC PAIN OF LEFT LOWER EXTREMITY: ICD-10-CM

## 2024-05-20 PROCEDURE — G8427 DOCREV CUR MEDS BY ELIG CLIN: HCPCS | Performed by: STUDENT IN AN ORGANIZED HEALTH CARE EDUCATION/TRAINING PROGRAM

## 2024-05-20 PROCEDURE — 3077F SYST BP >= 140 MM HG: CPT | Performed by: STUDENT IN AN ORGANIZED HEALTH CARE EDUCATION/TRAINING PROGRAM

## 2024-05-20 PROCEDURE — 99214 OFFICE O/P EST MOD 30 MIN: CPT | Performed by: STUDENT IN AN ORGANIZED HEALTH CARE EDUCATION/TRAINING PROGRAM

## 2024-05-20 PROCEDURE — 1036F TOBACCO NON-USER: CPT | Performed by: STUDENT IN AN ORGANIZED HEALTH CARE EDUCATION/TRAINING PROGRAM

## 2024-05-20 PROCEDURE — 3017F COLORECTAL CA SCREEN DOC REV: CPT | Performed by: STUDENT IN AN ORGANIZED HEALTH CARE EDUCATION/TRAINING PROGRAM

## 2024-05-20 PROCEDURE — 3080F DIAST BP >= 90 MM HG: CPT | Performed by: STUDENT IN AN ORGANIZED HEALTH CARE EDUCATION/TRAINING PROGRAM

## 2024-05-20 PROCEDURE — G8420 CALC BMI NORM PARAMETERS: HCPCS | Performed by: STUDENT IN AN ORGANIZED HEALTH CARE EDUCATION/TRAINING PROGRAM

## 2024-05-20 PROCEDURE — 1111F DSCHRG MED/CURRENT MED MERGE: CPT | Performed by: STUDENT IN AN ORGANIZED HEALTH CARE EDUCATION/TRAINING PROGRAM

## 2024-05-20 RX ORDER — OXYCODONE HYDROCHLORIDE 10 MG/1
10 TABLET ORAL EVERY 8 HOURS PRN
Qty: 90 TABLET | Refills: 0 | Status: SHIPPED | OUTPATIENT
Start: 2024-05-20 | End: 2024-06-19

## 2024-05-20 RX ORDER — GABAPENTIN 300 MG/1
300 CAPSULE ORAL 3 TIMES DAILY
Qty: 90 CAPSULE | Refills: 5 | Status: SHIPPED | OUTPATIENT
Start: 2024-05-20 | End: 2024-11-16

## 2024-05-20 RX ORDER — CLONIDINE HYDROCHLORIDE 0.3 MG/1
0.3 TABLET ORAL 2 TIMES DAILY
Qty: 60 TABLET | Refills: 11 | Status: SHIPPED | OUTPATIENT
Start: 2024-05-20

## 2024-05-31 NOTE — PROGRESS NOTES
Post-Discharge Transitional Care  Follow Up      Shana Martel   YOB: 1965    Date of Office Visit:  5/20/2024  Date of Hospital Admission: 5/7/24  Date of Hospital Discharge: 5/10/24  Risk of hospital readmission (high >=14%. Medium >=10%) :Readmission Risk Score: 7.1      Care management risk score Rising risk (score 2-5) and Complex Care (Scores >=6): No Risk Score On File     Non face to face  following discharge, date last encounter closed (first attempt may have been earlier): *No documented post hospital discharge outreach found in the last 14 days    Call initiated 2 business days of discharge: *No response recorded in the last 14 days    ASSESSMENT/PLAN:   Hospital discharge follow-up  -     PA DISCHARGE MEDS RECONCILED W/ CURRENT OUTPATIENT MED LIST  Degenerative disc disease, lumbar  -     oxyCODONE HCl (OXY-IR) 10 MG immediate release tablet; Take 1 tablet by mouth every 8 hours as needed for Pain for up to 30 days. Intended supply: 30 days Max Daily Amount: 30 mg, Disp-90 tablet, R-0Normal  -     Magnesium  Acromioclavicular joint arthritis, unspecified laterality  -     oxyCODONE HCl (OXY-IR) 10 MG immediate release tablet; Take 1 tablet by mouth every 8 hours as needed for Pain for up to 30 days. Intended supply: 30 days Max Daily Amount: 30 mg, Disp-90 tablet, R-0Normal  -     Magnesium  S/P arthroscopy of shoulder  -     oxyCODONE HCl (OXY-IR) 10 MG immediate release tablet; Take 1 tablet by mouth every 8 hours as needed for Pain for up to 30 days. Intended supply: 30 days Max Daily Amount: 30 mg, Disp-90 tablet, R-0Normal  Chronic pain of left lower extremity  -     oxyCODONE HCl (OXY-IR) 10 MG immediate release tablet; Take 1 tablet by mouth every 8 hours as needed for Pain for up to 30 days. Intended supply: 30 days Max Daily Amount: 30 mg, Disp-90 tablet, R-0Normal  BEATRICE (generalized anxiety disorder)  -     cloNIDine (CATAPRES) 0.3 MG tablet; Take 1 tablet by mouth 2 times

## 2024-06-19 DIAGNOSIS — M19.019 ACROMIOCLAVICULAR JOINT ARTHRITIS, UNSPECIFIED LATERALITY: ICD-10-CM

## 2024-06-19 DIAGNOSIS — F41.1 GAD (GENERALIZED ANXIETY DISORDER): ICD-10-CM

## 2024-06-19 DIAGNOSIS — M51.36 DEGENERATIVE DISC DISEASE, LUMBAR: ICD-10-CM

## 2024-06-19 DIAGNOSIS — G89.29 CHRONIC PAIN OF LEFT LOWER EXTREMITY: ICD-10-CM

## 2024-06-19 DIAGNOSIS — M79.605 CHRONIC PAIN OF LEFT LOWER EXTREMITY: ICD-10-CM

## 2024-06-19 DIAGNOSIS — Z98.890 S/P ARTHROSCOPY OF SHOULDER: ICD-10-CM

## 2024-06-19 NOTE — TELEPHONE ENCOUNTER
Pt called, she is requesting a refill for her:    oxyCODONE HCl (OXY-IR) 10 MG immediate release tablet     cloNIDine (CATAPRES) 0.3 MG tablet     gabapentin (NEURONTIN) 300 MG capsule     Please send to [facility] on [address]      Also thinks she may have a kidney infection, cannot stop going to the bathroom    Please advise

## 2024-06-19 NOTE — TELEPHONE ENCOUNTER
Future Appointments   Date Time Provider Department Center   6/25/2024 12:45 PM Canelo Galo MD [facility] - [facility]       Last appt 5/20/24

## 2024-06-21 ENCOUNTER — NURSE ONLY (OUTPATIENT)
Dept: INTERNAL MEDICINE CLINIC | Age: 59
End: 2024-06-21

## 2024-06-21 DIAGNOSIS — M51.36 DEGENERATIVE DISC DISEASE, LUMBAR: Primary | ICD-10-CM

## 2024-06-21 RX ORDER — CLONIDINE HYDROCHLORIDE 0.3 MG/1
0.3 TABLET ORAL 2 TIMES DAILY
Qty: 60 TABLET | Refills: 11 | Status: SHIPPED | OUTPATIENT
Start: 2024-06-21

## 2024-06-21 RX ORDER — OXYCODONE HYDROCHLORIDE 10 MG/1
10 TABLET ORAL EVERY 8 HOURS PRN
Qty: 90 TABLET | Refills: 0 | Status: SHIPPED | OUTPATIENT
Start: 2024-06-21 | End: 2024-07-21

## 2024-06-21 RX ORDER — GABAPENTIN 300 MG/1
300 CAPSULE ORAL 3 TIMES DAILY
Qty: 90 CAPSULE | Refills: 5 | Status: SHIPPED | OUTPATIENT
Start: 2024-06-21 | End: 2024-12-18

## 2024-06-21 RX ORDER — TRIAMCINOLONE ACETONIDE 40 MG/ML
40 INJECTION, SUSPENSION INTRA-ARTICULAR; INTRAMUSCULAR ONCE
Status: COMPLETED | OUTPATIENT
Start: 2024-06-21 | End: 2024-06-21

## 2024-06-21 RX ADMIN — TRIAMCINOLONE ACETONIDE 40 MG: 40 INJECTION, SUSPENSION INTRA-ARTICULAR; INTRAMUSCULAR at 12:28

## 2024-06-25 ENCOUNTER — OFFICE VISIT (OUTPATIENT)
Dept: INTERNAL MEDICINE CLINIC | Age: 59
End: 2024-06-25
Payer: MEDICARE

## 2024-06-25 VITALS
WEIGHT: 138 LBS | OXYGEN SATURATION: 92 % | SYSTOLIC BLOOD PRESSURE: 116 MMHG | DIASTOLIC BLOOD PRESSURE: 74 MMHG | BODY MASS INDEX: 20.38 KG/M2 | HEART RATE: 49 BPM

## 2024-06-25 DIAGNOSIS — F41.1 GAD (GENERALIZED ANXIETY DISORDER): ICD-10-CM

## 2024-06-25 DIAGNOSIS — G43.109 MIGRAINE WITH AURA AND WITHOUT STATUS MIGRAINOSUS, NOT INTRACTABLE: ICD-10-CM

## 2024-06-25 PROCEDURE — G8427 DOCREV CUR MEDS BY ELIG CLIN: HCPCS | Performed by: STUDENT IN AN ORGANIZED HEALTH CARE EDUCATION/TRAINING PROGRAM

## 2024-06-25 PROCEDURE — G8420 CALC BMI NORM PARAMETERS: HCPCS | Performed by: STUDENT IN AN ORGANIZED HEALTH CARE EDUCATION/TRAINING PROGRAM

## 2024-06-25 PROCEDURE — 3078F DIAST BP <80 MM HG: CPT | Performed by: STUDENT IN AN ORGANIZED HEALTH CARE EDUCATION/TRAINING PROGRAM

## 2024-06-25 PROCEDURE — 99213 OFFICE O/P EST LOW 20 MIN: CPT | Performed by: STUDENT IN AN ORGANIZED HEALTH CARE EDUCATION/TRAINING PROGRAM

## 2024-06-25 PROCEDURE — 3074F SYST BP LT 130 MM HG: CPT | Performed by: STUDENT IN AN ORGANIZED HEALTH CARE EDUCATION/TRAINING PROGRAM

## 2024-06-25 PROCEDURE — 1036F TOBACCO NON-USER: CPT | Performed by: STUDENT IN AN ORGANIZED HEALTH CARE EDUCATION/TRAINING PROGRAM

## 2024-06-25 PROCEDURE — 3017F COLORECTAL CA SCREEN DOC REV: CPT | Performed by: STUDENT IN AN ORGANIZED HEALTH CARE EDUCATION/TRAINING PROGRAM

## 2024-06-25 RX ORDER — NORTRIPTYLINE HYDROCHLORIDE 25 MG/1
25 CAPSULE ORAL NIGHTLY
Qty: 90 CAPSULE | Refills: 3 | Status: SHIPPED | OUTPATIENT
Start: 2024-06-25

## 2024-06-25 RX ORDER — DIAZEPAM 10 MG/1
10 TABLET ORAL EVERY 8 HOURS PRN
Qty: 90 TABLET | Refills: 0 | Status: SHIPPED | OUTPATIENT
Start: 2024-06-25 | End: 2024-07-25

## 2024-06-25 NOTE — PROGRESS NOTES
[facility] Internal Medicine  Clinic Progress note:    Shana Martel is a 58 y.o. female with the past medical history as listed below presenting to the clinic for follow up on chronic condition and discussion of preventative health care.    Chief Complaint   Patient presents with    Back Pain           Past Medical History:   Diagnosis Date    Anxiety     Arthritis     Asthma     Hypertension     Neuropathy        Past Surgical History:   Procedure Laterality Date     SECTION      SHOULDER ARTHROSCOPY Right 10/28/15    labral debridement open shanna & decompression    TONSILLECTOMY         Social History     Socioeconomic History    Marital status:      Spouse name: Not on file    Number of children: 4    Years of education: Not on file    Highest education level: Not on file   Occupational History    Occupation: Disabled   Tobacco Use    Smoking status: Never    Smokeless tobacco: Never   Vaping Use    Vaping Use: Never used   Substance and Sexual Activity    Alcohol use: No     Alcohol/week: 0.0 standard drinks of alcohol    Drug use: No    Sexual activity: Not on file   Other Topics Concern    Not on file   Social History Narrative    Not on file     Social Determinants of Health     Financial Resource Strain: Low Risk  (10/27/2023)    Overall Financial Resource Strain (CARDIA)     Difficulty of Paying Living Expenses: Not hard at all   Food Insecurity: No Food Insecurity (2024)    Hunger Vital Sign     Worried About Running Out of Food in the Last Year: Never true     Ran Out of Food in the Last Year: Never true   Transportation Needs: No Transportation Needs (2024)    PRAPARE - Transportation     Lack of Transportation (Medical): No     Lack of Transportation (Non-Medical): No   Physical Activity: Not on file   Stress: Not on file   Social Connections: Not on file   Intimate Partner Violence: Not on file   Housing Stability: Low Risk  (2024)    Housing Stability Vital Sign

## 2024-07-08 ENCOUNTER — TELEPHONE (OUTPATIENT)
Dept: INTERNAL MEDICINE CLINIC | Age: 59
End: 2024-07-08

## 2024-07-08 NOTE — TELEPHONE ENCOUNTER
She would like to know if she could get another pain shot. ,please call her back and let her know....  [phone]

## 2024-07-09 ENCOUNTER — NURSE ONLY (OUTPATIENT)
Dept: INTERNAL MEDICINE CLINIC | Age: 59
End: 2024-07-09

## 2024-07-09 DIAGNOSIS — G43.109 MIGRAINE WITH AURA AND WITHOUT STATUS MIGRAINOSUS, NOT INTRACTABLE: ICD-10-CM

## 2024-07-09 DIAGNOSIS — M19.011 ARTHRITIS OF BOTH ACROMIOCLAVICULAR JOINTS: Primary | ICD-10-CM

## 2024-07-09 DIAGNOSIS — M19.012 ARTHRITIS OF BOTH ACROMIOCLAVICULAR JOINTS: Primary | ICD-10-CM

## 2024-07-09 RX ORDER — NORTRIPTYLINE HYDROCHLORIDE 25 MG/1
25 CAPSULE ORAL NIGHTLY
Qty: 90 CAPSULE | Refills: 3 | Status: SHIPPED | OUTPATIENT
Start: 2024-07-09

## 2024-07-09 RX ORDER — METOPROLOL SUCCINATE 50 MG/1
50 TABLET, EXTENDED RELEASE ORAL DAILY
Qty: 90 TABLET | Refills: 1 | Status: SHIPPED | OUTPATIENT
Start: 2024-07-09

## 2024-07-09 RX ORDER — RIZATRIPTAN BENZOATE 10 MG/1
10 TABLET ORAL DAILY PRN
Qty: 12 TABLET | Refills: 5 | Status: SHIPPED | OUTPATIENT
Start: 2024-07-09

## 2024-07-09 RX ADMIN — TRIAMCINOLONE ACETONIDE 40 MG: 40 INJECTION, SUSPENSION INTRA-ARTICULAR; INTRAMUSCULAR at 10:50

## 2024-07-16 DIAGNOSIS — Z98.890 S/P ARTHROSCOPY OF SHOULDER: ICD-10-CM

## 2024-07-16 DIAGNOSIS — M79.605 CHRONIC PAIN OF LEFT LOWER EXTREMITY: ICD-10-CM

## 2024-07-16 DIAGNOSIS — G89.29 CHRONIC PAIN OF LEFT LOWER EXTREMITY: ICD-10-CM

## 2024-07-16 DIAGNOSIS — M51.36 DEGENERATIVE DISC DISEASE, LUMBAR: ICD-10-CM

## 2024-07-16 DIAGNOSIS — M19.019 ACROMIOCLAVICULAR JOINT ARTHRITIS, UNSPECIFIED LATERALITY: ICD-10-CM

## 2024-07-16 NOTE — TELEPHONE ENCOUNTER
Pt is requesting a refill for her   oxyCODONE HCl (OXY-IR) 10 MG immediate release tablet     Please send to [facility] on [address]    Thank you

## 2024-07-17 RX ORDER — OXYCODONE HYDROCHLORIDE 10 MG/1
10 TABLET ORAL EVERY 8 HOURS PRN
Qty: 90 TABLET | Refills: 0 | Status: SHIPPED | OUTPATIENT
Start: 2024-07-17 | End: 2024-08-16

## 2024-07-18 RX ORDER — TRIAMCINOLONE ACETONIDE 40 MG/ML
40 INJECTION, SUSPENSION INTRA-ARTICULAR; INTRAMUSCULAR ONCE
Status: COMPLETED | OUTPATIENT
Start: 2024-07-18 | End: 2024-07-09

## 2024-07-27 ENCOUNTER — APPOINTMENT (OUTPATIENT)
Dept: CT IMAGING | Age: 59
End: 2024-07-27
Payer: MEDICARE

## 2024-07-27 ENCOUNTER — APPOINTMENT (OUTPATIENT)
Dept: GENERAL RADIOLOGY | Age: 59
End: 2024-07-27
Payer: MEDICARE

## 2024-07-27 ENCOUNTER — HOSPITAL ENCOUNTER (EMERGENCY)
Age: 59
Discharge: HOME | End: 2024-07-27
Payer: MEDICARE

## 2024-07-27 VITALS
TEMPERATURE: 98.4 F | DIASTOLIC BLOOD PRESSURE: 72 MMHG | HEIGHT: 69 IN | WEIGHT: 138.01 LBS | RESPIRATION RATE: 17 BRPM | HEART RATE: 84 BPM | OXYGEN SATURATION: 100 % | SYSTOLIC BLOOD PRESSURE: 124 MMHG | BODY MASS INDEX: 20.44 KG/M2
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
## 2024-07-27 DIAGNOSIS — S40.011A CONTUSION OF MULTIPLE SITES OF RIGHT SHOULDER, INITIAL ENCOUNTER: ICD-10-CM

## 2024-07-27 DIAGNOSIS — S40.022A CONTUSION OF LEFT UPPER ARM, INITIAL ENCOUNTER: ICD-10-CM

## 2024-07-27 DIAGNOSIS — S00.03XA CONTUSION OF SCALP, INITIAL ENCOUNTER: ICD-10-CM

## 2024-07-27 LAB
ANION GAP SERPL CALCULATED.3IONS-SCNC: 10 MMOL/L (ref 3–16)
BASOPHILS # BLD: 0.1 K/UL (ref 0–0.2)
BASOPHILS NFR BLD: 1.5 %
BUN SERPL-MCNC: 14 MG/DL (ref 7–20)
CALCIUM SERPL-MCNC: 10 MG/DL (ref 8.3–10.6)
CHLORIDE SERPL-SCNC: 100 MMOL/L (ref 99–110)
CO2 SERPL-SCNC: 29 MMOL/L (ref 21–32)
CREAT SERPL-MCNC: 0.9 MG/DL (ref 0.6–1.1)
DEPRECATED RDW RBC AUTO: 14.3 % (ref 12.4–15.4)
EOSINOPHIL # BLD: 0.5 K/UL (ref 0–0.6)
EOSINOPHIL NFR BLD: 5.5 %
GFR SERPLBLD CREATININE-BSD FMLA CKD-EPI: 73 ML/MIN/{1.73_M2}
GLUCOSE SERPL-MCNC: 78 MG/DL (ref 70–99)
HCT VFR BLD AUTO: 38.4 % (ref 36–48)
HGB BLD-MCNC: 13.4 G/DL (ref 12–16)
LYMPHOCYTES # BLD: 3 K/UL (ref 1–5.1)
LYMPHOCYTES NFR BLD: 32.7 %
MCH RBC QN AUTO: 30.7 PG (ref 26–34)
MCHC RBC AUTO-ENTMCNC: 34.8 G/DL (ref 31–36)
MCV RBC AUTO: 88.3 FL (ref 80–100)
MONOCYTES # BLD: 0.6 K/UL (ref 0–1.3)
MONOCYTES NFR BLD: 6.8 %
NEUTROPHILS # BLD: 4.9 K/UL (ref 1.7–7.7)
NEUTROPHILS NFR BLD: 53.5 %
PLATELET # BLD AUTO: 255 K/UL (ref 135–450)
PMV BLD AUTO: 8.4 FL (ref 5–10.5)
POTASSIUM SERPL-SCNC: 3.7 MMOL/L (ref 3.5–5.1)
RBC # BLD AUTO: 4.36 M/UL (ref 4–5.2)
SODIUM SERPL-SCNC: 139 MMOL/L (ref 136–145)
WBC # BLD AUTO: 9.1 K/UL (ref 4–11)

## 2024-07-27 PROCEDURE — 73060 X-RAY EXAM OF HUMERUS: CPT

## 2024-07-27 PROCEDURE — 73030 X-RAY EXAM OF SHOULDER: CPT

## 2024-07-27 PROCEDURE — 72125 CT NECK SPINE W/O DYE: CPT

## 2024-07-27 PROCEDURE — 85025 COMPLETE CBC W/AUTO DIFF WBC: CPT

## 2024-07-27 PROCEDURE — 80048 BASIC METABOLIC PNL TOTAL CA: CPT

## 2024-07-27 PROCEDURE — 70450 CT HEAD/BRAIN W/O DYE: CPT

## 2024-07-27 PROCEDURE — 99284 EMERGENCY DEPT VISIT MOD MDM: CPT

## 2024-07-27 ASSESSMENT — PAIN - FUNCTIONAL ASSESSMENT
PAIN_FUNCTIONAL_ASSESSMENT: 0-10
PAIN_FUNCTIONAL_ASSESSMENT: 0-10

## 2024-07-27 ASSESSMENT — PAIN DESCRIPTION - LOCATION
LOCATION: HEAD;NECK
LOCATION: FACE;HIP

## 2024-07-27 ASSESSMENT — PAIN DESCRIPTION - DESCRIPTORS
DESCRIPTORS: SORE
DESCRIPTORS: SHARP;SHOOTING

## 2024-07-27 ASSESSMENT — PAIN DESCRIPTION - PAIN TYPE
TYPE: ACUTE PAIN
TYPE: CHRONIC PAIN

## 2024-07-27 ASSESSMENT — PAIN DESCRIPTION - FREQUENCY
FREQUENCY: CONTINUOUS
FREQUENCY: CONTINUOUS

## 2024-07-27 ASSESSMENT — PAIN DESCRIPTION - ORIENTATION
ORIENTATION: LOWER
ORIENTATION: RIGHT

## 2024-07-27 ASSESSMENT — PAIN SCALES - GENERAL
PAINLEVEL_OUTOF10: 10
PAINLEVEL_OUTOF10: 8

## 2024-07-27 NOTE — ED TRIAGE NOTES
Pt states that she fell last week and hit head on toilet. Feels that she did have LOC.  C/o HA and neck pain.  Denies thinners.

## 2024-07-28 NOTE — ED PROVIDER NOTES
[facility] EMERGENCY DEPARTMENT  eMERGENCY dEPARTMENT eNCOUnter    Pt Name: @@  MRN: [id number]  Wawnsxssd1965  Date of evaluation: 7/27/2024  Provider: MARY Forbes CNP    CHIEF COMPLAINT       Chief Complaint   Patient presents with    Fall     Pt states that she fell last week and hit head on toilet. Feels that she did have LOC.  C/o HA and neck pain.  Denies thinners.          HISTORY OF PRESENT ILLNESS  (Location/Symptom, Timing/Onset, Context/Setting, Quality, Duration, Modifying Factors, Severity.)   Shana Martel is a 58 y.o. female who presents to the emergencydepartment PMHx anxiety, neuropathy, CVA, arthritis, asthma, hypertension, tonsillectomy      Anticoagulation therapy: None  CODE STATUS full  Lives at home with her niece    HPI provided by the patient    Patient is currently in a wheelchair.  She reports that 5 days ago she fell in her bathroom at home striking the toilet and several other objects in the bathroom.  She believes she struck the right side of her head right shoulder right upper arm and left shoulder and left upper arm.  Unaware if she had any loss of consciousness.  There is been no change in mental status as observed by her niece who is with her and with whom she lives.  However she states that because of the ongoing head pain and neck pain and shoulder pain they felt like she needed to come in to be evaluated.  With her history of stroke and ankle surgery with hardware and neuropathy she does have mobility issues and does fall frequently according to the patient..    Nursing Notes were reviewed and I agree.    REVIEW OF SYSTEMS    (2-9 systems for level 4, 10 or more for level 5)     Review of Systems   Musculoskeletal:         As stated in HPI   All other systems reviewed and are negative.      Except as noted above the remainder of the review of systems was reviewed and negative.       PAST MEDICAL HISTORY         Diagnosis Date    Anxiety

## 2024-08-02 ENCOUNTER — COMMUNITY OUTREACH (OUTPATIENT)
Dept: INTERNAL MEDICINE CLINIC | Age: 59
End: 2024-08-02

## 2024-08-09 ENCOUNTER — OFFICE VISIT (OUTPATIENT)
Dept: INTERNAL MEDICINE CLINIC | Age: 59
End: 2024-08-09
Payer: MEDICARE

## 2024-08-09 VITALS
HEART RATE: 69 BPM | SYSTOLIC BLOOD PRESSURE: 140 MMHG | OXYGEN SATURATION: 94 % | DIASTOLIC BLOOD PRESSURE: 80 MMHG | WEIGHT: 146 LBS | BODY MASS INDEX: 21.56 KG/M2

## 2024-08-09 DIAGNOSIS — M79.605 CHRONIC PAIN OF LEFT LOWER EXTREMITY: Primary | ICD-10-CM

## 2024-08-09 DIAGNOSIS — G89.29 CHRONIC PAIN OF LEFT LOWER EXTREMITY: Primary | ICD-10-CM

## 2024-08-09 PROCEDURE — G8427 DOCREV CUR MEDS BY ELIG CLIN: HCPCS | Performed by: STUDENT IN AN ORGANIZED HEALTH CARE EDUCATION/TRAINING PROGRAM

## 2024-08-09 PROCEDURE — 3077F SYST BP >= 140 MM HG: CPT | Performed by: STUDENT IN AN ORGANIZED HEALTH CARE EDUCATION/TRAINING PROGRAM

## 2024-08-09 PROCEDURE — 99213 OFFICE O/P EST LOW 20 MIN: CPT | Performed by: STUDENT IN AN ORGANIZED HEALTH CARE EDUCATION/TRAINING PROGRAM

## 2024-08-09 PROCEDURE — 3017F COLORECTAL CA SCREEN DOC REV: CPT | Performed by: STUDENT IN AN ORGANIZED HEALTH CARE EDUCATION/TRAINING PROGRAM

## 2024-08-09 PROCEDURE — G8420 CALC BMI NORM PARAMETERS: HCPCS | Performed by: STUDENT IN AN ORGANIZED HEALTH CARE EDUCATION/TRAINING PROGRAM

## 2024-08-09 PROCEDURE — 3079F DIAST BP 80-89 MM HG: CPT | Performed by: STUDENT IN AN ORGANIZED HEALTH CARE EDUCATION/TRAINING PROGRAM

## 2024-08-09 PROCEDURE — 1036F TOBACCO NON-USER: CPT | Performed by: STUDENT IN AN ORGANIZED HEALTH CARE EDUCATION/TRAINING PROGRAM

## 2024-08-09 PROCEDURE — 96372 THER/PROPH/DIAG INJ SC/IM: CPT | Performed by: STUDENT IN AN ORGANIZED HEALTH CARE EDUCATION/TRAINING PROGRAM

## 2024-08-09 RX ORDER — KETOROLAC TROMETHAMINE 30 MG/ML
30 INJECTION, SOLUTION INTRAMUSCULAR; INTRAVENOUS ONCE
Status: COMPLETED | OUTPATIENT
Start: 2024-08-09 | End: 2024-08-09

## 2024-08-09 RX ORDER — OXYCODONE HYDROCHLORIDE 15 MG/1
15 TABLET ORAL EVERY 8 HOURS PRN
Qty: 90 TABLET | Refills: 0 | Status: SHIPPED | OUTPATIENT
Start: 2024-08-09 | End: 2024-09-08

## 2024-08-09 RX ADMIN — KETOROLAC TROMETHAMINE 30 MG: 30 INJECTION, SOLUTION INTRAMUSCULAR; INTRAVENOUS at 16:34

## 2024-08-09 NOTE — PROGRESS NOTES
will consider  - Reviewed medications, cutting down on sedating medications    Anxiety and depression  - Still not interested in SSRI    Reported seizure disorer    Hypertension: Blood pressure marginal but usually overall controlled    Hx of stroke  - Recommend statin  Neuropathy      See visit diagnosis and associated orders below.  Problem List    None        Canelo Galo MD      Discussed use, benefit, and side effects of prescribed medications.  Barriers to medication compliance addressed.  Discussed all ordered testing and labs. All patient questions answered. Patient agreeable with plan above.     Please note that this chart was generated using dragon dictation software.  Although every effort was made to ensure the accuracy of this automated transcription, some errors in transcription may have occurred.

## 2024-08-17 DIAGNOSIS — F41.1 GAD (GENERALIZED ANXIETY DISORDER): ICD-10-CM

## 2024-08-19 DIAGNOSIS — G89.29 CHRONIC PAIN OF LEFT LOWER EXTREMITY: ICD-10-CM

## 2024-08-19 DIAGNOSIS — F41.1 GAD (GENERALIZED ANXIETY DISORDER): ICD-10-CM

## 2024-08-19 DIAGNOSIS — M79.605 CHRONIC PAIN OF LEFT LOWER EXTREMITY: ICD-10-CM

## 2024-08-19 RX ORDER — DIAZEPAM 10 MG/1
10 TABLET ORAL EVERY 8 HOURS PRN
Qty: 90 TABLET | Refills: 0 | Status: SHIPPED | OUTPATIENT
Start: 2024-08-19 | End: 2024-09-18

## 2024-08-20 RX ORDER — OXYCODONE HYDROCHLORIDE 15 MG/1
15 TABLET ORAL EVERY 8 HOURS PRN
Qty: 90 TABLET | Refills: 0 | OUTPATIENT
Start: 2024-08-20 | End: 2024-09-19

## 2024-08-20 RX ORDER — DIAZEPAM 10 MG
10 TABLET ORAL EVERY 8 HOURS PRN
Qty: 90 TABLET | Refills: 0 | OUTPATIENT
Start: 2024-08-20 | End: 2024-09-19

## 2024-08-22 RX ORDER — METOPROLOL SUCCINATE 100 MG/1
100 TABLET, EXTENDED RELEASE ORAL DAILY
Qty: 90 TABLET | Refills: 3 | Status: SHIPPED | OUTPATIENT
Start: 2024-08-22

## 2024-08-22 NOTE — TELEPHONE ENCOUNTER
Medication:   Requested Prescriptions     Pending Prescriptions Disp Refills    metoprolol succinate (TOPROL XL) 100 MG extended release tablet 30 tablet 11     Sig: TAKE 1 TABLET BY MOUTH DAILY *TAKE WITH 50 MG TABLET FOR A TOTAL  MG DAILY*       Last Filled:      Patient Phone Number: [phone] (home)     Last appt: 8/9/2024   Next appt: 9/10/2024  Future Appointments   Date Time Provider Department Center   9/10/2024  2:45 PM Canelo Galo MD [facility] ECC DEP

## 2024-08-22 NOTE — TELEPHONE ENCOUNTER
Pt requesting refill of     metoprolol succinate (TOPROL XL) 50 MG extended release tablet     Please send to [facility] on [address]

## 2024-08-23 ENCOUNTER — TELEPHONE (OUTPATIENT)
Dept: ORTHOPEDIC SURGERY | Age: 59
End: 2024-08-23

## 2024-08-26 DIAGNOSIS — M79.605 CHRONIC PAIN OF LEFT LOWER EXTREMITY: ICD-10-CM

## 2024-08-26 DIAGNOSIS — G89.29 CHRONIC PAIN OF LEFT LOWER EXTREMITY: ICD-10-CM

## 2024-08-27 NOTE — TELEPHONE ENCOUNTER
Medication:   Requested Prescriptions     Pending Prescriptions Disp Refills    oxyCODONE (OXY-IR) 15 MG immediate release tablet [Pharmacy Med Name: oxyCODONE HCl Oral Tablet 15 MG] 90 tablet 0     Sig: TAKE 1 TABLET BY MOUTH EVERY 8 HOURS AS NEEDED FOR PAIN FOR UP TO 30 DAYS. MAX 3 TABS DAILY       Last Filled:      Patient Phone Number: [phone] (home)     Last appt: 8/9/2024   Next appt: 9/10/2024  Future Appointments   Date Time Provider Department Center   9/10/2024  2:45 PM Canelo Galo MD [facility] ECC DEP

## 2024-08-27 NOTE — TELEPHONE ENCOUNTER
Future Appointments   Date Time Provider Department Center   9/10/2024  2:45 PM Canelo Galo MD [facility] BS ECC DEP       Last appt 8/9/24

## 2024-08-28 RX ORDER — OXYCODONE HYDROCHLORIDE 15 MG/1
15 TABLET ORAL EVERY 8 HOURS PRN
Qty: 90 TABLET | Refills: 0 | Status: SHIPPED | OUTPATIENT
Start: 2024-09-08 | End: 2024-10-08

## 2024-09-03 ENCOUNTER — LAB (OUTPATIENT)
Dept: INTERNAL MEDICINE CLINIC | Age: 59
End: 2024-09-03
Payer: MEDICARE

## 2024-09-03 ENCOUNTER — TELEPHONE (OUTPATIENT)
Dept: INTERNAL MEDICINE CLINIC | Age: 59
End: 2024-09-03

## 2024-09-03 DIAGNOSIS — G89.29 CHRONIC PAIN OF LEFT LOWER EXTREMITY: Primary | ICD-10-CM

## 2024-09-03 DIAGNOSIS — M79.605 CHRONIC PAIN OF LEFT LOWER EXTREMITY: Primary | ICD-10-CM

## 2024-09-03 PROCEDURE — 96372 THER/PROPH/DIAG INJ SC/IM: CPT | Performed by: STUDENT IN AN ORGANIZED HEALTH CARE EDUCATION/TRAINING PROGRAM

## 2024-09-03 RX ORDER — KETOROLAC TROMETHAMINE 30 MG/ML
30 INJECTION, SOLUTION INTRAMUSCULAR; INTRAVENOUS ONCE
Status: CANCELLED | OUTPATIENT
Start: 2024-09-03 | End: 2024-09-03

## 2024-09-03 RX ORDER — KETOROLAC TROMETHAMINE 30 MG/ML
30 INJECTION, SOLUTION INTRAMUSCULAR; INTRAVENOUS ONCE
Status: COMPLETED | OUTPATIENT
Start: 2024-09-03 | End: 2024-09-03

## 2024-09-03 RX ADMIN — KETOROLAC TROMETHAMINE 30 MG: 30 INJECTION, SOLUTION INTRAMUSCULAR; INTRAVENOUS at 16:02

## 2024-09-05 DIAGNOSIS — F41.1 GAD (GENERALIZED ANXIETY DISORDER): ICD-10-CM

## 2024-09-05 DIAGNOSIS — G89.29 CHRONIC PAIN OF LEFT LOWER EXTREMITY: ICD-10-CM

## 2024-09-05 DIAGNOSIS — M79.605 CHRONIC PAIN OF LEFT LOWER EXTREMITY: ICD-10-CM

## 2024-09-05 NOTE — TELEPHONE ENCOUNTER
Medication:   Requested Prescriptions     Pending Prescriptions Disp Refills    diazePAM (VALIUM) 10 MG tablet 90 tablet 0     Sig: Take 1 tablet by mouth every 8 hours as needed for Anxiety for up to 30 days. for anxiety for up to 30 days Max Daily Amount: 30 mg    oxyCODONE (OXY-IR) 15 MG immediate release tablet 90 tablet 0     Sig: Take 1 tablet by mouth every 8 hours as needed for Pain for up to 30 days. Max Daily Amount: 45 mg       Last Filled:      Patient Phone Number: [phone] (home)     Last appt: 8/9/2024   Next appt: 9/10/2024  Future Appointments   Date Time Provider Department Center   9/10/2024  2:45 PM Canelo Galo MD [facility] ECC DEP

## 2024-09-05 NOTE — TELEPHONE ENCOUNTER
Pt is requesting refills for:    oxyCODONE (OXY-IR) 15 MG immediate release tablet     diazePAM (VALIUM) 10 MG tablet     Please send to [facility] on [address]

## 2024-09-06 RX ORDER — DIAZEPAM 10 MG
10 TABLET ORAL EVERY 8 HOURS PRN
Qty: 90 TABLET | Refills: 0 | Status: SHIPPED | OUTPATIENT
Start: 2024-09-19 | End: 2024-10-19

## 2024-09-06 RX ORDER — OXYCODONE HYDROCHLORIDE 15 MG/1
15 TABLET ORAL EVERY 8 HOURS PRN
Qty: 90 TABLET | Refills: 0 | Status: SHIPPED | OUTPATIENT
Start: 2024-09-08 | End: 2024-10-08

## 2024-09-10 ENCOUNTER — OFFICE VISIT (OUTPATIENT)
Dept: INTERNAL MEDICINE CLINIC | Age: 59
End: 2024-09-10
Payer: MEDICARE

## 2024-09-10 VITALS
BODY MASS INDEX: 22 KG/M2 | SYSTOLIC BLOOD PRESSURE: 142 MMHG | WEIGHT: 149 LBS | HEART RATE: 64 BPM | OXYGEN SATURATION: 96 % | DIASTOLIC BLOOD PRESSURE: 102 MMHG

## 2024-09-10 DIAGNOSIS — I10 PRIMARY HYPERTENSION: ICD-10-CM

## 2024-09-10 DIAGNOSIS — F33.1 MODERATE EPISODE OF RECURRENT MAJOR DEPRESSIVE DISORDER (HCC): Primary | ICD-10-CM

## 2024-09-10 DIAGNOSIS — R32 URINARY INCONTINENCE, UNSPECIFIED TYPE: ICD-10-CM

## 2024-09-10 DIAGNOSIS — J32.9 SINUSITIS, UNSPECIFIED CHRONICITY, UNSPECIFIED LOCATION: ICD-10-CM

## 2024-09-10 DIAGNOSIS — M51.36 DEGENERATIVE DISC DISEASE, LUMBAR: ICD-10-CM

## 2024-09-10 PROCEDURE — 3017F COLORECTAL CA SCREEN DOC REV: CPT | Performed by: STUDENT IN AN ORGANIZED HEALTH CARE EDUCATION/TRAINING PROGRAM

## 2024-09-10 PROCEDURE — 3077F SYST BP >= 140 MM HG: CPT | Performed by: STUDENT IN AN ORGANIZED HEALTH CARE EDUCATION/TRAINING PROGRAM

## 2024-09-10 PROCEDURE — G8427 DOCREV CUR MEDS BY ELIG CLIN: HCPCS | Performed by: STUDENT IN AN ORGANIZED HEALTH CARE EDUCATION/TRAINING PROGRAM

## 2024-09-10 PROCEDURE — 99213 OFFICE O/P EST LOW 20 MIN: CPT | Performed by: STUDENT IN AN ORGANIZED HEALTH CARE EDUCATION/TRAINING PROGRAM

## 2024-09-10 PROCEDURE — 3080F DIAST BP >= 90 MM HG: CPT | Performed by: STUDENT IN AN ORGANIZED HEALTH CARE EDUCATION/TRAINING PROGRAM

## 2024-09-10 PROCEDURE — 1036F TOBACCO NON-USER: CPT | Performed by: STUDENT IN AN ORGANIZED HEALTH CARE EDUCATION/TRAINING PROGRAM

## 2024-09-10 PROCEDURE — G8420 CALC BMI NORM PARAMETERS: HCPCS | Performed by: STUDENT IN AN ORGANIZED HEALTH CARE EDUCATION/TRAINING PROGRAM

## 2024-09-10 RX ORDER — SULFAMETHOXAZOLE/TRIMETHOPRIM 800-160 MG
1 TABLET ORAL 2 TIMES DAILY
Qty: 14 TABLET | Refills: 0 | Status: SHIPPED | OUTPATIENT
Start: 2024-09-10 | End: 2024-09-17

## 2024-09-10 RX ORDER — OXYBUTYNIN CHLORIDE 10 MG/1
10 TABLET, EXTENDED RELEASE ORAL DAILY
Qty: 30 TABLET | Refills: 3 | Status: SHIPPED | OUTPATIENT
Start: 2024-09-10

## 2024-09-10 ASSESSMENT — ENCOUNTER SYMPTOMS
CHEST TIGHTNESS: 0
RHINORRHEA: 1
ABDOMINAL PAIN: 0
DIARRHEA: 0
BACK PAIN: 0
SORE THROAT: 0
COUGH: 1
NAUSEA: 0
VOMITING: 0
SINUS PAIN: 1
CONSTIPATION: 0

## 2024-09-27 ENCOUNTER — TELEPHONE (OUTPATIENT)
Dept: INTERNAL MEDICINE CLINIC | Age: 59
End: 2024-09-27

## 2024-09-30 ENCOUNTER — TELEPHONE (OUTPATIENT)
Dept: INTERNAL MEDICINE CLINIC | Age: 59
End: 2024-09-30

## 2024-09-30 DIAGNOSIS — M51.369 DEGENERATIVE DISC DISEASE, LUMBAR: Primary | ICD-10-CM

## 2024-09-30 RX ORDER — METHYLPREDNISOLONE 4 MG
TABLET, DOSE PACK ORAL
Qty: 1 KIT | Refills: 0 | Status: SHIPPED | OUTPATIENT
Start: 2024-09-30 | End: 2024-10-06

## 2024-09-30 NOTE — TELEPHONE ENCOUNTER
Pt called, her back gave out and she fell, very bruised and sore. This happened last week and it is not getting better. She tried ice and ibuprofen    Please advise    Thank you

## 2024-09-30 NOTE — TELEPHONE ENCOUNTER
Pt requesting medication go to [facility] on [address] from now on. Please send methylPREDNISolone (MEDROL DOSEPACK) 4 MG tablet  there as well instead of krogers.   Thank you

## 2024-10-03 DIAGNOSIS — F41.1 GAD (GENERALIZED ANXIETY DISORDER): ICD-10-CM

## 2024-10-03 DIAGNOSIS — G89.29 CHRONIC PAIN OF LEFT LOWER EXTREMITY: ICD-10-CM

## 2024-10-03 DIAGNOSIS — M79.605 CHRONIC PAIN OF LEFT LOWER EXTREMITY: ICD-10-CM

## 2024-10-03 RX ORDER — OXYCODONE HYDROCHLORIDE 15 MG/1
TABLET ORAL
Qty: 90 TABLET | Refills: 0 | OUTPATIENT
Start: 2024-10-03

## 2024-10-03 RX ORDER — OXYCODONE HYDROCHLORIDE 15 MG/1
15 TABLET ORAL EVERY 8 HOURS PRN
Qty: 90 TABLET | Refills: 0 | Status: SHIPPED | OUTPATIENT
Start: 2024-10-03 | End: 2024-11-02

## 2024-10-03 RX ORDER — DIAZEPAM 10 MG
10 TABLET ORAL EVERY 8 HOURS PRN
Qty: 90 TABLET | Refills: 0 | Status: SHIPPED | OUTPATIENT
Start: 2024-10-03 | End: 2024-11-02

## 2024-10-03 NOTE — TELEPHONE ENCOUNTER
Pt is requesting refills for her:    diazePAM (VALIUM) 10 MG tablet     oxyCODONE (OXY-IR) 15 MG immediate release tablet     Please send to [facility] [place]

## 2024-10-03 NOTE — TELEPHONE ENCOUNTER
Pts nephew(Haroldo) called, Shana had a nervous break down this morning. Her depression is getting worse. She is requesting a referral for Psychology.    Please advise    Thank you

## 2024-10-03 NOTE — TELEPHONE ENCOUNTER
Future Appointments   Date Time Provider Department Center   11/13/2024  3:30 PM Wiley Spencer, PhD [facility]       Last appt 9/10/24

## 2024-10-07 ENCOUNTER — TELEPHONE (OUTPATIENT)
Dept: INTERNAL MEDICINE CLINIC | Age: 59
End: 2024-10-07

## 2024-10-07 RX ORDER — MUPIROCIN 20 MG/G
OINTMENT TOPICAL
Qty: 30 G | Refills: 0 | Status: SHIPPED | OUTPATIENT
Start: 2024-10-07 | End: 2024-10-14

## 2024-10-07 NOTE — TELEPHONE ENCOUNTER
Aleshia called in stating that Shana fell yesterday into a laundry basket and cabinet and her screw in her left foot, big toe is starting to poke through. I advised going to the ER but they declined wanting to know if you could please send in a cream to help.     Please advise.

## 2024-10-07 NOTE — TELEPHONE ENCOUNTER
They called back and wanted to know what the can put on this to stop hurting , and the nail is turning black,

## 2024-11-04 DIAGNOSIS — F41.1 GAD (GENERALIZED ANXIETY DISORDER): ICD-10-CM

## 2024-11-04 DIAGNOSIS — M79.605 CHRONIC PAIN OF LEFT LOWER EXTREMITY: ICD-10-CM

## 2024-11-04 DIAGNOSIS — G89.29 CHRONIC PAIN OF LEFT LOWER EXTREMITY: ICD-10-CM

## 2024-11-04 NOTE — TELEPHONE ENCOUNTER
Future Appointments   Date Time Provider Department Center   11/7/2024  9:00 AM Canelo Galo MD [facility] BS ECC DEP   11/13/2024  2:30 PM Wiley Spencer, PhD [facility]       Last appt 9/10/24

## 2024-11-05 RX ORDER — DIAZEPAM 10 MG/1
10 TABLET ORAL EVERY 8 HOURS PRN
Qty: 90 TABLET | Refills: 0 | Status: SHIPPED | OUTPATIENT
Start: 2024-11-05 | End: 2024-12-05

## 2024-11-05 RX ORDER — OXYCODONE HYDROCHLORIDE 15 MG/1
15 TABLET ORAL EVERY 8 HOURS PRN
Qty: 90 TABLET | Refills: 0 | Status: SHIPPED | OUTPATIENT
Start: 2024-11-05 | End: 2024-12-05

## 2024-11-07 ENCOUNTER — OFFICE VISIT (OUTPATIENT)
Dept: INTERNAL MEDICINE CLINIC | Age: 59
End: 2024-11-07
Payer: MEDICARE

## 2024-11-07 VITALS
HEART RATE: 55 BPM | DIASTOLIC BLOOD PRESSURE: 60 MMHG | BODY MASS INDEX: 22.45 KG/M2 | WEIGHT: 152 LBS | SYSTOLIC BLOOD PRESSURE: 100 MMHG | OXYGEN SATURATION: 94 %

## 2024-11-07 DIAGNOSIS — G43.109 MIGRAINE WITH AURA AND WITHOUT STATUS MIGRAINOSUS, NOT INTRACTABLE: ICD-10-CM

## 2024-11-07 DIAGNOSIS — R32 URINARY INCONTINENCE, UNSPECIFIED TYPE: ICD-10-CM

## 2024-11-07 DIAGNOSIS — F33.1 MODERATE EPISODE OF RECURRENT MAJOR DEPRESSIVE DISORDER (HCC): ICD-10-CM

## 2024-11-07 DIAGNOSIS — M51.362 DEGENERATION OF INTERVERTEBRAL DISC OF LUMBAR REGION WITH DISCOGENIC BACK PAIN AND LOWER EXTREMITY PAIN: ICD-10-CM

## 2024-11-07 DIAGNOSIS — I10 PRIMARY HYPERTENSION: Primary | ICD-10-CM

## 2024-11-07 PROCEDURE — 99213 OFFICE O/P EST LOW 20 MIN: CPT | Performed by: STUDENT IN AN ORGANIZED HEALTH CARE EDUCATION/TRAINING PROGRAM

## 2024-11-07 PROCEDURE — G8420 CALC BMI NORM PARAMETERS: HCPCS | Performed by: STUDENT IN AN ORGANIZED HEALTH CARE EDUCATION/TRAINING PROGRAM

## 2024-11-07 PROCEDURE — G8427 DOCREV CUR MEDS BY ELIG CLIN: HCPCS | Performed by: STUDENT IN AN ORGANIZED HEALTH CARE EDUCATION/TRAINING PROGRAM

## 2024-11-07 PROCEDURE — 3074F SYST BP LT 130 MM HG: CPT | Performed by: STUDENT IN AN ORGANIZED HEALTH CARE EDUCATION/TRAINING PROGRAM

## 2024-11-07 PROCEDURE — 1036F TOBACCO NON-USER: CPT | Performed by: STUDENT IN AN ORGANIZED HEALTH CARE EDUCATION/TRAINING PROGRAM

## 2024-11-07 PROCEDURE — 3078F DIAST BP <80 MM HG: CPT | Performed by: STUDENT IN AN ORGANIZED HEALTH CARE EDUCATION/TRAINING PROGRAM

## 2024-11-07 PROCEDURE — 3017F COLORECTAL CA SCREEN DOC REV: CPT | Performed by: STUDENT IN AN ORGANIZED HEALTH CARE EDUCATION/TRAINING PROGRAM

## 2024-11-07 PROCEDURE — G8484 FLU IMMUNIZE NO ADMIN: HCPCS | Performed by: STUDENT IN AN ORGANIZED HEALTH CARE EDUCATION/TRAINING PROGRAM

## 2024-11-07 RX ORDER — RIZATRIPTAN BENZOATE 10 MG/1
10 TABLET ORAL DAILY PRN
Qty: 12 TABLET | Refills: 5 | Status: SHIPPED | OUTPATIENT
Start: 2024-11-07

## 2024-11-07 SDOH — ECONOMIC STABILITY: FOOD INSECURITY: WITHIN THE PAST 12 MONTHS, THE FOOD YOU BOUGHT JUST DIDN'T LAST AND YOU DIDN'T HAVE MONEY TO GET MORE.: OFTEN TRUE

## 2024-11-07 SDOH — ECONOMIC STABILITY: FOOD INSECURITY: WITHIN THE PAST 12 MONTHS, YOU WORRIED THAT YOUR FOOD WOULD RUN OUT BEFORE YOU GOT MONEY TO BUY MORE.: OFTEN TRUE

## 2024-11-07 SDOH — ECONOMIC STABILITY: INCOME INSECURITY: HOW HARD IS IT FOR YOU TO PAY FOR THE VERY BASICS LIKE FOOD, HOUSING, MEDICAL CARE, AND HEATING?: VERY HARD

## 2024-11-07 ASSESSMENT — ENCOUNTER SYMPTOMS
SORE THROAT: 0
CHEST TIGHTNESS: 0
VOMITING: 0
NAUSEA: 0
CONSTIPATION: 0
ABDOMINAL PAIN: 0
DIARRHEA: 0
BACK PAIN: 0
COUGH: 0

## 2024-11-07 NOTE — PROGRESS NOTES
dysuria, flank pain and urgency.   Musculoskeletal:  Negative for arthralgias, back pain and joint swelling.   Skin:  Negative for rash.   Neurological:  Negative for dizziness, weakness and numbness.   Psychiatric/Behavioral:  Negative for sleep disturbance. The patient is not nervous/anxious.           Objective   Physical Exam  Constitutional:       General: She is not in acute distress.  HENT:      Mouth/Throat:      Mouth: Mucous membranes are moist.   Eyes:      General: No scleral icterus.     Pupils: Pupils are equal, round, and reactive to light.   Cardiovascular:      Rate and Rhythm: Normal rate and regular rhythm.      Pulses: Normal pulses.      Heart sounds: No murmur heard.     No gallop.   Pulmonary:      Effort: Pulmonary effort is normal. No respiratory distress.      Breath sounds: Normal breath sounds. No wheezing, rhonchi or rales.   Abdominal:      General: Abdomen is flat. Bowel sounds are normal. There is no distension.      Palpations: Abdomen is soft.      Tenderness: There is no abdominal tenderness.   Musculoskeletal:      Right lower leg: No edema.      Left lower leg: No edema.   Skin:     General: Skin is warm and dry.      Findings: No erythema or rash.   Neurological:      Mental Status: She is alert and oriented to person, place, and time.      Comments: Off balance     Psychiatric:         Mood and Affect: Mood normal.         Behavior: Behavior normal.                  An electronic signature was used to authenticate this note.    --Canelo Galo MD

## 2024-11-08 NOTE — ASSESSMENT & PLAN NOTE
Has had surgery with neurosurgery in the past.  She reports rods in her spine.  She continues to have chronic back pain.  She does have difficulty with mobility but reports it is from her previous left foot third

## 2024-11-08 NOTE — ASSESSMENT & PLAN NOTE
Has follow-up with psychology soon.  She does not want to try antidepressants.  She is worried that antidepressants will cause her seizures.

## 2024-12-02 DIAGNOSIS — F41.1 GAD (GENERALIZED ANXIETY DISORDER): ICD-10-CM

## 2024-12-02 DIAGNOSIS — M79.605 CHRONIC PAIN OF LEFT LOWER EXTREMITY: ICD-10-CM

## 2024-12-02 DIAGNOSIS — G89.29 CHRONIC PAIN OF LEFT LOWER EXTREMITY: ICD-10-CM

## 2024-12-02 RX ORDER — DIAZEPAM 10 MG/1
10 TABLET ORAL EVERY 8 HOURS PRN
Qty: 90 TABLET | Refills: 0 | Status: SHIPPED | OUTPATIENT
Start: 2024-12-02 | End: 2025-01-01

## 2024-12-02 RX ORDER — OXYCODONE HYDROCHLORIDE 15 MG/1
15 TABLET ORAL EVERY 8 HOURS PRN
Qty: 90 TABLET | Refills: 0 | Status: SHIPPED | OUTPATIENT
Start: 2024-12-02 | End: 2025-01-01

## 2024-12-02 RX ORDER — METOPROLOL SUCCINATE 50 MG/1
50 TABLET, EXTENDED RELEASE ORAL DAILY
Qty: 90 TABLET | Refills: 1 | Status: SHIPPED | OUTPATIENT
Start: 2024-12-02

## 2024-12-02 NOTE — TELEPHONE ENCOUNTER
Future Appointments   Date Time Provider Department Center   1/7/2025  2:00 PM Wiley Spencer, PhD [facility]       Last appt 11/7/24

## 2024-12-16 DIAGNOSIS — R32 URINARY INCONTINENCE, UNSPECIFIED TYPE: ICD-10-CM

## 2024-12-16 DIAGNOSIS — G43.109 MIGRAINE WITH AURA AND WITHOUT STATUS MIGRAINOSUS, NOT INTRACTABLE: ICD-10-CM

## 2024-12-16 NOTE — TELEPHONE ENCOUNTER
Future Appointments   Date Time Provider Department Center   1/3/2025 12:45 PM Canelo Galo MD [facility] BS ECC DEP   1/7/2025  2:00 PM Wiley Spencer, PhD [facility]       Last appt 11/7/24

## 2024-12-20 RX ORDER — LEVETIRACETAM 500 MG/1
500 TABLET ORAL EVERY 12 HOURS SCHEDULED
Qty: 60 TABLET | Refills: 11 | Status: SHIPPED | OUTPATIENT
Start: 2024-12-20

## 2024-12-20 RX ORDER — GABAPENTIN 300 MG/1
300 CAPSULE ORAL 3 TIMES DAILY
Qty: 90 CAPSULE | Refills: 5 | Status: SHIPPED | OUTPATIENT
Start: 2024-12-20 | End: 2025-06-18

## 2024-12-20 RX ORDER — OXYBUTYNIN CHLORIDE 10 MG/1
10 TABLET, EXTENDED RELEASE ORAL DAILY
Qty: 30 TABLET | Refills: 3 | Status: SHIPPED | OUTPATIENT
Start: 2024-12-20

## 2024-12-20 RX ORDER — METOPROLOL SUCCINATE 50 MG/1
50 TABLET, EXTENDED RELEASE ORAL DAILY
Qty: 90 TABLET | Refills: 1 | Status: SHIPPED | OUTPATIENT
Start: 2024-12-20

## 2024-12-20 RX ORDER — NORTRIPTYLINE HYDROCHLORIDE 25 MG/1
25 CAPSULE ORAL NIGHTLY
Qty: 90 CAPSULE | Refills: 3 | Status: SHIPPED | OUTPATIENT
Start: 2024-12-20

## 2024-12-20 RX ORDER — METOPROLOL SUCCINATE 100 MG/1
100 TABLET, EXTENDED RELEASE ORAL DAILY
Qty: 90 TABLET | Refills: 3 | Status: SHIPPED | OUTPATIENT
Start: 2024-12-20

## 2024-12-26 RX ORDER — METOPROLOL SUCCINATE 50 MG/1
50 TABLET, EXTENDED RELEASE ORAL DAILY
Qty: 90 TABLET | Refills: 1 | OUTPATIENT
Start: 2024-12-26

## 2024-12-26 NOTE — TELEPHONE ENCOUNTER
Future Appointments   Date Time Provider Department Center   1/3/2025 12:45 PM Canelo Galo MD [facility] BS ECC DEP   1/7/2025  2:00 PM Wiley Spencer, PhD [facility]     Last appt 11/7/24

## 2025-01-03 ENCOUNTER — OFFICE VISIT (OUTPATIENT)
Dept: INTERNAL MEDICINE CLINIC | Age: 60
End: 2025-01-03
Payer: MEDICARE

## 2025-01-03 VITALS — OXYGEN SATURATION: 95 % | HEART RATE: 60 BPM | DIASTOLIC BLOOD PRESSURE: 80 MMHG | SYSTOLIC BLOOD PRESSURE: 118 MMHG

## 2025-01-03 DIAGNOSIS — M79.605 CHRONIC PAIN OF LEFT LOWER EXTREMITY: ICD-10-CM

## 2025-01-03 DIAGNOSIS — F41.1 GAD (GENERALIZED ANXIETY DISORDER): ICD-10-CM

## 2025-01-03 DIAGNOSIS — G89.29 CHRONIC PAIN OF LEFT LOWER EXTREMITY: ICD-10-CM

## 2025-01-03 DIAGNOSIS — R29.6 FREQUENT FALLS: ICD-10-CM

## 2025-01-03 DIAGNOSIS — I10 PRIMARY HYPERTENSION: ICD-10-CM

## 2025-01-03 DIAGNOSIS — G43.109 MIGRAINE WITH AURA AND WITHOUT STATUS MIGRAINOSUS, NOT INTRACTABLE: Primary | ICD-10-CM

## 2025-01-03 DIAGNOSIS — F33.1 MODERATE EPISODE OF RECURRENT MAJOR DEPRESSIVE DISORDER (HCC): ICD-10-CM

## 2025-01-03 PROCEDURE — 3079F DIAST BP 80-89 MM HG: CPT | Performed by: STUDENT IN AN ORGANIZED HEALTH CARE EDUCATION/TRAINING PROGRAM

## 2025-01-03 PROCEDURE — 3074F SYST BP LT 130 MM HG: CPT | Performed by: STUDENT IN AN ORGANIZED HEALTH CARE EDUCATION/TRAINING PROGRAM

## 2025-01-03 PROCEDURE — 99213 OFFICE O/P EST LOW 20 MIN: CPT | Performed by: STUDENT IN AN ORGANIZED HEALTH CARE EDUCATION/TRAINING PROGRAM

## 2025-01-03 PROCEDURE — 1036F TOBACCO NON-USER: CPT | Performed by: STUDENT IN AN ORGANIZED HEALTH CARE EDUCATION/TRAINING PROGRAM

## 2025-01-03 PROCEDURE — G8420 CALC BMI NORM PARAMETERS: HCPCS | Performed by: STUDENT IN AN ORGANIZED HEALTH CARE EDUCATION/TRAINING PROGRAM

## 2025-01-03 PROCEDURE — M1308 PR FLU IMMUNIZE NO ADMIN: HCPCS | Performed by: STUDENT IN AN ORGANIZED HEALTH CARE EDUCATION/TRAINING PROGRAM

## 2025-01-03 PROCEDURE — 3017F COLORECTAL CA SCREEN DOC REV: CPT | Performed by: STUDENT IN AN ORGANIZED HEALTH CARE EDUCATION/TRAINING PROGRAM

## 2025-01-03 PROCEDURE — G8427 DOCREV CUR MEDS BY ELIG CLIN: HCPCS | Performed by: STUDENT IN AN ORGANIZED HEALTH CARE EDUCATION/TRAINING PROGRAM

## 2025-01-03 RX ORDER — DIAZEPAM 10 MG/1
10 TABLET ORAL EVERY 8 HOURS PRN
Qty: 90 TABLET | Refills: 0 | Status: SHIPPED | OUTPATIENT
Start: 2025-01-03 | End: 2025-02-02

## 2025-01-03 RX ORDER — OXYCODONE HYDROCHLORIDE 15 MG/1
15 TABLET ORAL EVERY 6 HOURS PRN
Qty: 110 TABLET | Refills: 0 | Status: SHIPPED | OUTPATIENT
Start: 2025-01-03 | End: 2025-02-02

## 2025-01-03 ASSESSMENT — PATIENT HEALTH QUESTIONNAIRE - PHQ9
9. THOUGHTS THAT YOU WOULD BE BETTER OFF DEAD, OR OF HURTING YOURSELF: SEVERAL DAYS
10. IF YOU CHECKED OFF ANY PROBLEMS, HOW DIFFICULT HAVE THESE PROBLEMS MADE IT FOR YOU TO DO YOUR WORK, TAKE CARE OF THINGS AT HOME, OR GET ALONG WITH OTHER PEOPLE: EXTREMELY DIFFICULT
SUM OF ALL RESPONSES TO PHQ QUESTIONS 1-9: 18
SUM OF ALL RESPONSES TO PHQ QUESTIONS 1-9: 17
8. MOVING OR SPEAKING SO SLOWLY THAT OTHER PEOPLE COULD HAVE NOTICED. OR THE OPPOSITE, BEING SO FIGETY OR RESTLESS THAT YOU HAVE BEEN MOVING AROUND A LOT MORE THAN USUAL: NEARLY EVERY DAY
SUM OF ALL RESPONSES TO PHQ9 QUESTIONS 1 & 2: 6
5. POOR APPETITE OR OVEREATING: NOT AT ALL
SUM OF ALL RESPONSES TO PHQ QUESTIONS 1-9: 18
2. FEELING DOWN, DEPRESSED OR HOPELESS: NEARLY EVERY DAY
6. FEELING BAD ABOUT YOURSELF - OR THAT YOU ARE A FAILURE OR HAVE LET YOURSELF OR YOUR FAMILY DOWN: NEARLY EVERY DAY
7. TROUBLE CONCENTRATING ON THINGS, SUCH AS READING THE NEWSPAPER OR WATCHING TELEVISION: SEVERAL DAYS
1. LITTLE INTEREST OR PLEASURE IN DOING THINGS: NEARLY EVERY DAY
SUM OF ALL RESPONSES TO PHQ QUESTIONS 1-9: 18
3. TROUBLE FALLING OR STAYING ASLEEP: SEVERAL DAYS
4. FEELING TIRED OR HAVING LITTLE ENERGY: NEARLY EVERY DAY

## 2025-01-03 ASSESSMENT — COLUMBIA-SUICIDE SEVERITY RATING SCALE - C-SSRS
1. WITHIN THE PAST MONTH, HAVE YOU WISHED YOU WERE DEAD OR WISHED YOU COULD GO TO SLEEP AND NOT WAKE UP?: YES
5. HAVE YOU STARTED TO WORK OUT OR WORKED OUT THE DETAILS OF HOW TO KILL YOURSELF? DO YOU INTEND TO CARRY OUT THIS PLAN?: YES
3. HAVE YOU BEEN THINKING ABOUT HOW YOU MIGHT KILL YOURSELF?: YES
6. HAVE YOU EVER DONE ANYTHING, STARTED TO DO ANYTHING, OR PREPARED TO DO ANYTHING TO END YOUR LIFE?: YES
2. HAVE YOU ACTUALLY HAD ANY THOUGHTS OF KILLING YOURSELF?: YES
4. HAVE YOU HAD THESE THOUGHTS AND HAD SOME INTENTION OF ACTING ON THEM?: YES
7. DID THIS OCCUR IN THE LAST THREE MONTHS: NO

## 2025-01-03 NOTE — PROGRESS NOTES
Shana Martel is a 59 y.o. female with a past medical history noted below who presents for routine follow up on chronic conditions and discussion of preventative health care.  Chief Complaint   Patient presents with    3 Month Follow-Up     Here for a f/u but just had a fall today and hit her face, head, and knee, she is falling more and more      Patient had a fall today. She lost her balance walking out of her room.       Past Medical History:   Diagnosis Date    Anxiety     Arthritis     Asthma     Hypertension     Neuropathy      Patient Active Problem List   Diagnosis    Acromioclavicular joint arthritis    Rotator cuff tendonitis    Adhesive capsulitis    S/P arthroscopy of shoulder    Sprain of left knee    Sprain of left ankle    Moderate episode of recurrent major depressive disorder (HCC)    Hypertension    Degenerative disc disease, lumbar    Conjunctivitis    Left foot pain    Weakness       Vitals:    01/03/25 1257   BP: 118/80   Site: Right Lower Arm   Position: Sitting   Cuff Size: Small Adult   Pulse: 60   SpO2: 95%     General: Alert and oriented X3. No acute distress  Eyes: PEERL. No scleral icterus noted. Normal appearing conjunctiva bilaterally  Ears: Normal TM and external canal bilaterally.  Oral cavity/Throat: No pharyngeal erythema. No oral lesions.  Cardiovascular: Heart is regular rate and rhythm. No murmurs noted. Radial pulses 2+. Posterior tibial pulses 2+. No lower extremity edema noted  Pulmonary: Lungs clear to auscultation bilaterally  Skin: Dry, warm. No obvious skin lesions or rashes noted.  Neuro: PEERL. EOM intact. No facial droop. Normal sensation in bilateral upper and lower extremities. Gait is normal    Assessment and Plan  Shana Martelpresents to clinic for follow-up on chronic conditions listed above below and discussion of preventative care recommendations.       Assessment & Plan  Migraine with aura and without status migrainosus, not intractable              Moderate

## 2025-01-07 ENCOUNTER — TELEPHONE (OUTPATIENT)
Dept: ADMINISTRATIVE | Age: 60
End: 2025-01-07

## 2025-01-07 NOTE — TELEPHONE ENCOUNTER
Submitted PA for diazePAM 10MG tablets   Via CMM Key: [id number] STATUS: pending     There was an error with your request  Eligibility could not be verified for this patient - patient not found. Please review patient information and re-submit.     Called plan @ [phone] pa started over the phone    Reference # [id number]    Turn around time is 24-72 hours     [phone] TO CHECK STATUS           If this requires a response please respond to the pool ( P MHCX PSC MEDICATION PRE-AUTH).      Thank you please advise patient.

## 2025-01-08 NOTE — TELEPHONE ENCOUNTER
The medication is APPROVED.      Approved thru 12-    If this requires a response please respond to the pool ( P MHCX PSC MEDICATION PRE-AUTH).      Thank you please advise patient.

## 2025-01-31 ENCOUNTER — APPOINTMENT (OUTPATIENT)
Dept: CT IMAGING | Age: 60
DRG: 309 | End: 2025-01-31
Payer: MEDICARE

## 2025-01-31 ENCOUNTER — HOSPITAL ENCOUNTER (INPATIENT)
Age: 60
LOS: 3 days | Discharge: HOME | DRG: 309 | End: 2025-02-04
Attending: EMERGENCY MEDICINE | Admitting: STUDENT IN AN ORGANIZED HEALTH CARE EDUCATION/TRAINING PROGRAM
Payer: MEDICARE

## 2025-01-31 ENCOUNTER — APPOINTMENT (OUTPATIENT)
Dept: GENERAL RADIOLOGY | Age: 60
DRG: 309 | End: 2025-01-31
Payer: MEDICARE

## 2025-01-31 DIAGNOSIS — G89.29 CHRONIC PAIN OF LEFT LOWER EXTREMITY: ICD-10-CM

## 2025-01-31 DIAGNOSIS — R29.6 RECURRENT FALLS: ICD-10-CM

## 2025-01-31 DIAGNOSIS — R55 SYNCOPE, UNSPECIFIED SYNCOPE TYPE: ICD-10-CM

## 2025-01-31 DIAGNOSIS — M79.605 CHRONIC PAIN OF LEFT LOWER EXTREMITY: ICD-10-CM

## 2025-01-31 DIAGNOSIS — F41.1 GAD (GENERALIZED ANXIETY DISORDER): ICD-10-CM

## 2025-01-31 DIAGNOSIS — R32 URINARY INCONTINENCE, UNSPECIFIED TYPE: ICD-10-CM

## 2025-01-31 DIAGNOSIS — S32.018A OTHER CLOSED FRACTURE OF FIRST LUMBAR VERTEBRA, INITIAL ENCOUNTER (HCC): ICD-10-CM

## 2025-01-31 DIAGNOSIS — R55 SYNCOPE: ICD-10-CM

## 2025-01-31 DIAGNOSIS — R00.1 BRADYCARDIA: Primary | ICD-10-CM

## 2025-01-31 DIAGNOSIS — I48.0 PAROXYSMAL ATRIAL FIBRILLATION (HCC): ICD-10-CM

## 2025-01-31 LAB
ALBUMIN SERPL-MCNC: 4.6 G/DL (ref 3.4–5)
ALBUMIN/GLOB SERPL: 1.2 {RATIO} (ref 1.1–2.2)
ALP SERPL-CCNC: 81 U/L (ref 40–129)
ALT SERPL-CCNC: 10 U/L (ref 10–40)
ANION GAP SERPL CALCULATED.3IONS-SCNC: 12 MMOL/L (ref 3–16)
AST SERPL-CCNC: 23 U/L (ref 15–37)
BASE EXCESS BLDV CALC-SCNC: 1.2 MMOL/L
BASOPHILS # BLD: 0.1 K/UL (ref 0–0.2)
BASOPHILS NFR BLD: 1.3 %
BILIRUB SERPL-MCNC: 0.7 MG/DL (ref 0–1)
BUN SERPL-MCNC: 17 MG/DL (ref 7–20)
CALCIUM SERPL-MCNC: 10.3 MG/DL (ref 8.3–10.6)
CHLORIDE SERPL-SCNC: 108 MMOL/L (ref 99–110)
CO2 BLDV-SCNC: 29 MMOL/L
CO2 SERPL-SCNC: 23 MMOL/L (ref 21–32)
COHGB MFR BLDV: 4.2 %
CREAT SERPL-MCNC: 1 MG/DL (ref 0.6–1.2)
DEPRECATED RDW RBC AUTO: 12.9 % (ref 12.4–15.4)
EOSINOPHIL # BLD: 0.2 K/UL (ref 0–0.6)
EOSINOPHIL NFR BLD: 1.9 %
GFR SERPLBLD CREATININE-BSD FMLA CKD-EPI: 64 ML/MIN/{1.73_M2}
GLUCOSE SERPL-MCNC: 91 MG/DL (ref 70–99)
HCO3 BLDV-SCNC: 27 MMOL/L (ref 23–29)
HCT VFR BLD AUTO: 45.5 % (ref 36–48)
HGB BLD-MCNC: 15.5 G/DL (ref 12–16)
LACTATE BLDV-SCNC: 0.9 MMOL/L (ref 0.4–1.9)
LYMPHOCYTES # BLD: 2.9 K/UL (ref 1–5.1)
LYMPHOCYTES NFR BLD: 31.4 %
MCH RBC QN AUTO: 30.1 PG (ref 26–34)
MCHC RBC AUTO-ENTMCNC: 34 G/DL (ref 31–36)
MCV RBC AUTO: 88.4 FL (ref 80–100)
METHGB MFR BLDV: 1.9 %
MONOCYTES # BLD: 0.4 K/UL (ref 0–1.3)
MONOCYTES NFR BLD: 4.3 %
NEUTROPHILS # BLD: 5.6 K/UL (ref 1.7–7.7)
NEUTROPHILS NFR BLD: 61.1 %
O2 THERAPY: NORMAL
PCO2 BLDV: 46.8 MMHG (ref 40–50)
PH BLDV: 7.37 [PH] (ref 7.35–7.45)
PLATELET # BLD AUTO: 259 K/UL (ref 135–450)
PLATELET BLD QL SMEAR: ADEQUATE
PMV BLD AUTO: 9.6 FL (ref 5–10.5)
PO2 BLDV: 44 MMHG
POTASSIUM SERPL-SCNC: 4.6 MMOL/L (ref 3.5–5.1)
PROT SERPL-MCNC: 8.4 G/DL (ref 6.4–8.2)
RBC # BLD AUTO: 5.15 M/UL (ref 4–5.2)
RBC MORPH BLD: NORMAL
SAO2 % BLDV: 74 %
SLIDE REVIEW: NORMAL
SODIUM SERPL-SCNC: 143 MMOL/L (ref 136–145)
TROPONIN, HIGH SENSITIVITY: 11 NG/L (ref 0–14)
WBC # BLD AUTO: 9.2 K/UL (ref 4–11)

## 2025-01-31 PROCEDURE — 72128 CT CHEST SPINE W/O DYE: CPT

## 2025-01-31 PROCEDURE — 70450 CT HEAD/BRAIN W/O DYE: CPT

## 2025-01-31 PROCEDURE — 36415 COLL VENOUS BLD VENIPUNCTURE: CPT

## 2025-01-31 PROCEDURE — 83605 ASSAY OF LACTIC ACID: CPT

## 2025-01-31 PROCEDURE — 85025 COMPLETE CBC W/AUTO DIFF WBC: CPT

## 2025-01-31 PROCEDURE — 84484 ASSAY OF TROPONIN QUANT: CPT

## 2025-01-31 PROCEDURE — 72131 CT LUMBAR SPINE W/O DYE: CPT

## 2025-01-31 PROCEDURE — 93005 ELECTROCARDIOGRAM TRACING: CPT | Performed by: EMERGENCY MEDICINE

## 2025-01-31 PROCEDURE — 80053 COMPREHEN METABOLIC PANEL: CPT

## 2025-01-31 PROCEDURE — 72125 CT NECK SPINE W/O DYE: CPT

## 2025-01-31 PROCEDURE — 82803 BLOOD GASES ANY COMBINATION: CPT

## 2025-01-31 PROCEDURE — 99285 EMERGENCY DEPT VISIT HI MDM: CPT

## 2025-01-31 PROCEDURE — 71046 X-RAY EXAM CHEST 2 VIEWS: CPT

## 2025-01-31 PROCEDURE — 87635 SARS-COV-2 COVID-19 AMP PRB: CPT

## 2025-01-31 RX ORDER — PROCHLORPERAZINE EDISYLATE 5 MG/ML
10 INJECTION INTRAMUSCULAR; INTRAVENOUS EVERY 6 HOURS PRN
Status: DISCONTINUED | OUTPATIENT
Start: 2025-01-31 | End: 2025-02-04 | Stop reason: HOSPADM

## 2025-01-31 RX ORDER — KETOROLAC TROMETHAMINE 15 MG/ML
15 INJECTION, SOLUTION INTRAMUSCULAR; INTRAVENOUS ONCE
Status: COMPLETED | OUTPATIENT
Start: 2025-01-31 | End: 2025-02-01

## 2025-01-31 RX ORDER — GABAPENTIN 300 MG/1
300 CAPSULE ORAL 3 TIMES DAILY
Qty: 90 CAPSULE | Refills: 5 | Status: ON HOLD | OUTPATIENT
Start: 2025-01-31 | End: 2025-07-30

## 2025-01-31 RX ORDER — DIPHENHYDRAMINE HYDROCHLORIDE 50 MG/ML
25 INJECTION INTRAMUSCULAR; INTRAVENOUS ONCE
Status: COMPLETED | OUTPATIENT
Start: 2025-01-31 | End: 2025-02-01

## 2025-01-31 RX ORDER — OXYBUTYNIN CHLORIDE 10 MG/1
10 TABLET, EXTENDED RELEASE ORAL DAILY
Qty: 30 TABLET | Refills: 3 | Status: ON HOLD | OUTPATIENT
Start: 2025-01-31

## 2025-01-31 RX ORDER — CLONIDINE HYDROCHLORIDE 0.3 MG/1
0.3 TABLET ORAL 2 TIMES DAILY
Qty: 60 TABLET | Refills: 11 | Status: ON HOLD | OUTPATIENT
Start: 2025-01-31

## 2025-01-31 RX ORDER — METOPROLOL SUCCINATE 50 MG/1
50 TABLET, EXTENDED RELEASE ORAL DAILY
Qty: 90 TABLET | Refills: 1 | Status: SHIPPED | OUTPATIENT
Start: 2025-01-31 | End: 2025-02-01 | Stop reason: SINTOL

## 2025-01-31 RX ORDER — 0.9 % SODIUM CHLORIDE 0.9 %
1000 INTRAVENOUS SOLUTION INTRAVENOUS ONCE
Status: COMPLETED | OUTPATIENT
Start: 2025-01-31 | End: 2025-02-01

## 2025-01-31 RX ORDER — METOPROLOL SUCCINATE 100 MG/1
100 TABLET, EXTENDED RELEASE ORAL DAILY
Qty: 90 TABLET | Refills: 3 | Status: ON HOLD | OUTPATIENT
Start: 2025-01-31

## 2025-01-31 ASSESSMENT — PAIN - FUNCTIONAL ASSESSMENT: PAIN_FUNCTIONAL_ASSESSMENT: 0-10

## 2025-01-31 ASSESSMENT — PAIN SCALES - GENERAL: PAINLEVEL_OUTOF10: 10

## 2025-01-31 ASSESSMENT — PAIN DESCRIPTION - LOCATION: LOCATION: HEAD

## 2025-01-31 ASSESSMENT — PAIN DESCRIPTION - PAIN TYPE: TYPE: ACUTE PAIN

## 2025-01-31 NOTE — TELEPHONE ENCOUNTER
Aleshia called stating that Shana is in need of refills of all her medications especially pain meds.

## 2025-01-31 NOTE — TELEPHONE ENCOUNTER
Future Appointments   Date Time Provider Department Center   2/12/2025  2:00 PM Canelo Galo MD [facility] BS ECC DEP       Last appt 1/3/25

## 2025-02-01 PROBLEM — R00.1 BRADYCARDIA: Status: ACTIVE | Noted: 2025-02-01

## 2025-02-01 PROBLEM — Z86.73 HISTORY OF CVA (CEREBROVASCULAR ACCIDENT): Status: ACTIVE | Noted: 2025-02-01

## 2025-02-01 PROBLEM — R53.1 LEFT-SIDED WEAKNESS: Status: ACTIVE | Noted: 2025-02-01

## 2025-02-01 PROBLEM — F41.1 GAD (GENERALIZED ANXIETY DISORDER): Status: ACTIVE | Noted: 2025-02-01

## 2025-02-01 PROBLEM — R42 DYSEQUILIBRIUM: Status: ACTIVE | Noted: 2025-02-01

## 2025-02-01 PROBLEM — E78.5 HYPERLIPIDEMIA: Status: ACTIVE | Noted: 2025-02-01

## 2025-02-01 PROBLEM — R91.1 PULMONARY NODULE: Status: ACTIVE | Noted: 2025-02-01

## 2025-02-01 PROBLEM — R29.6 RECURRENT FALLS: Status: ACTIVE | Noted: 2025-02-01

## 2025-02-01 PROBLEM — I10 BENIGN ESSENTIAL HTN: Status: ACTIVE | Noted: 2025-02-01

## 2025-02-01 PROBLEM — G62.9 PERIPHERAL POLYNEUROPATHY: Status: ACTIVE | Noted: 2025-02-01

## 2025-02-01 PROBLEM — N32.81 OAB (OVERACTIVE BLADDER): Status: ACTIVE | Noted: 2025-02-01

## 2025-02-01 PROBLEM — S32.019A CLOSED FRACTURE OF FIRST LUMBAR VERTEBRA (HCC): Status: ACTIVE | Noted: 2025-02-01

## 2025-02-01 PROBLEM — G43.919 INTRACTABLE MIGRAINE WITHOUT STATUS MIGRAINOSUS: Status: ACTIVE | Noted: 2025-02-01

## 2025-02-01 PROBLEM — G40.909 SEIZURE DISORDER (HCC): Status: ACTIVE | Noted: 2025-02-01

## 2025-02-01 PROBLEM — R55 SYNCOPE AND COLLAPSE: Status: ACTIVE | Noted: 2025-02-01

## 2025-02-01 LAB
ALBUMIN SERPL-MCNC: 4.1 G/DL (ref 3.4–5)
ALP SERPL-CCNC: 75 U/L (ref 40–129)
ALT SERPL-CCNC: 6 U/L (ref 10–40)
ANION GAP SERPL CALCULATED.3IONS-SCNC: 12 MMOL/L (ref 3–16)
AST SERPL-CCNC: 20 U/L (ref 15–37)
BASOPHILS # BLD: 0.1 K/UL (ref 0–0.2)
BASOPHILS NFR BLD: 0.9 %
BILIRUB DIRECT SERPL-MCNC: 0.3 MG/DL (ref 0–0.3)
BILIRUB INDIRECT SERPL-MCNC: 0.7 MG/DL (ref 0–1)
BILIRUB SERPL-MCNC: 1 MG/DL (ref 0–1)
BILIRUB UR QL STRIP.AUTO: NEGATIVE
BUN SERPL-MCNC: 16 MG/DL (ref 7–20)
CALCIUM SERPL-MCNC: 10 MG/DL (ref 8.3–10.6)
CHLORIDE SERPL-SCNC: 108 MMOL/L (ref 99–110)
CLARITY UR: CLEAR
CO2 SERPL-SCNC: 22 MMOL/L (ref 21–32)
COLOR UR: YELLOW
CREAT SERPL-MCNC: 1 MG/DL (ref 0.6–1.2)
CRP SERPL-MCNC: <3 MG/L (ref 0–5.1)
DEPRECATED RDW RBC AUTO: 13 % (ref 12.4–15.4)
EKG ATRIAL RATE: 51 BPM
EKG DIAGNOSIS: NORMAL
EKG P AXIS: -4 DEGREES
EKG P-R INTERVAL: 148 MS
EKG Q-T INTERVAL: 454 MS
EKG QRS DURATION: 114 MS
EKG QTC CALCULATION (BAZETT): 418 MS
EKG R AXIS: -23 DEGREES
EKG T AXIS: 73 DEGREES
EKG VENTRICULAR RATE: 51 BPM
EOSINOPHIL # BLD: 0.1 K/UL (ref 0–0.6)
EOSINOPHIL NFR BLD: 1.4 %
ERYTHROCYTE [SEDIMENTATION RATE] IN BLOOD BY WESTERGREN METHOD: 24 MM/HR (ref 0–30)
FERRITIN SERPL IA-MCNC: 144 NG/ML (ref 15–150)
FLUAV + FLUBV AG NOSE IA.RAPID: NOT DETECTED
FLUAV + FLUBV AG NOSE IA.RAPID: NOT DETECTED
FOLATE SERPL-MCNC: 8.78 NG/ML (ref 4.78–24.2)
GFR SERPLBLD CREATININE-BSD FMLA CKD-EPI: 64 ML/MIN/{1.73_M2}
GLUCOSE SERPL-MCNC: 87 MG/DL (ref 70–99)
GLUCOSE UR STRIP.AUTO-MCNC: NEGATIVE MG/DL
HCT VFR BLD AUTO: 44.7 % (ref 36–48)
HGB BLD-MCNC: 15.2 G/DL (ref 12–16)
HGB UR QL STRIP.AUTO: NEGATIVE
IRON SATN MFR SERPL: 39 % (ref 15–50)
IRON SERPL-MCNC: 128 UG/DL (ref 37–145)
KETONES UR STRIP.AUTO-MCNC: NEGATIVE MG/DL
LEUKOCYTE ESTERASE UR QL STRIP.AUTO: NEGATIVE
LEVETIRACETAM SERPL-MCNC: 6.6 UG/ML (ref 6–46)
LYMPHOCYTES # BLD: 1.9 K/UL (ref 1–5.1)
LYMPHOCYTES NFR BLD: 17.7 %
MAGNESIUM SERPL-MCNC: 2.28 MG/DL (ref 1.8–2.4)
MCH RBC QN AUTO: 30 PG (ref 26–34)
MCHC RBC AUTO-ENTMCNC: 34.1 G/DL (ref 31–36)
MCV RBC AUTO: 87.8 FL (ref 80–100)
MEDICATION DOSE-MCNC: NORMAL
MONOCYTES # BLD: 0.5 K/UL (ref 0–1.3)
MONOCYTES NFR BLD: 4.6 %
NEUTROPHILS # BLD: 7.9 K/UL (ref 1.7–7.7)
NEUTROPHILS NFR BLD: 75.4 %
NITRITE UR QL STRIP.AUTO: NEGATIVE
PH UR STRIP.AUTO: 6 [PH] (ref 5–8)
PHOSPHATE SERPL-MCNC: 2.7 MG/DL (ref 2.5–4.9)
PLATELET # BLD AUTO: 255 K/UL (ref 135–450)
PMV BLD AUTO: 9 FL (ref 5–10.5)
POTASSIUM SERPL-SCNC: 3.6 MMOL/L (ref 3.5–5.1)
PROT SERPL-MCNC: 7.7 G/DL (ref 6.4–8.2)
PROT UR STRIP.AUTO-MCNC: NEGATIVE MG/DL
RBC # BLD AUTO: 5.09 M/UL (ref 4–5.2)
SARS-COV-2 RDRP RESP QL NAA+PROBE: NOT DETECTED
SODIUM SERPL-SCNC: 142 MMOL/L (ref 136–145)
SP GR UR STRIP.AUTO: 1.01 (ref 1–1.03)
TIBC SERPL-MCNC: 325 UG/DL (ref 260–445)
TSH SERPL DL<=0.005 MIU/L-ACNC: 0.55 UIU/ML (ref 0.27–4.2)
UA COMPLETE W REFLEX CULTURE PNL UR: NORMAL
UA DIPSTICK W REFLEX MICRO PNL UR: NORMAL
URN SPEC COLLECT METH UR: NORMAL
UROBILINOGEN UR STRIP-ACNC: 1 E.U./DL
VIT B12 SERPL-MCNC: 606 PG/ML (ref 211–911)
WBC # BLD AUTO: 10.5 K/UL (ref 4–11)

## 2025-02-01 PROCEDURE — 6360000002 HC RX W HCPCS: Performed by: PHYSICIAN ASSISTANT

## 2025-02-01 PROCEDURE — 85652 RBC SED RATE AUTOMATED: CPT

## 2025-02-01 PROCEDURE — 6370000000 HC RX 637 (ALT 250 FOR IP): Performed by: INTERNAL MEDICINE

## 2025-02-01 PROCEDURE — 6360000002 HC RX W HCPCS: Performed by: INTERNAL MEDICINE

## 2025-02-01 PROCEDURE — 86140 C-REACTIVE PROTEIN: CPT

## 2025-02-01 PROCEDURE — 2000000000 HC ICU R&B

## 2025-02-01 PROCEDURE — 84207 ASSAY OF VITAMIN B-6: CPT

## 2025-02-01 PROCEDURE — 84425 ASSAY OF VITAMIN B-1: CPT

## 2025-02-01 PROCEDURE — 84165 PROTEIN E-PHORESIS SERUM: CPT

## 2025-02-01 PROCEDURE — 84155 ASSAY OF PROTEIN SERUM: CPT

## 2025-02-01 PROCEDURE — 80177 DRUG SCRN QUAN LEVETIRACETAM: CPT

## 2025-02-01 PROCEDURE — 85025 COMPLETE CBC W/AUTO DIFF WBC: CPT

## 2025-02-01 PROCEDURE — 93010 ELECTROCARDIOGRAM REPORT: CPT | Performed by: INTERNAL MEDICINE

## 2025-02-01 PROCEDURE — 99223 1ST HOSP IP/OBS HIGH 75: CPT | Performed by: INTERNAL MEDICINE

## 2025-02-01 PROCEDURE — 87502 INFLUENZA DNA AMP PROBE: CPT

## 2025-02-01 PROCEDURE — 82607 VITAMIN B-12: CPT

## 2025-02-01 PROCEDURE — 2500000003 HC RX 250 WO HCPCS: Performed by: INTERNAL MEDICINE

## 2025-02-01 PROCEDURE — 82746 ASSAY OF FOLIC ACID SERUM: CPT

## 2025-02-01 PROCEDURE — 83540 ASSAY OF IRON: CPT

## 2025-02-01 PROCEDURE — 80076 HEPATIC FUNCTION PANEL: CPT

## 2025-02-01 PROCEDURE — 80048 BASIC METABOLIC PNL TOTAL CA: CPT

## 2025-02-01 PROCEDURE — 0064U ANTB TP TOTAL&RPR IA QUAL: CPT

## 2025-02-01 PROCEDURE — 83735 ASSAY OF MAGNESIUM: CPT

## 2025-02-01 PROCEDURE — 96374 THER/PROPH/DIAG INJ IV PUSH: CPT

## 2025-02-01 PROCEDURE — 36415 COLL VENOUS BLD VENIPUNCTURE: CPT

## 2025-02-01 PROCEDURE — 93005 ELECTROCARDIOGRAM TRACING: CPT | Performed by: STUDENT IN AN ORGANIZED HEALTH CARE EDUCATION/TRAINING PROGRAM

## 2025-02-01 PROCEDURE — 83521 IG LIGHT CHAINS FREE EACH: CPT

## 2025-02-01 PROCEDURE — 96375 TX/PRO/DX INJ NEW DRUG ADDON: CPT

## 2025-02-01 PROCEDURE — 84443 ASSAY THYROID STIM HORMONE: CPT

## 2025-02-01 PROCEDURE — 83550 IRON BINDING TEST: CPT

## 2025-02-01 PROCEDURE — 2580000003 HC RX 258: Performed by: PHYSICIAN ASSISTANT

## 2025-02-01 PROCEDURE — 84100 ASSAY OF PHOSPHORUS: CPT

## 2025-02-01 PROCEDURE — 87040 BLOOD CULTURE FOR BACTERIA: CPT

## 2025-02-01 PROCEDURE — 81003 URINALYSIS AUTO W/O SCOPE: CPT

## 2025-02-01 PROCEDURE — 82728 ASSAY OF FERRITIN: CPT

## 2025-02-01 RX ORDER — ONDANSETRON 4 MG/1
4 TABLET, ORALLY DISINTEGRATING ORAL EVERY 8 HOURS PRN
Status: DISCONTINUED | OUTPATIENT
Start: 2025-02-01 | End: 2025-02-04 | Stop reason: HOSPADM

## 2025-02-01 RX ORDER — POLYETHYLENE GLYCOL 3350 17 G/17G
17 POWDER, FOR SOLUTION ORAL DAILY PRN
Status: DISCONTINUED | OUTPATIENT
Start: 2025-02-01 | End: 2025-02-04 | Stop reason: HOSPADM

## 2025-02-01 RX ORDER — METOPROLOL SUCCINATE 50 MG/1
100 TABLET, EXTENDED RELEASE ORAL DAILY
Status: DISCONTINUED | OUTPATIENT
Start: 2025-02-01 | End: 2025-02-02

## 2025-02-01 RX ORDER — SODIUM CHLORIDE 0.9 % (FLUSH) 0.9 %
5-40 SYRINGE (ML) INJECTION PRN
Status: DISCONTINUED | OUTPATIENT
Start: 2025-02-01 | End: 2025-02-04 | Stop reason: HOSPADM

## 2025-02-01 RX ORDER — SODIUM CHLORIDE 9 MG/ML
INJECTION, SOLUTION INTRAVENOUS PRN
Status: DISCONTINUED | OUTPATIENT
Start: 2025-02-01 | End: 2025-02-04 | Stop reason: HOSPADM

## 2025-02-01 RX ORDER — SODIUM CHLORIDE 0.9 % (FLUSH) 0.9 %
5-40 SYRINGE (ML) INJECTION EVERY 12 HOURS SCHEDULED
Status: DISCONTINUED | OUTPATIENT
Start: 2025-02-01 | End: 2025-02-04 | Stop reason: HOSPADM

## 2025-02-01 RX ORDER — SUMATRIPTAN SUCCINATE 25 MG/1
100 TABLET ORAL DAILY PRN
Status: DISCONTINUED | OUTPATIENT
Start: 2025-02-01 | End: 2025-02-04 | Stop reason: HOSPADM

## 2025-02-01 RX ORDER — ACETAMINOPHEN 500 MG
1000 TABLET ORAL EVERY 8 HOURS PRN
Status: DISCONTINUED | OUTPATIENT
Start: 2025-02-01 | End: 2025-02-04 | Stop reason: HOSPADM

## 2025-02-01 RX ORDER — ATORVASTATIN CALCIUM 20 MG/1
20 TABLET, FILM COATED ORAL DAILY
Status: DISCONTINUED | OUTPATIENT
Start: 2025-02-01 | End: 2025-02-04 | Stop reason: HOSPADM

## 2025-02-01 RX ORDER — OXYBUTYNIN CHLORIDE 10 MG/1
10 TABLET, EXTENDED RELEASE ORAL DAILY
Status: DISCONTINUED | OUTPATIENT
Start: 2025-02-01 | End: 2025-02-04 | Stop reason: HOSPADM

## 2025-02-01 RX ORDER — ONDANSETRON 2 MG/ML
4 INJECTION INTRAMUSCULAR; INTRAVENOUS EVERY 6 HOURS PRN
Status: DISCONTINUED | OUTPATIENT
Start: 2025-02-01 | End: 2025-02-04 | Stop reason: HOSPADM

## 2025-02-01 RX ORDER — DIAZEPAM 5 MG/1
10 TABLET ORAL EVERY 8 HOURS PRN
Status: DISCONTINUED | OUTPATIENT
Start: 2025-02-01 | End: 2025-02-04 | Stop reason: HOSPADM

## 2025-02-01 RX ORDER — LEVETIRACETAM 500 MG/1
500 TABLET ORAL EVERY 12 HOURS SCHEDULED
Status: DISCONTINUED | OUTPATIENT
Start: 2025-02-01 | End: 2025-02-04 | Stop reason: HOSPADM

## 2025-02-01 RX ORDER — METHOCARBAMOL 500 MG/1
500 TABLET, FILM COATED ORAL 4 TIMES DAILY PRN
Status: DISCONTINUED | OUTPATIENT
Start: 2025-02-01 | End: 2025-02-04 | Stop reason: HOSPADM

## 2025-02-01 RX ORDER — IBUPROFEN 400 MG/1
400 TABLET, FILM COATED ORAL 3 TIMES DAILY PRN
Status: DISCONTINUED | OUTPATIENT
Start: 2025-02-01 | End: 2025-02-04 | Stop reason: HOSPADM

## 2025-02-01 RX ORDER — GABAPENTIN 300 MG/1
300 CAPSULE ORAL 3 TIMES DAILY
Status: DISCONTINUED | OUTPATIENT
Start: 2025-02-01 | End: 2025-02-04 | Stop reason: HOSPADM

## 2025-02-01 RX ORDER — ALBUTEROL SULFATE 90 UG/1
2 INHALANT RESPIRATORY (INHALATION) EVERY 6 HOURS PRN
Status: DISCONTINUED | OUTPATIENT
Start: 2025-02-01 | End: 2025-02-04 | Stop reason: HOSPADM

## 2025-02-01 RX ORDER — MELOXICAM 7.5 MG/1
7.5 TABLET ORAL DAILY PRN
Status: DISCONTINUED | OUTPATIENT
Start: 2025-02-01 | End: 2025-02-01

## 2025-02-01 RX ORDER — ENOXAPARIN SODIUM 100 MG/ML
40 INJECTION SUBCUTANEOUS DAILY
Status: DISCONTINUED | OUTPATIENT
Start: 2025-02-01 | End: 2025-02-04 | Stop reason: HOSPADM

## 2025-02-01 RX ORDER — CLONIDINE HYDROCHLORIDE 0.1 MG/1
0.3 TABLET ORAL 2 TIMES DAILY
Status: DISCONTINUED | OUTPATIENT
Start: 2025-02-01 | End: 2025-02-01

## 2025-02-01 RX ADMIN — OXYCODONE HYDROCHLORIDE 15 MG: 10 TABLET ORAL at 15:28

## 2025-02-01 RX ADMIN — DIAZEPAM 10 MG: 5 TABLET ORAL at 16:41

## 2025-02-01 RX ADMIN — ONDANSETRON 4 MG: 2 INJECTION, SOLUTION INTRAMUSCULAR; INTRAVENOUS at 11:45

## 2025-02-01 RX ADMIN — ATORVASTATIN CALCIUM 20 MG: 20 TABLET, FILM COATED ORAL at 10:53

## 2025-02-01 RX ADMIN — ONDANSETRON 4 MG: 2 INJECTION, SOLUTION INTRAMUSCULAR; INTRAVENOUS at 20:29

## 2025-02-01 RX ADMIN — LEVETIRACETAM 500 MG: 500 TABLET, FILM COATED ORAL at 20:42

## 2025-02-01 RX ADMIN — SODIUM CHLORIDE, PRESERVATIVE FREE 10 ML: 5 INJECTION INTRAVENOUS at 20:44

## 2025-02-01 RX ADMIN — ENOXAPARIN SODIUM 40 MG: 100 INJECTION SUBCUTANEOUS at 10:49

## 2025-02-01 RX ADMIN — OXYBUTYNIN CHLORIDE 10 MG: 10 TABLET, EXTENDED RELEASE ORAL at 10:49

## 2025-02-01 RX ADMIN — GABAPENTIN 300 MG: 300 CAPSULE ORAL at 10:49

## 2025-02-01 RX ADMIN — GABAPENTIN 300 MG: 300 CAPSULE ORAL at 20:42

## 2025-02-01 RX ADMIN — SODIUM CHLORIDE, PRESERVATIVE FREE 10 ML: 5 INJECTION INTRAVENOUS at 10:53

## 2025-02-01 RX ADMIN — SUMATRIPTAN SUCCINATE 100 MG: 50 TABLET ORAL at 21:34

## 2025-02-01 RX ADMIN — PROCHLORPERAZINE EDISYLATE 10 MG: 5 INJECTION INTRAMUSCULAR; INTRAVENOUS at 13:24

## 2025-02-01 RX ADMIN — CLONIDINE HYDROCHLORIDE 0.3 MG: 0.1 TABLET ORAL at 10:49

## 2025-02-01 RX ADMIN — KETOROLAC TROMETHAMINE 15 MG: 15 INJECTION, SOLUTION INTRAMUSCULAR; INTRAVENOUS at 00:30

## 2025-02-01 RX ADMIN — LEVETIRACETAM 500 MG: 500 TABLET, FILM COATED ORAL at 10:49

## 2025-02-01 RX ADMIN — PROCHLORPERAZINE EDISYLATE 10 MG: 5 INJECTION INTRAMUSCULAR; INTRAVENOUS at 22:48

## 2025-02-01 RX ADMIN — PROCHLORPERAZINE EDISYLATE 10 MG: 5 INJECTION INTRAMUSCULAR; INTRAVENOUS at 00:32

## 2025-02-01 RX ADMIN — SODIUM CHLORIDE 1000 ML: 9 INJECTION, SOLUTION INTRAVENOUS at 00:29

## 2025-02-01 RX ADMIN — DIPHENHYDRAMINE HYDROCHLORIDE 25 MG: 50 INJECTION INTRAMUSCULAR; INTRAVENOUS at 00:31

## 2025-02-01 ASSESSMENT — PAIN DESCRIPTION - ORIENTATION
ORIENTATION: POSTERIOR;MID;LOWER
ORIENTATION: MID;POSTERIOR
ORIENTATION: POSTERIOR;MID;LOWER
ORIENTATION: POSTERIOR;MID;LOWER

## 2025-02-01 ASSESSMENT — PAIN DESCRIPTION - PAIN TYPE
TYPE: CHRONIC PAIN

## 2025-02-01 ASSESSMENT — PAIN DESCRIPTION - DESCRIPTORS
DESCRIPTORS: ACHING;THROBBING
DESCRIPTORS: ACHING;THROBBING
DESCRIPTORS: ACHING
DESCRIPTORS: ACHING;THROBBING

## 2025-02-01 ASSESSMENT — PAIN DESCRIPTION - LOCATION
LOCATION: HEAD;NECK;BACK
LOCATION: HEAD
LOCATION: HEAD;NECK;BACK
LOCATION: HEAD;NECK;BACK
LOCATION: HEAD

## 2025-02-01 ASSESSMENT — PAIN SCALES - GENERAL
PAINLEVEL_OUTOF10: 7
PAINLEVEL_OUTOF10: 8
PAINLEVEL_OUTOF10: 8
PAINLEVEL_OUTOF10: 6
PAINLEVEL_OUTOF10: 5
PAINLEVEL_OUTOF10: 10

## 2025-02-01 ASSESSMENT — PAIN DESCRIPTION - FREQUENCY
FREQUENCY: CONTINUOUS

## 2025-02-01 ASSESSMENT — PAIN - FUNCTIONAL ASSESSMENT
PAIN_FUNCTIONAL_ASSESSMENT: PREVENTS OR INTERFERES WITH MANY ACTIVE NOT PASSIVE ACTIVITIES

## 2025-02-01 ASSESSMENT — PAIN DESCRIPTION - ONSET
ONSET: ON-GOING

## 2025-02-01 NOTE — H&P
H&P  [facility] INTERNAL MEDICINE ADMISSION H&P    Patient: SHANA GARCIA  :  1965  PCP: Canelo Galo MD    Reason for visit: recurrent falls    SUBJECTIVE     History of present illness:  Shana Garcia is a 59 y.o. female with a history of BEATRICE, osteoarthritis, asthma, HTN, peripheral neuropathy, HLD, overactive bladder, migraines, hx of CVA w/L sided residual weakness, and sz DO who presents to the hospital for recurrent falls. Pt started having a severe migraine yesterday AM. This is not abnormal for the pt. She has a hx of these. Was causing her decreased appetite and nausea. She took two doses of her maxalt without relief. Started having emesis. Then she started have acute worsening of her chronic back and neck pain and decided she needed to go to ER. Pt did have a fall yesterday as well. However, this has been a chronic ongoing issue for her w/o clear etiology. She has been working with her PCP on this. Her fall yesterday day she did hit the L side of her face. She does not describe this fall as any different then her usual falls. She feels like she just loses her balance and falls. Denies any prodromal symptoms. Denies any chest pain, SOB, or palpitations. She denies any LOC or sz like activity.    ED course: vitals remarkable for bradycardia as low as the 30s. Other vitals stable. Labs unremarkable. Blood cx pends. CXR showed 15mm RL base opacity that needs f/u CT chest. CTH, c-spine, and t-spine unremarkable. CT L-spine showed acute R transverse process fx L1. EKG showed sinus jeffy. Pt was given benadryl, toradol, compazine, and 1L NS.    Review of systems: as above    Past Medical History:   Diagnosis Date    Anxiety     Arthritis     Asthma     Hypertension     Neuropathy      Past Surgical History:   Procedure Laterality Date     SECTION      SHOULDER ARTHROSCOPY Right 10/28/15    labral debridement open shanna & decompression    TONSILLECTOMY       Allergies   Allergen Reactions

## 2025-02-01 NOTE — PLAN OF CARE
Problem: Safety - Adult  Goal: Free from fall injury  Outcome: Progressing  Flowsheets (Taken 2/1/2025 1354)  Free From Fall Injury: Instruct family/caregiver on patient safety     Problem: Discharge Planning  Goal: Discharge to home or other facility with appropriate resources  Outcome: Progressing  Flowsheets (Taken 2/1/2025 1354)  Discharge to home or other facility with appropriate resources:   Identify barriers to discharge with patient and caregiver   Identify discharge learning needs (meds, wound care, etc)   Refer to discharge planning if patient needs post-hospital services based on physician order or complex needs related to functional status, cognitive ability or social support system   Arrange for needed discharge resources and transportation as appropriate     Problem: Pain  Goal: Verbalizes/displays adequate comfort level or baseline comfort level  Outcome: Progressing  Flowsheets (Taken 2/1/2025 1354)  Verbalizes/displays adequate comfort level or baseline comfort level:   Encourage patient to monitor pain and request assistance   Administer analgesics based on type and severity of pain and evaluate response   Assess pain using appropriate pain scale   Implement non-pharmacological measures as appropriate and evaluate response     Problem: Skin/Tissue Integrity  Goal: Skin integrity remains intact  Description: 1.  Monitor for areas of redness and/or skin breakdown  2.  Assess vascular access sites hourly  3.  Every 4-6 hours minimum:  Change oxygen saturation probe site  4.  Every 4-6 hours:  If on nasal continuous positive airway pressure, respiratory therapy assess nares and determine need for appliance change or resting period  Outcome: Progressing  Flowsheets (Taken 2/1/2025 1354)  Skin Integrity Remains Intact: Monitor for areas of redness and/or skin breakdown     Problem: Skin/Tissue Integrity - Adult  Goal: Skin integrity remains intact  Description: 1.  Monitor for areas of redness and/or

## 2025-02-01 NOTE — ED NOTES
Handoff given to Coty. All questions answered at this time.   
Pt has been bradycardic throughout the evening, pt was placed on defib pads shortly after care was taken over by this RN. Pt has been asymptomatic. Providers have been aware no new orders.   
RN called report to jerome CHAVEZ for the continuum of care. sBar discussed and all questions answered   
Rn called report to Jeffrey rahman for the continuum of care. Sbar discussed and all questions answered   
No

## 2025-02-01 NOTE — CONSULTS
REASON FOR CONSULTATION/CC: Bradycardia      Consult at request of Canelo Galo MD for     PCP: Canelo Galo MD  Established Pulmonologist:  None    HISTORY OF PRESENT ILLNESS: Shana Martel is a 59 y.o. year old female with a history of  who presents with     Rapid response was called secondary to bradycardia.  Transferred to ICU.  Currently being seen by cardiology.  Patient was initially admitted for recurrent falls with this her equilibrium.    This note may have been  transcribed using Dragon Dictation software. Please disregard any translational errors.      Assessment:         Plan:      Hospital Day 0     Bradycardia  Agree with Cardiology  Start by holding medications      Nausea   Zofran       Migraines  Complicating blood pressure treatment.       Recurrent falls  Dysequilibrium  L1 transverse process fracture  Monitor   Per primary care           This note was transcribed using Dragon Dictation software. Please disregard any translational errors.    Thank you for the consult    ROSS LUCAS   [facility]  [phone]             Data:     PAST MEDICAL HISTORY:  Past Medical History:   Diagnosis Date    Anxiety     Arthritis     Asthma     Hypertension     Neuropathy        PAST SURGICAL HISTORY:  Past Surgical History:   Procedure Laterality Date     SECTION      SHOULDER ARTHROSCOPY Right 10/28/15    labral debridement open shanna & decompression    TONSILLECTOMY         FAMILY HISTORY:  family history includes Cancer in her mother; Diabetes in her mother; Heart Disease in her father; Hypertension in her father; Seizures in her brother.    SOCIAL HISTORY:   reports that she has never smoked. She has never used smokeless tobacco.    Scheduled Meds:   sodium chloride flush  5-40 mL IntraVENous 2 times per day    enoxaparin  40 mg SubCUTAneous Daily    atorvastatin  20 mg Oral Daily    cloNIDine  0.3 mg Oral BID    gabapentin  300 mg Oral TID

## 2025-02-01 NOTE — ED PROVIDER NOTES
Attending Supervising Physician’s Attestation Statement  I was present with the Mid Level Provider during the history and exam. I discussed the findings and plans with the Mid Level Provider and agree as documented in her note     Vitals:    01/31/25 1807 01/31/25 1950 01/31/25 2042   BP: (!) 149/93     Pulse: 70 (!) 42 (!) 38   Resp: 17     Temp: 98 °F (36.7 °C)     SpO2: 100%       Impression  Symptomatic bradycardia  Lumbar fracture    Total Critical Care time was 38 minutes, excluding separately reportable procedures.  There was a high probability of clinically significant/life threatening deterioration in the patient's condition which required my urgent intervention.      Electronically signed by Edison Donnelly MD on 1/31/25 at 11:19 PM Edison Aguilar MD  01/31/25 2260    
OF SYSTEMS :      Review of Systems    Positives and Pertinent negatives as per HPI.     SURGICAL HISTORY     Past Surgical History:   Procedure Laterality Date     SECTION      SHOULDER ARTHROSCOPY Right 10/28/15    labral debridement open shanna & decompression    TONSILLECTOMY         CURRENTMEDICATIONS       Previous Medications    ALBUTEROL SULFATE HFA (PROVENTIL;VENTOLIN;PROAIR) 108 (90 BASE) MCG/ACT INHALER    Inhale 2 puffs into the lungs 2 times daily    ATORVASTATIN (LIPITOR) 20 MG TABLET    Take 1 tablet by mouth daily    CLONIDINE (CATAPRES) 0.3 MG TABLET    Take 1 tablet by mouth 2 times daily    DIAZEPAM (VALIUM) 10 MG TABLET    Take 1 tablet by mouth every 8 hours as needed for Anxiety for up to 30 days. for anxiety for up to 30 days Max Daily Amount: 30 mg    DICLOFENAC (VOLTAREN) 50 MG EC TABLET    Take 1 tablet by mouth 3 times daily (with meals)    FERROUS SULFATE (IRON 325) 325 (65 FE) MG TABLET    Take 1 tablet by mouth 2 times daily (with meals)    GABAPENTIN (NEURONTIN) 300 MG CAPSULE    Take 1 capsule by mouth 3 times daily for 180 days. Intended supply: 30 days    LEVETIRACETAM (KEPPRA) 500 MG TABLET    Take 1 tablet by mouth every 12 hours    METHOCARBAMOL (ROBAXIN) 500 MG TABLET    Take 1 tablet by mouth 4 times daily as needed (spasms)    METOPROLOL SUCCINATE (TOPROL XL) 100 MG EXTENDED RELEASE TABLET    Take 1 tablet by mouth daily Total dose = 150 mg (100 mg + 50 mg tablet)    MULTIPLE VITAMINS-MINERALS (THERAPEUTIC MULTIVITAMIN-MINERALS) TABLET    Take 1 tablet by mouth daily    NALOXONE (NARCAN) 4 MG/0.1ML LIQD NASAL SPRAY    1 spray by Nasal route as needed for Opioid Reversal    OXYBUTYNIN (DITROPAN XL) 10 MG EXTENDED RELEASE TABLET    Take 1 tablet by mouth daily    OXYCODONE (OXY-IR) 15 MG IMMEDIATE RELEASE TABLET    Take 1 tablet by mouth every 6 hours as needed for Pain for up to 30 days. Max Daily Amount: 60 mg    RIZATRIPTAN (MAXALT) 10 MG TABLET    Take 1 tablet by

## 2025-02-01 NOTE — CONSULTS
[facility]  Cardiology Consult Note        CC:      Asymptomatic bradycardia             HPI:   This is a 59 y.o. female came to the ER for recurrent falls.  According to the primary care physician's note she has been having severe migraines yesterday which is l not unusual for the patient.  Also decreased appetite and nausea.  He had to come to the ER because of worsening of her chronic back and neck pain    We are asked to see her for bradycardia.  Heart rate in the ER was 37      Patient on clonidine 0.3 mg twice daily, Toprol-XL on a 50 mg    Past Medical History:   Diagnosis Date    Anxiety     Arthritis     Asthma     Hypertension     Neuropathy       Past Surgical History:   Procedure Laterality Date     SECTION      SHOULDER ARTHROSCOPY Right 10/28/15    labral debridement open shanna & decompression    TONSILLECTOMY        Family History   Problem Relation Age of Onset    Cancer Mother     Diabetes Mother     Heart Disease Father     Hypertension Father     Seizures Brother       Social History     Tobacco Use    Smoking status: Never    Smokeless tobacco: Never   Vaping Use    Vaping status: Never Used   Substance Use Topics    Alcohol use: No     Alcohol/week: 0.0 standard drinks of alcohol    Drug use: No      Allergies   Allergen Reactions    Hydrocodone Nausea And Vomiting and Seizure     seizures    Paroxetine Hcl Seizure     Seizures    Aspirin Nausea And Vomiting    Penicillins Nausea And Vomiting    Vicodin [Hydrocodone-Acetaminophen] Nausea Only      sodium chloride flush  5-40 mL IntraVENous 2 times per day    enoxaparin  40 mg SubCUTAneous Daily    atorvastatin  20 mg Oral Daily    cloNIDine  0.3 mg Oral BID    gabapentin  300 mg Oral TID    levETIRAcetam  500 mg Oral 2 times per day    [Held by provider] metoprolol succinate  100 mg Oral Daily    oxyBUTYnin  10 mg Oral Daily       Review of Systems -   Constitutional: Negative for weight gain/loss; malaise,

## 2025-02-02 ENCOUNTER — APPOINTMENT (OUTPATIENT)
Dept: CT IMAGING | Age: 60
DRG: 309 | End: 2025-02-02
Payer: MEDICARE

## 2025-02-02 PROBLEM — I48.92 ATRIAL FLUTTER WITH RAPID VENTRICULAR RESPONSE (HCC): Status: ACTIVE | Noted: 2025-02-02

## 2025-02-02 PROBLEM — K62.89 PROCTITIS: Status: ACTIVE | Noted: 2025-02-02

## 2025-02-02 PROBLEM — A41.9 SEPSIS WITHOUT ACUTE ORGAN DYSFUNCTION (HCC): Status: ACTIVE | Noted: 2025-02-02

## 2025-02-02 PROBLEM — I48.91 ATRIAL FIBRILLATION WITH RAPID VENTRICULAR RESPONSE (HCC): Status: ACTIVE | Noted: 2025-02-02

## 2025-02-02 PROBLEM — E87.20 LACTIC ACIDOSIS: Status: ACTIVE | Noted: 2025-02-02

## 2025-02-02 LAB
ALBUMIN SERPL-MCNC: 4.3 G/DL (ref 3.4–5)
ALP SERPL-CCNC: 79 U/L (ref 40–129)
ALT SERPL-CCNC: 11 U/L (ref 10–40)
ANION GAP SERPL CALCULATED.3IONS-SCNC: 21 MMOL/L (ref 3–16)
AST SERPL-CCNC: 28 U/L (ref 15–37)
BASOPHILS # BLD: 0.1 K/UL (ref 0–0.2)
BASOPHILS NFR BLD: 0.3 %
BETA-HYDROXYBUTYRATE: 0.28 MMOL/L (ref 0–0.27)
BILIRUB DIRECT SERPL-MCNC: 0.2 MG/DL (ref 0–0.3)
BILIRUB INDIRECT SERPL-MCNC: 0.6 MG/DL (ref 0–1)
BILIRUB SERPL-MCNC: 0.8 MG/DL (ref 0–1)
BUN SERPL-MCNC: 16 MG/DL (ref 7–20)
C DIFF TOX A+B STL QL IA: NORMAL
CALCIUM SERPL-MCNC: 10.5 MG/DL (ref 8.3–10.6)
CHLORIDE SERPL-SCNC: 101 MMOL/L (ref 99–110)
CO2 SERPL-SCNC: 16 MMOL/L (ref 21–32)
CREAT SERPL-MCNC: 0.9 MG/DL (ref 0.6–1.2)
CRYPTOSP AG STL QL IA: NORMAL
DEPRECATED RDW RBC AUTO: 13.1 % (ref 12.4–15.4)
E HISTOLYT AG STL QL IA: NORMAL
EKG ATRIAL RATE: 45 BPM
EKG DIAGNOSIS: NORMAL
EKG P AXIS: 54 DEGREES
EKG P-R INTERVAL: 188 MS
EKG Q-T INTERVAL: 422 MS
EKG QRS DURATION: 116 MS
EKG QTC CALCULATION (BAZETT): 339 MS
EKG R AXIS: -53 DEGREES
EKG T AXIS: 32 DEGREES
EKG VENTRICULAR RATE: 39 BPM
EOSINOPHIL # BLD: 0 K/UL (ref 0–0.6)
EOSINOPHIL NFR BLD: 0 %
G LAMBLIA AG STL QL IA: NORMAL
GFR SERPLBLD CREATININE-BSD FMLA CKD-EPI: 73 ML/MIN/{1.73_M2}
GLUCOSE SERPL-MCNC: 159 MG/DL (ref 70–99)
HCT VFR BLD AUTO: 49.6 % (ref 36–48)
HGB BLD-MCNC: 17 G/DL (ref 12–16)
LACTATE BLDV-SCNC: 1.3 MMOL/L (ref 0.4–2)
LACTATE BLDV-SCNC: 1.4 MMOL/L (ref 0.4–2)
LACTATE BLDV-SCNC: 2.9 MMOL/L (ref 0.4–2)
LACTATE BLDV-SCNC: 3.3 MMOL/L (ref 0.4–2)
LACTOFERRIN STL QL IA: ABNORMAL
LYMPHOCYTES # BLD: 1.5 K/UL (ref 1–5.1)
LYMPHOCYTES NFR BLD: 7.1 %
MCH RBC QN AUTO: 29.8 PG (ref 26–34)
MCHC RBC AUTO-ENTMCNC: 34.4 G/DL (ref 31–36)
MCV RBC AUTO: 86.8 FL (ref 80–100)
MONOCYTES # BLD: 1.2 K/UL (ref 0–1.3)
MONOCYTES NFR BLD: 5.9 %
NEUTROPHILS # BLD: 18.4 K/UL (ref 1.7–7.7)
NEUTROPHILS NFR BLD: 86.7 %
PLATELET # BLD AUTO: 248 K/UL (ref 135–450)
PMV BLD AUTO: 9.5 FL (ref 5–10.5)
POTASSIUM SERPL-SCNC: 4 MMOL/L (ref 3.5–5.1)
PROT SERPL-MCNC: 8.3 G/DL (ref 6.4–8.2)
RBC # BLD AUTO: 5.71 M/UL (ref 4–5.2)
REAGIN+T PALLIDUM IGG+IGM SERPL-IMP: REACTIVE
REASON FOR REJECTION: NORMAL
REJECTED TEST: NORMAL
RPR SER QL: NORMAL
SODIUM SERPL-SCNC: 138 MMOL/L (ref 136–145)
TROPONIN, HIGH SENSITIVITY: 11 NG/L (ref 0–14)
WBC # BLD AUTO: 21.2 K/UL (ref 4–11)

## 2025-02-02 PROCEDURE — 36415 COLL VENOUS BLD VENIPUNCTURE: CPT

## 2025-02-02 PROCEDURE — 87506 IADNA-DNA/RNA PROBE TQ 6-11: CPT

## 2025-02-02 PROCEDURE — 83993 ASSAY FOR CALPROTECTIN FECAL: CPT

## 2025-02-02 PROCEDURE — 2000000000 HC ICU R&B

## 2025-02-02 PROCEDURE — 93010 ELECTROCARDIOGRAM REPORT: CPT | Performed by: INTERNAL MEDICINE

## 2025-02-02 PROCEDURE — 74177 CT ABD & PELVIS W/CONTRAST: CPT

## 2025-02-02 PROCEDURE — 87449 NOS EACH ORGANISM AG IA: CPT

## 2025-02-02 PROCEDURE — 83605 ASSAY OF LACTIC ACID: CPT

## 2025-02-02 PROCEDURE — 6360000002 HC RX W HCPCS: Performed by: INTERNAL MEDICINE

## 2025-02-02 PROCEDURE — 2500000003 HC RX 250 WO HCPCS: Performed by: INTERNAL MEDICINE

## 2025-02-02 PROCEDURE — 2580000003 HC RX 258: Performed by: INTERNAL MEDICINE

## 2025-02-02 PROCEDURE — 6370000000 HC RX 637 (ALT 250 FOR IP): Performed by: INTERNAL MEDICINE

## 2025-02-02 PROCEDURE — 84484 ASSAY OF TROPONIN QUANT: CPT

## 2025-02-02 PROCEDURE — 87336 ENTAMOEB HIST DISPR AG IA: CPT

## 2025-02-02 PROCEDURE — 94760 N-INVAS EAR/PLS OXIMETRY 1: CPT

## 2025-02-02 PROCEDURE — 6360000004 HC RX CONTRAST MEDICATION: Performed by: STUDENT IN AN ORGANIZED HEALTH CARE EDUCATION/TRAINING PROGRAM

## 2025-02-02 PROCEDURE — 87328 CRYPTOSPORIDIUM AG IA: CPT

## 2025-02-02 PROCEDURE — 80048 BASIC METABOLIC PNL TOTAL CA: CPT

## 2025-02-02 PROCEDURE — 93005 ELECTROCARDIOGRAM TRACING: CPT | Performed by: STUDENT IN AN ORGANIZED HEALTH CARE EDUCATION/TRAINING PROGRAM

## 2025-02-02 PROCEDURE — 85025 COMPLETE CBC W/AUTO DIFF WBC: CPT

## 2025-02-02 PROCEDURE — 82010 KETONE BODYS QUAN: CPT

## 2025-02-02 PROCEDURE — 83630 LACTOFERRIN FECAL (QUAL): CPT

## 2025-02-02 PROCEDURE — 99233 SBSQ HOSP IP/OBS HIGH 50: CPT | Performed by: INTERNAL MEDICINE

## 2025-02-02 PROCEDURE — 87040 BLOOD CULTURE FOR BACTERIA: CPT

## 2025-02-02 PROCEDURE — 87324 CLOSTRIDIUM AG IA: CPT

## 2025-02-02 PROCEDURE — 80076 HEPATIC FUNCTION PANEL: CPT

## 2025-02-02 RX ORDER — IOPAMIDOL 755 MG/ML
75 INJECTION, SOLUTION INTRAVASCULAR
Status: COMPLETED | OUTPATIENT
Start: 2025-02-02 | End: 2025-02-02

## 2025-02-02 RX ORDER — METRONIDAZOLE 500 MG/100ML
500 INJECTION, SOLUTION INTRAVENOUS EVERY 8 HOURS
Status: DISCONTINUED | OUTPATIENT
Start: 2025-02-02 | End: 2025-02-04 | Stop reason: HOSPADM

## 2025-02-02 RX ORDER — 0.9 % SODIUM CHLORIDE 0.9 %
1000 INTRAVENOUS SOLUTION INTRAVENOUS ONCE
Status: COMPLETED | OUTPATIENT
Start: 2025-02-02 | End: 2025-02-02

## 2025-02-02 RX ORDER — LISINOPRIL 20 MG/1
20 TABLET ORAL DAILY
Status: DISCONTINUED | OUTPATIENT
Start: 2025-02-02 | End: 2025-02-04 | Stop reason: HOSPADM

## 2025-02-02 RX ORDER — METOPROLOL TARTRATE 25 MG/1
25 TABLET, FILM COATED ORAL 2 TIMES DAILY
Status: DISCONTINUED | OUTPATIENT
Start: 2025-02-02 | End: 2025-02-04 | Stop reason: HOSPADM

## 2025-02-02 RX ADMIN — LISINOPRIL 20 MG: 20 TABLET ORAL at 08:28

## 2025-02-02 RX ADMIN — WATER 1000 MG: 1 INJECTION INTRAMUSCULAR; INTRAVENOUS; SUBCUTANEOUS at 13:28

## 2025-02-02 RX ADMIN — SODIUM CHLORIDE: 9 INJECTION, SOLUTION INTRAVENOUS at 13:23

## 2025-02-02 RX ADMIN — DIAZEPAM 10 MG: 5 TABLET ORAL at 14:52

## 2025-02-02 RX ADMIN — ATORVASTATIN CALCIUM 20 MG: 20 TABLET, FILM COATED ORAL at 07:30

## 2025-02-02 RX ADMIN — AMIODARONE HYDROCHLORIDE 1 MG/MIN: 50 INJECTION, SOLUTION INTRAVENOUS at 11:17

## 2025-02-02 RX ADMIN — METRONIDAZOLE 500 MG: 500 INJECTION, SOLUTION INTRAVENOUS at 13:27

## 2025-02-02 RX ADMIN — METOPROLOL TARTRATE 25 MG: 25 TABLET, FILM COATED ORAL at 20:28

## 2025-02-02 RX ADMIN — IOPAMIDOL 75 ML: 755 INJECTION, SOLUTION INTRAVENOUS at 10:26

## 2025-02-02 RX ADMIN — ONDANSETRON 4 MG: 4 TABLET, ORALLY DISINTEGRATING ORAL at 07:30

## 2025-02-02 RX ADMIN — OXYCODONE HYDROCHLORIDE 15 MG: 10 TABLET ORAL at 00:13

## 2025-02-02 RX ADMIN — OXYCODONE HYDROCHLORIDE 15 MG: 10 TABLET ORAL at 20:27

## 2025-02-02 RX ADMIN — LEVETIRACETAM 500 MG: 500 TABLET, FILM COATED ORAL at 07:30

## 2025-02-02 RX ADMIN — METOPROLOL TARTRATE 25 MG: 25 TABLET, FILM COATED ORAL at 10:53

## 2025-02-02 RX ADMIN — GABAPENTIN 300 MG: 300 CAPSULE ORAL at 20:30

## 2025-02-02 RX ADMIN — SODIUM CHLORIDE, PRESERVATIVE FREE 10 ML: 5 INJECTION INTRAVENOUS at 07:36

## 2025-02-02 RX ADMIN — OXYBUTYNIN CHLORIDE 10 MG: 10 TABLET, EXTENDED RELEASE ORAL at 07:30

## 2025-02-02 RX ADMIN — GABAPENTIN 300 MG: 300 CAPSULE ORAL at 07:29

## 2025-02-02 RX ADMIN — LEVETIRACETAM 500 MG: 500 TABLET, FILM COATED ORAL at 20:30

## 2025-02-02 RX ADMIN — PROCHLORPERAZINE EDISYLATE 10 MG: 5 INJECTION INTRAMUSCULAR; INTRAVENOUS at 05:07

## 2025-02-02 RX ADMIN — SODIUM CHLORIDE 1000 ML: 9 INJECTION, SOLUTION INTRAVENOUS at 08:33

## 2025-02-02 RX ADMIN — OXYCODONE HYDROCHLORIDE 15 MG: 10 TABLET ORAL at 10:53

## 2025-02-02 RX ADMIN — DIAZEPAM 10 MG: 5 TABLET ORAL at 07:30

## 2025-02-02 RX ADMIN — METRONIDAZOLE 500 MG: 500 INJECTION, SOLUTION INTRAVENOUS at 20:09

## 2025-02-02 RX ADMIN — AMIODARONE HYDROCHLORIDE 1 MG/MIN: 50 INJECTION, SOLUTION INTRAVENOUS at 19:04

## 2025-02-02 RX ADMIN — SODIUM CHLORIDE 1000 ML: 9 INJECTION, SOLUTION INTRAVENOUS at 15:07

## 2025-02-02 RX ADMIN — ENOXAPARIN SODIUM 40 MG: 100 INJECTION SUBCUTANEOUS at 07:30

## 2025-02-02 RX ADMIN — GABAPENTIN 300 MG: 300 CAPSULE ORAL at 13:30

## 2025-02-02 ASSESSMENT — PAIN DESCRIPTION - ONSET
ONSET: ON-GOING

## 2025-02-02 ASSESSMENT — PAIN DESCRIPTION - LOCATION
LOCATION: BACK
LOCATION: TOE (COMMENT WHICH ONE);BACK
LOCATION: ABDOMEN
LOCATION: TOE (COMMENT WHICH ONE);BACK
LOCATION: ABDOMEN;HEAD

## 2025-02-02 ASSESSMENT — PAIN SCALES - GENERAL
PAINLEVEL_OUTOF10: 10
PAINLEVEL_OUTOF10: 6
PAINLEVEL_OUTOF10: 8
PAINLEVEL_OUTOF10: 9
PAINLEVEL_OUTOF10: 5
PAINLEVEL_OUTOF10: 2
PAINLEVEL_OUTOF10: 0

## 2025-02-02 ASSESSMENT — PAIN DESCRIPTION - FREQUENCY
FREQUENCY: CONTINUOUS

## 2025-02-02 ASSESSMENT — PAIN DESCRIPTION - ORIENTATION
ORIENTATION: RIGHT;LEFT
ORIENTATION: MID
ORIENTATION: MID
ORIENTATION: LEFT;RIGHT

## 2025-02-02 ASSESSMENT — PAIN DESCRIPTION - DESCRIPTORS
DESCRIPTORS: ACHING
DESCRIPTORS: ACHING;DISCOMFORT
DESCRIPTORS: BURNING
DESCRIPTORS: ACHING;DISCOMFORT;TENDER
DESCRIPTORS: BURNING

## 2025-02-02 ASSESSMENT — PAIN - FUNCTIONAL ASSESSMENT
PAIN_FUNCTIONAL_ASSESSMENT: ACTIVITIES ARE NOT PREVENTED
PAIN_FUNCTIONAL_ASSESSMENT: PREVENTS OR INTERFERES WITH MANY ACTIVE NOT PASSIVE ACTIVITIES
PAIN_FUNCTIONAL_ASSESSMENT: ACTIVITIES ARE NOT PREVENTED
PAIN_FUNCTIONAL_ASSESSMENT: PREVENTS OR INTERFERES WITH ALL ACTIVE AND SOME PASSIVE ACTIVITIES

## 2025-02-02 ASSESSMENT — PAIN DESCRIPTION - PAIN TYPE
TYPE: CHRONIC PAIN

## 2025-02-02 NOTE — PLAN OF CARE
Problem: Safety - Adult  Goal: Free from fall injury  2/2/2025 0031 by Yumiko Marcano RN  Outcome: Progressing  2/1/2025 1354 by Kendra Neely RN  Outcome: Progressing  Flowsheets (Taken 2/1/2025 1354)  Free From Fall Injury: Instruct family/caregiver on patient safety     Problem: Discharge Planning  Goal: Discharge to home or other facility with appropriate resources  2/2/2025 0031 by Yumiko Marcano RN  Outcome: Progressing  Flowsheets (Taken 2/1/2025 2000)  Discharge to home or other facility with appropriate resources:   Identify barriers to discharge with patient and caregiver   Arrange for needed discharge resources and transportation as appropriate   Arrange for interpreters to assist at discharge as needed   Identify discharge learning needs (meds, wound care, etc)   Refer to discharge planning if patient needs post-hospital services based on physician order or complex needs related to functional status, cognitive ability or social support system  2/1/2025 1354 by Kendra Neely RN  Outcome: Progressing  Flowsheets (Taken 2/1/2025 1354)  Discharge to home or other facility with appropriate resources:   Identify barriers to discharge with patient and caregiver   Identify discharge learning needs (meds, wound care, etc)   Refer to discharge planning if patient needs post-hospital services based on physician order or complex needs related to functional status, cognitive ability or social support system   Arrange for needed discharge resources and transportation as appropriate     Problem: Pain  Goal: Verbalizes/displays adequate comfort level or baseline comfort level  2/2/2025 0031 by Yumiko Marcano RN  Outcome: Progressing  Flowsheets (Taken 2/1/2025 2134)  Verbalizes/displays adequate comfort level or baseline comfort level:   Encourage patient to monitor pain and request assistance   Assess pain using appropriate pain scale   Implement non-pharmacological measures as appropriate and

## 2025-02-03 ENCOUNTER — APPOINTMENT (OUTPATIENT)
Age: 60
DRG: 309 | End: 2025-02-03
Attending: INTERNAL MEDICINE
Payer: MEDICARE

## 2025-02-03 LAB
ALBUMIN SERPL ELPH-MCNC: 3.6 G/DL (ref 3.1–4.9)
ALBUMIN SERPL-MCNC: 3.8 G/DL (ref 3.4–5)
ALP SERPL-CCNC: 67 U/L (ref 40–129)
ALPHA1 GLOB SERPL ELPH-MCNC: 0.3 G/DL (ref 0.2–0.4)
ALPHA2 GLOB SERPL ELPH-MCNC: 1.1 G/DL (ref 0.4–1.1)
ALT SERPL-CCNC: 7 U/L (ref 10–40)
ANION GAP SERPL CALCULATED.3IONS-SCNC: 11 MMOL/L (ref 3–16)
AST SERPL-CCNC: 21 U/L (ref 15–37)
B-GLOBULIN SERPL ELPH-MCNC: 1.4 G/DL (ref 0.9–1.6)
BASOPHILS # BLD: 0.2 K/UL (ref 0–0.2)
BASOPHILS NFR BLD: 0.9 %
BILIRUB DIRECT SERPL-MCNC: 0.3 MG/DL (ref 0–0.3)
BILIRUB INDIRECT SERPL-MCNC: 0.4 MG/DL (ref 0–1)
BILIRUB SERPL-MCNC: 0.7 MG/DL (ref 0–1)
BUN SERPL-MCNC: 17 MG/DL (ref 7–20)
CALCIUM SERPL-MCNC: 9.7 MG/DL (ref 8.3–10.6)
CHLORIDE SERPL-SCNC: 106 MMOL/L (ref 99–110)
CO2 SERPL-SCNC: 25 MMOL/L (ref 21–32)
CREAT SERPL-MCNC: 0.8 MG/DL (ref 0.6–1.2)
DEPRECATED RDW RBC AUTO: 13.3 % (ref 12.4–15.4)
ECHO AO ROOT DIAM: 3.2 CM
ECHO AO ROOT INDEX: 1.79 CM/M2
ECHO AV PEAK GRADIENT: 6 MMHG
ECHO AV PEAK VELOCITY: 1.2 M/S
ECHO AV VELOCITY RATIO: 1
ECHO BSA: 1.78 M2
ECHO LA AREA 2C: 18.6 CM2
ECHO LA AREA 4C: 21.9 CM2
ECHO LA DIAMETER INDEX: 1.73 CM/M2
ECHO LA DIAMETER: 3.1 CM
ECHO LA MAJOR AXIS: 5.9 CM
ECHO LA MINOR AXIS: 4.8 CM
ECHO LA TO AORTIC ROOT RATIO: 0.97
ECHO LA VOL BP: 65 ML (ref 22–52)
ECHO LA VOL MOD A4C: 55 ML (ref 22–52)
ECHO LA VOL/BSA BIPLANE: 36 ML/M2 (ref 16–34)
ECHO LA VOLUME INDEX MOD A4C: 31 ML/M2 (ref 16–34)
ECHO LV E' LATERAL VELOCITY: 6.39 CM/S
ECHO LV E' SEPTAL VELOCITY: 4.74 CM/S
ECHO LV EF PHYSICIAN: 60 %
ECHO LV FRACTIONAL SHORTENING: 37 % (ref 28–44)
ECHO LV INTERNAL DIMENSION DIASTOLE INDEX: 2.74 CM/M2
ECHO LV INTERNAL DIMENSION DIASTOLIC: 4.9 CM (ref 3.9–5.3)
ECHO LV INTERNAL DIMENSION SYSTOLIC INDEX: 1.73 CM/M2
ECHO LV INTERNAL DIMENSION SYSTOLIC: 3.1 CM
ECHO LV IVSD: 0.9 CM (ref 0.6–0.9)
ECHO LV MASS 2D: 141.9 G (ref 67–162)
ECHO LV MASS INDEX 2D: 79.3 G/M2 (ref 43–95)
ECHO LV POSTERIOR WALL DIASTOLIC: 0.8 CM (ref 0.6–0.9)
ECHO LV RELATIVE WALL THICKNESS RATIO: 0.33
ECHO LVOT PEAK GRADIENT: 6 MMHG
ECHO LVOT PEAK VELOCITY: 1.2 M/S
ECHO MV A VELOCITY: 0.77 M/S
ECHO MV E DECELERATION TIME (DT): 314 MS
ECHO MV E VELOCITY: 0.5 M/S
ECHO MV E/A RATIO: 0.65
ECHO MV E/E' LATERAL: 7.82
ECHO MV E/E' RATIO (AVERAGED): 9.19
ECHO MV E/E' SEPTAL: 10.55
ECHO PULMONARY ARTERY END DIASTOLIC PRESSURE: 2 MMHG
ECHO PV MAX VELOCITY: 0.9 M/S
ECHO PV PEAK GRADIENT: 3 MMHG
ECHO PV REGURGITANT MAX VELOCITY: 0.7 M/S
ECHO RA AREA 4C: 11.2 CM2
ECHO RA END SYSTOLIC VOLUME APICAL 4 CHAMBER INDEX BSA: 12 ML/M2
ECHO RA VOLUME: 22 ML
ECHO RV FREE WALL PEAK S': 14.1 CM/S
ECHO RV INTERNAL DIMENSION: 2.6 CM
ECHO RV TAPSE: 2.6 CM (ref 1.7–?)
ECHO TV REGURGITANT MAX VELOCITY: 2.36 M/S
ECHO TV REGURGITANT PEAK GRADIENT: 22 MMHG
EKG ATRIAL RATE: 187 BPM
EKG DIAGNOSIS: NORMAL
EKG Q-T INTERVAL: 280 MS
EKG QRS DURATION: 110 MS
EKG QTC CALCULATION (BAZETT): 461 MS
EKG R AXIS: -36 DEGREES
EKG T AXIS: 158 DEGREES
EKG VENTRICULAR RATE: 163 BPM
EOSINOPHIL # BLD: 0 K/UL (ref 0–0.6)
EOSINOPHIL NFR BLD: 0.2 %
GAMMA GLOB SERPL ELPH-MCNC: 1.8 G/DL (ref 0.6–1.8)
GFR SERPLBLD CREATININE-BSD FMLA CKD-EPI: 84 ML/MIN/{1.73_M2}
GI PATHOGENS PNL STL NAA+PROBE: NORMAL
GLUCOSE SERPL-MCNC: 95 MG/DL (ref 70–99)
HCT VFR BLD AUTO: 45.8 % (ref 36–48)
HGB BLD-MCNC: 15.5 G/DL (ref 12–16)
LACTATE BLDV-SCNC: 0.8 MMOL/L (ref 0.4–2)
LACTATE BLDV-SCNC: 1.1 MMOL/L (ref 0.4–2)
LACTATE BLDV-SCNC: 1.1 MMOL/L (ref 0.4–2)
LACTATE BLDV-SCNC: 1.2 MMOL/L (ref 0.4–2)
LACTATE BLDV-SCNC: 1.7 MMOL/L (ref 0.4–2)
LACTATE BLDV-SCNC: 1.7 MMOL/L (ref 0.4–2)
LYMPHOCYTES # BLD: 2.4 K/UL (ref 1–5.1)
LYMPHOCYTES NFR BLD: 13 %
MAGNESIUM SERPL-MCNC: 1.95 MG/DL (ref 1.8–2.4)
MCH RBC QN AUTO: 29.8 PG (ref 26–34)
MCHC RBC AUTO-ENTMCNC: 33.8 G/DL (ref 31–36)
MCV RBC AUTO: 88 FL (ref 80–100)
MONOCYTES # BLD: 1.4 K/UL (ref 0–1.3)
MONOCYTES NFR BLD: 7.8 %
NEUTROPHILS # BLD: 14.3 K/UL (ref 1.7–7.7)
NEUTROPHILS NFR BLD: 78.1 %
PLATELET # BLD AUTO: 229 K/UL (ref 135–450)
PMV BLD AUTO: 9.5 FL (ref 5–10.5)
POTASSIUM SERPL-SCNC: 3.3 MMOL/L (ref 3.5–5.1)
PROT SERPL-MCNC: 7.3 G/DL (ref 6.4–8.2)
PROT SERPL-MCNC: 8.1 G/DL (ref 6.4–8.2)
RBC # BLD AUTO: 5.21 M/UL (ref 4–5.2)
SODIUM SERPL-SCNC: 142 MMOL/L (ref 136–145)
SPE/IFE INTERPRETATION: NORMAL
WBC # BLD AUTO: 18.3 K/UL (ref 4–11)

## 2025-02-03 PROCEDURE — 6360000002 HC RX W HCPCS: Performed by: INTERNAL MEDICINE

## 2025-02-03 PROCEDURE — 94760 N-INVAS EAR/PLS OXIMETRY 1: CPT

## 2025-02-03 PROCEDURE — 9990000010 HC NO CHARGE VISIT

## 2025-02-03 PROCEDURE — 2700000000 HC OXYGEN THERAPY PER DAY

## 2025-02-03 PROCEDURE — 97530 THERAPEUTIC ACTIVITIES: CPT

## 2025-02-03 PROCEDURE — 2500000003 HC RX 250 WO HCPCS: Performed by: INTERNAL MEDICINE

## 2025-02-03 PROCEDURE — 94761 N-INVAS EAR/PLS OXIMETRY MLT: CPT

## 2025-02-03 PROCEDURE — 80048 BASIC METABOLIC PNL TOTAL CA: CPT

## 2025-02-03 PROCEDURE — 85025 COMPLETE CBC W/AUTO DIFF WBC: CPT

## 2025-02-03 PROCEDURE — 93306 TTE W/DOPPLER COMPLETE: CPT

## 2025-02-03 PROCEDURE — 97166 OT EVAL MOD COMPLEX 45 MIN: CPT

## 2025-02-03 PROCEDURE — 97535 SELF CARE MNGMENT TRAINING: CPT

## 2025-02-03 PROCEDURE — 83605 ASSAY OF LACTIC ACID: CPT

## 2025-02-03 PROCEDURE — 97116 GAIT TRAINING THERAPY: CPT

## 2025-02-03 PROCEDURE — 93306 TTE W/DOPPLER COMPLETE: CPT | Performed by: INTERNAL MEDICINE

## 2025-02-03 PROCEDURE — 2580000003 HC RX 258: Performed by: NURSE PRACTITIONER

## 2025-02-03 PROCEDURE — 93010 ELECTROCARDIOGRAM REPORT: CPT | Performed by: INTERNAL MEDICINE

## 2025-02-03 PROCEDURE — 99222 1ST HOSP IP/OBS MODERATE 55: CPT | Performed by: INTERNAL MEDICINE

## 2025-02-03 PROCEDURE — 80076 HEPATIC FUNCTION PANEL: CPT

## 2025-02-03 PROCEDURE — 83735 ASSAY OF MAGNESIUM: CPT

## 2025-02-03 PROCEDURE — 6370000000 HC RX 637 (ALT 250 FOR IP): Performed by: INTERNAL MEDICINE

## 2025-02-03 PROCEDURE — 36415 COLL VENOUS BLD VENIPUNCTURE: CPT

## 2025-02-03 PROCEDURE — 99232 SBSQ HOSP IP/OBS MODERATE 35: CPT | Performed by: STUDENT IN AN ORGANIZED HEALTH CARE EDUCATION/TRAINING PROGRAM

## 2025-02-03 PROCEDURE — 99233 SBSQ HOSP IP/OBS HIGH 50: CPT | Performed by: INTERNAL MEDICINE

## 2025-02-03 PROCEDURE — 2060000000 HC ICU INTERMEDIATE R&B

## 2025-02-03 PROCEDURE — 6360000002 HC RX W HCPCS: Performed by: NURSE PRACTITIONER

## 2025-02-03 PROCEDURE — 97162 PT EVAL MOD COMPLEX 30 MIN: CPT

## 2025-02-03 RX ORDER — FLECAINIDE ACETATE 100 MG/1
50 TABLET ORAL EVERY 12 HOURS SCHEDULED
Status: DISCONTINUED | OUTPATIENT
Start: 2025-02-03 | End: 2025-02-04 | Stop reason: HOSPADM

## 2025-02-03 RX ADMIN — OXYCODONE HYDROCHLORIDE 15 MG: 10 TABLET ORAL at 08:29

## 2025-02-03 RX ADMIN — LISINOPRIL 20 MG: 20 TABLET ORAL at 08:29

## 2025-02-03 RX ADMIN — ATORVASTATIN CALCIUM 20 MG: 20 TABLET, FILM COATED ORAL at 08:29

## 2025-02-03 RX ADMIN — ENOXAPARIN SODIUM 40 MG: 100 INJECTION SUBCUTANEOUS at 08:29

## 2025-02-03 RX ADMIN — METHOCARBAMOL 500 MG: 500 TABLET ORAL at 21:46

## 2025-02-03 RX ADMIN — GABAPENTIN 300 MG: 300 CAPSULE ORAL at 13:31

## 2025-02-03 RX ADMIN — METOPROLOL TARTRATE 25 MG: 25 TABLET, FILM COATED ORAL at 08:29

## 2025-02-03 RX ADMIN — SODIUM CHLORIDE, PRESERVATIVE FREE 10 ML: 5 INJECTION INTRAVENOUS at 08:31

## 2025-02-03 RX ADMIN — AMIODARONE HYDROCHLORIDE 0.5 MG/MIN: 50 INJECTION, SOLUTION INTRAVENOUS at 03:13

## 2025-02-03 RX ADMIN — METOPROLOL TARTRATE 25 MG: 25 TABLET, FILM COATED ORAL at 21:46

## 2025-02-03 RX ADMIN — FLECAINIDE ACETATE 50 MG: 100 TABLET ORAL at 21:46

## 2025-02-03 RX ADMIN — FLECAINIDE ACETATE 50 MG: 100 TABLET ORAL at 13:31

## 2025-02-03 RX ADMIN — OXYCODONE HYDROCHLORIDE 15 MG: 10 TABLET ORAL at 18:13

## 2025-02-03 RX ADMIN — SODIUM CHLORIDE, PRESERVATIVE FREE 10 ML: 5 INJECTION INTRAVENOUS at 21:53

## 2025-02-03 RX ADMIN — METRONIDAZOLE 500 MG: 500 INJECTION, SOLUTION INTRAVENOUS at 05:32

## 2025-02-03 RX ADMIN — METRONIDAZOLE 500 MG: 500 INJECTION, SOLUTION INTRAVENOUS at 11:03

## 2025-02-03 RX ADMIN — LEVETIRACETAM 500 MG: 500 TABLET, FILM COATED ORAL at 08:29

## 2025-02-03 RX ADMIN — GABAPENTIN 300 MG: 300 CAPSULE ORAL at 08:29

## 2025-02-03 RX ADMIN — METRONIDAZOLE 500 MG: 500 INJECTION, SOLUTION INTRAVENOUS at 20:00

## 2025-02-03 RX ADMIN — LEVETIRACETAM 500 MG: 500 TABLET, FILM COATED ORAL at 21:46

## 2025-02-03 RX ADMIN — GABAPENTIN 300 MG: 300 CAPSULE ORAL at 21:46

## 2025-02-03 RX ADMIN — OXYBUTYNIN CHLORIDE 10 MG: 10 TABLET, EXTENDED RELEASE ORAL at 08:29

## 2025-02-03 RX ADMIN — WATER 1000 MG: 1 INJECTION INTRAMUSCULAR; INTRAVENOUS; SUBCUTANEOUS at 13:30

## 2025-02-03 ASSESSMENT — PAIN DESCRIPTION - DESCRIPTORS
DESCRIPTORS: ACHING
DESCRIPTORS: NAGGING

## 2025-02-03 ASSESSMENT — PAIN SCALES - GENERAL
PAINLEVEL_OUTOF10: 8
PAINLEVEL_OUTOF10: 0
PAINLEVEL_OUTOF10: 8
PAINLEVEL_OUTOF10: 0
PAINLEVEL_OUTOF10: 8

## 2025-02-03 ASSESSMENT — PAIN DESCRIPTION - FREQUENCY: FREQUENCY: CONTINUOUS

## 2025-02-03 ASSESSMENT — PAIN DESCRIPTION - ONSET: ONSET: ON-GOING

## 2025-02-03 ASSESSMENT — PAIN DESCRIPTION - ORIENTATION
ORIENTATION: MID;POSTERIOR
ORIENTATION: LEFT

## 2025-02-03 ASSESSMENT — PAIN DESCRIPTION - LOCATION
LOCATION: BACK;FOOT
LOCATION: BACK
LOCATION: BACK

## 2025-02-03 ASSESSMENT — PAIN - FUNCTIONAL ASSESSMENT: PAIN_FUNCTIONAL_ASSESSMENT: ACTIVITIES ARE NOT PREVENTED

## 2025-02-03 ASSESSMENT — PAIN DESCRIPTION - PAIN TYPE: TYPE: CHRONIC PAIN

## 2025-02-03 NOTE — PLAN OF CARE
Problem: Safety - Adult  Goal: Free from fall injury  2/3/2025 1533 by Kerley, Jessica L, RN  Outcome: Progressing  Flowsheets (Taken 2/3/2025 1532)  Free From Fall Injury: Instruct family/caregiver on patient safety  2/3/2025 0651 by Evan Christianson RN  Outcome: Progressing     Problem: Discharge Planning  Goal: Discharge to home or other facility with appropriate resources  2/3/2025 1533 by Kerley, Jessica L, RN  Outcome: Progressing  Flowsheets (Taken 2/3/2025 0800)  Discharge to home or other facility with appropriate resources: Identify barriers to discharge with patient and caregiver  2/3/2025 0651 by Evan Christianson RN  Outcome: Progressing     Problem: Pain  Goal: Verbalizes/displays adequate comfort level or baseline comfort level  2/3/2025 1533 by Kerley, Jessica L, RN  Outcome: Progressing  Flowsheets (Taken 2/3/2025 0800)  Verbalizes/displays adequate comfort level or baseline comfort level: Encourage patient to monitor pain and request assistance  2/3/2025 0651 by Evan Christianson RN  Outcome: Progressing     Problem: Skin/Tissue Integrity  Goal: Skin integrity remains intact  Description: 1.  Monitor for areas of redness and/or skin breakdown  2.  Assess vascular access sites hourly  3.  Every 4-6 hours minimum:  Change oxygen saturation probe site  4.  Every 4-6 hours:  If on nasal continuous positive airway pressure, respiratory therapy assess nares and determine need for appliance change or resting period  2/3/2025 1533 by Kerley, Jessica L, RN  Outcome: Progressing  Flowsheets  Taken 2/3/2025 1532  Skin Integrity Remains Intact:   Monitor for areas of redness and/or skin breakdown   Every 4-6 hours minimum: Change oxygen saturation probe site  Taken 2/3/2025 0800  Skin Integrity Remains Intact:   Monitor for areas of redness and/or skin breakdown   Every 4-6 hours minimum: Change oxygen saturation probe site  2/3/2025 0651 by Evan Christianson RN  Outcome: Progressing     Problem:

## 2025-02-03 NOTE — PLAN OF CARE
Problem: Safety - Adult  Goal: Free from fall injury  Outcome: Progressing     Problem: Discharge Planning  Goal: Discharge to home or other facility with appropriate resources  Outcome: Progressing     Problem: Pain  Goal: Verbalizes/displays adequate comfort level or baseline comfort level  Outcome: Progressing     Problem: Skin/Tissue Integrity  Goal: Skin integrity remains intact  Description: 1.  Monitor for areas of redness and/or skin breakdown  2.  Assess vascular access sites hourly  3.  Every 4-6 hours minimum:  Change oxygen saturation probe site  4.  Every 4-6 hours:  If on nasal continuous positive airway pressure, respiratory therapy assess nares and determine need for appliance change or resting period  Outcome: Progressing     Problem: Skin/Tissue Integrity - Adult  Goal: Skin integrity remains intact  Description: 1.  Monitor for areas of redness and/or skin breakdown  2.  Assess vascular access sites hourly  3.  Every 4-6 hours minimum:  Change oxygen saturation probe site  4.  Every 4-6 hours:  If on nasal continuous positive airway pressure, respiratory therapy assess nares and determine need for appliance change or resting period  Outcome: Progressing

## 2025-02-04 VITALS
WEIGHT: 143.3 LBS | BODY MASS INDEX: 21.22 KG/M2 | TEMPERATURE: 98.6 F | RESPIRATION RATE: 21 BRPM | SYSTOLIC BLOOD PRESSURE: 138 MMHG | DIASTOLIC BLOOD PRESSURE: 103 MMHG | HEART RATE: 75 BPM | HEIGHT: 69 IN | OXYGEN SATURATION: 96 %

## 2025-02-04 DIAGNOSIS — G89.29 CHRONIC PAIN OF LEFT LOWER EXTREMITY: ICD-10-CM

## 2025-02-04 DIAGNOSIS — M79.605 CHRONIC PAIN OF LEFT LOWER EXTREMITY: ICD-10-CM

## 2025-02-04 LAB
ALBUMIN SERPL-MCNC: 3.7 G/DL (ref 3.4–5)
ALP SERPL-CCNC: 77 U/L (ref 40–129)
ALT SERPL-CCNC: 8 U/L (ref 10–40)
ANION GAP SERPL CALCULATED.3IONS-SCNC: 11 MMOL/L (ref 3–16)
AST SERPL-CCNC: 25 U/L (ref 15–37)
BASOPHILS # BLD: 0.1 K/UL (ref 0–0.2)
BASOPHILS NFR BLD: 1.1 %
BILIRUB DIRECT SERPL-MCNC: 0.2 MG/DL (ref 0–0.3)
BILIRUB INDIRECT SERPL-MCNC: 0.2 MG/DL (ref 0–1)
BILIRUB SERPL-MCNC: 0.4 MG/DL (ref 0–1)
BUN SERPL-MCNC: 18 MG/DL (ref 7–20)
CALCIUM SERPL-MCNC: 9.3 MG/DL (ref 8.3–10.6)
CHLORIDE SERPL-SCNC: 105 MMOL/L (ref 99–110)
CO2 SERPL-SCNC: 23 MMOL/L (ref 21–32)
CREAT SERPL-MCNC: 0.9 MG/DL (ref 0.6–1.2)
DEPRECATED RDW RBC AUTO: 13.3 % (ref 12.4–15.4)
EOSINOPHIL # BLD: 0.3 K/UL (ref 0–0.6)
EOSINOPHIL NFR BLD: 1.9 %
GFR SERPLBLD CREATININE-BSD FMLA CKD-EPI: 73 ML/MIN/{1.73_M2}
GLUCOSE SERPL-MCNC: 90 MG/DL (ref 70–99)
HCT VFR BLD AUTO: 41.3 % (ref 36–48)
HGB BLD-MCNC: 13.9 G/DL (ref 12–16)
KAPPA LC FREE SER-MCNC: 34 MG/L (ref 2.37–20.73)
KAPPA LC FREE/LAMBDA FREE SER: 3.01 {RATIO} (ref 0.22–1.74)
LACTATE BLDV-SCNC: 0.8 MMOL/L (ref 0.4–2)
LACTATE BLDV-SCNC: 0.9 MMOL/L (ref 0.4–2)
LAMBDA LC FREE SERPL-MCNC: 11.3 MG/L (ref 4.23–27.69)
LYMPHOCYTES # BLD: 3.4 K/UL (ref 1–5.1)
LYMPHOCYTES NFR BLD: 24.5 %
MAGNESIUM SERPL-MCNC: 2.02 MG/DL (ref 1.8–2.4)
MCH RBC QN AUTO: 29.6 PG (ref 26–34)
MCHC RBC AUTO-ENTMCNC: 33.6 G/DL (ref 31–36)
MCV RBC AUTO: 88 FL (ref 80–100)
MONOCYTES # BLD: 1.2 K/UL (ref 0–1.3)
MONOCYTES NFR BLD: 8.6 %
NEUTROPHILS # BLD: 9 K/UL (ref 1.7–7.7)
NEUTROPHILS NFR BLD: 63.9 %
PLATELET # BLD AUTO: 220 K/UL (ref 135–450)
PMV BLD AUTO: 9.2 FL (ref 5–10.5)
POTASSIUM SERPL-SCNC: 3.3 MMOL/L (ref 3.5–5.1)
PROT SERPL-MCNC: 7.1 G/DL (ref 6.4–8.2)
RBC # BLD AUTO: 4.7 M/UL (ref 4–5.2)
RPT COMMENT: ABNORMAL
SODIUM SERPL-SCNC: 139 MMOL/L (ref 136–145)
WBC # BLD AUTO: 14.1 K/UL (ref 4–11)

## 2025-02-04 PROCEDURE — 2500000003 HC RX 250 WO HCPCS: Performed by: INTERNAL MEDICINE

## 2025-02-04 PROCEDURE — 6360000002 HC RX W HCPCS: Performed by: INTERNAL MEDICINE

## 2025-02-04 PROCEDURE — 85025 COMPLETE CBC W/AUTO DIFF WBC: CPT

## 2025-02-04 PROCEDURE — 80048 BASIC METABOLIC PNL TOTAL CA: CPT

## 2025-02-04 PROCEDURE — 83735 ASSAY OF MAGNESIUM: CPT

## 2025-02-04 PROCEDURE — 6370000000 HC RX 637 (ALT 250 FOR IP): Performed by: STUDENT IN AN ORGANIZED HEALTH CARE EDUCATION/TRAINING PROGRAM

## 2025-02-04 PROCEDURE — 83605 ASSAY OF LACTIC ACID: CPT

## 2025-02-04 PROCEDURE — 36415 COLL VENOUS BLD VENIPUNCTURE: CPT

## 2025-02-04 PROCEDURE — 80076 HEPATIC FUNCTION PANEL: CPT

## 2025-02-04 PROCEDURE — 94760 N-INVAS EAR/PLS OXIMETRY 1: CPT

## 2025-02-04 PROCEDURE — 6370000000 HC RX 637 (ALT 250 FOR IP): Performed by: INTERNAL MEDICINE

## 2025-02-04 RX ORDER — OXYCODONE HYDROCHLORIDE 15 MG/1
15 TABLET ORAL EVERY 6 HOURS PRN
Qty: 110 TABLET | Refills: 0
Start: 2025-02-04 | End: 2025-02-05 | Stop reason: SDUPTHER

## 2025-02-04 RX ORDER — OXYCODONE HYDROCHLORIDE 15 MG/1
TABLET ORAL
Qty: 110 TABLET | Refills: 0 | OUTPATIENT
Start: 2025-02-04

## 2025-02-04 RX ORDER — POTASSIUM CHLORIDE 1500 MG/1
40 TABLET, EXTENDED RELEASE ORAL ONCE
Status: COMPLETED | OUTPATIENT
Start: 2025-02-04 | End: 2025-02-04

## 2025-02-04 RX ORDER — METOPROLOL TARTRATE 25 MG/1
25 TABLET, FILM COATED ORAL 2 TIMES DAILY
Qty: 60 TABLET | Refills: 3 | Status: SHIPPED | OUTPATIENT
Start: 2025-02-04

## 2025-02-04 RX ADMIN — METHOCARBAMOL 500 MG: 500 TABLET ORAL at 04:52

## 2025-02-04 RX ADMIN — LEVETIRACETAM 500 MG: 500 TABLET, FILM COATED ORAL at 08:44

## 2025-02-04 RX ADMIN — SUMATRIPTAN SUCCINATE 100 MG: 50 TABLET ORAL at 00:25

## 2025-02-04 RX ADMIN — ATORVASTATIN CALCIUM 20 MG: 20 TABLET, FILM COATED ORAL at 08:44

## 2025-02-04 RX ADMIN — WATER 1000 MG: 1 INJECTION INTRAMUSCULAR; INTRAVENOUS; SUBCUTANEOUS at 11:42

## 2025-02-04 RX ADMIN — METRONIDAZOLE 500 MG: 500 INJECTION, SOLUTION INTRAVENOUS at 11:46

## 2025-02-04 RX ADMIN — FLECAINIDE ACETATE 50 MG: 100 TABLET ORAL at 08:44

## 2025-02-04 RX ADMIN — OXYBUTYNIN CHLORIDE 10 MG: 10 TABLET, EXTENDED RELEASE ORAL at 08:44

## 2025-02-04 RX ADMIN — OXYCODONE HYDROCHLORIDE 15 MG: 10 TABLET ORAL at 00:24

## 2025-02-04 RX ADMIN — ENOXAPARIN SODIUM 40 MG: 100 INJECTION SUBCUTANEOUS at 08:44

## 2025-02-04 RX ADMIN — METOPROLOL TARTRATE 25 MG: 25 TABLET, FILM COATED ORAL at 08:44

## 2025-02-04 RX ADMIN — OXYCODONE HYDROCHLORIDE 15 MG: 10 TABLET ORAL at 07:35

## 2025-02-04 RX ADMIN — SODIUM CHLORIDE, PRESERVATIVE FREE 10 ML: 5 INJECTION INTRAVENOUS at 08:46

## 2025-02-04 RX ADMIN — GABAPENTIN 300 MG: 300 CAPSULE ORAL at 08:44

## 2025-02-04 RX ADMIN — METRONIDAZOLE 500 MG: 500 INJECTION, SOLUTION INTRAVENOUS at 04:34

## 2025-02-04 RX ADMIN — LISINOPRIL 20 MG: 20 TABLET ORAL at 08:44

## 2025-02-04 RX ADMIN — POTASSIUM CHLORIDE 40 MEQ: 1500 TABLET, EXTENDED RELEASE ORAL at 09:44

## 2025-02-04 ASSESSMENT — PAIN SCALES - GENERAL
PAINLEVEL_OUTOF10: 4
PAINLEVEL_OUTOF10: 8
PAINLEVEL_OUTOF10: 6
PAINLEVEL_OUTOF10: 7
PAINLEVEL_OUTOF10: 4
PAINLEVEL_OUTOF10: 0

## 2025-02-04 ASSESSMENT — PAIN DESCRIPTION - PAIN TYPE: TYPE: CHRONIC PAIN

## 2025-02-04 ASSESSMENT — PAIN DESCRIPTION - DESCRIPTORS
DESCRIPTORS: BURNING
DESCRIPTORS: ACHING;SPASM
DESCRIPTORS: ACHING
DESCRIPTORS: ACHING

## 2025-02-04 ASSESSMENT — PAIN DESCRIPTION - ORIENTATION
ORIENTATION: MID;POSTERIOR
ORIENTATION: LEFT
ORIENTATION: MID;POSTERIOR
ORIENTATION: MID;POSTERIOR

## 2025-02-04 ASSESSMENT — PAIN - FUNCTIONAL ASSESSMENT
PAIN_FUNCTIONAL_ASSESSMENT: PREVENTS OR INTERFERES SOME ACTIVE ACTIVITIES AND ADLS

## 2025-02-04 ASSESSMENT — PAIN DESCRIPTION - LOCATION
LOCATION: BACK;FOOT
LOCATION: BACK
LOCATION: BACK
LOCATION: BACK;HEAD

## 2025-02-04 NOTE — CARE COORDINATION
CASE MANAGEMENT DISCHARGE SUMMARY: Discharge order noted. Patient returning to home w/nephew. No needs identified. Will be given a 30 day monitor at discharge.     DISCHARGE DATE: 2/4/25    DISCHARGED TO HOME     TRANSPORTATION: Nephew             TIME: Afternoon     PREFERRED PHARMACY: [facility]       Casie MISHRA, RN  Case Management  [phone]             Electronically signed by Casie Castano RN on 2/4/2025 at 12:19 PM

## 2025-02-04 NOTE — PLAN OF CARE
Problem: Safety - Adult  Goal: Free from fall injury  Outcome: Completed     Problem: Discharge Planning  Goal: Discharge to home or other facility with appropriate resources  Outcome: Completed     Problem: Pain  Goal: Verbalizes/displays adequate comfort level or baseline comfort level  Outcome: Completed     Problem: Skin/Tissue Integrity  Goal: Skin integrity remains intact  Description: 1.  Monitor for areas of redness and/or skin breakdown  2.  Assess vascular access sites hourly  3.  Every 4-6 hours minimum:  Change oxygen saturation probe site  4.  Every 4-6 hours:  If on nasal continuous positive airway pressure, respiratory therapy assess nares and determine need for appliance change or resting period  Outcome: Completed  Flowsheets (Taken 2/4/2025 0800)  Skin Integrity Remains Intact: Monitor for areas of redness and/or skin breakdown     Problem: Skin/Tissue Integrity - Adult  Goal: Skin integrity remains intact  Description: 1.  Monitor for areas of redness and/or skin breakdown  2.  Assess vascular access sites hourly  3.  Every 4-6 hours minimum:  Change oxygen saturation probe site  4.  Every 4-6 hours:  If on nasal continuous positive airway pressure, respiratory therapy assess nares and determine need for appliance change or resting period  Outcome: Completed  Flowsheets (Taken 2/4/2025 0800)  Skin Integrity Remains Intact: Monitor for areas of redness and/or skin breakdown     Problem: Gastrointestinal - Adult  Goal: Minimal or absence of nausea and vomiting  Outcome: Completed  Flowsheets (Taken 2/4/2025 0800)  Minimal or absence of nausea and vomiting: Administer IV fluids as ordered to ensure adequate hydration     Problem: Chronic Conditions and Co-morbidities  Goal: Patient's chronic conditions and co-morbidity symptoms are monitored and maintained or improved  Outcome: Completed     Problem: Cardiovascular - Adult  Goal: Maintains optimal cardiac output and hemodynamic stability  Outcome:

## 2025-02-04 NOTE — PLAN OF CARE
Problem: Safety - Adult  Goal: Free from fall injury  2/3/2025 2320 by Negro Pizarro RN  Outcome: Progressing     Problem: Discharge Planning  Goal: Discharge to home or other facility with appropriate resources  2/3/2025 2320 by Negro Pizarro RN  Outcome: Progressing  Flowsheets (Taken 2/3/2025 2000)  Discharge to home or other facility with appropriate resources:   Identify barriers to discharge with patient and caregiver   Arrange for needed discharge resources and transportation as appropriate   Identify discharge learning needs (meds, wound care, etc)     Problem: Pain  Goal: Verbalizes/displays adequate comfort level or baseline comfort level  2/3/2025 2320 by Negro Pizarro RN  Outcome: Progressing    Problem: Skin/Tissue Integrity  Goal: Skin integrity remains intact  Description: 1.  Monitor for areas of redness and/or skin breakdown  2.  Assess vascular access sites hourly  3.  Every 4-6 hours minimum:  Change oxygen saturation probe site  4.  Every 4-6 hours:  If on nasal continuous positive airway pressure, respiratory therapy assess nares and determine need for appliance change or resting period  2/3/2025 2320 by Negro Pizarro RN  Outcome: Progressing  Flowsheets (Taken 2/3/2025 2000)  Skin Integrity Remains Intact: Monitor for areas of redness and/or skin breakdown     Problem: Skin/Tissue Integrity - Adult  Goal: Skin integrity remains intact  Description: 1.  Monitor for areas of redness and/or skin breakdown  2.  Assess vascular access sites hourly  3.  Every 4-6 hours minimum:  Change oxygen saturation probe site  4.  Every 4-6 hours:  If on nasal continuous positive airway pressure, respiratory therapy assess nares and determine need for appliance change or resting period  2/3/2025 2320 by Negro Pizarro, RN  Outcome: Progressing  Flowsheets (Taken 2/3/2025 2000)  Skin Integrity Remains Intact: Monitor for areas of redness and/or skin breakdown  Flowsheets  Taken 2/3/2025 1532  Skin Integrity

## 2025-02-05 DIAGNOSIS — M79.605 CHRONIC PAIN OF LEFT LOWER EXTREMITY: ICD-10-CM

## 2025-02-05 DIAGNOSIS — G89.29 CHRONIC PAIN OF LEFT LOWER EXTREMITY: ICD-10-CM

## 2025-02-05 LAB
BACTERIA BLD CULT: NORMAL
T PALLIDUM AB SER QL AGGL: REACTIVE
VIT B1 SERPL-MCNC: 7 NMOL/L (ref 4–15)
VIT B6 SERPL-MCNC: 18.3 NMOL/L (ref 20–125)

## 2025-02-05 RX ORDER — OXYCODONE HYDROCHLORIDE 15 MG/1
15 TABLET ORAL EVERY 6 HOURS PRN
Qty: 110 TABLET | Refills: 0 | Status: SHIPPED | OUTPATIENT
Start: 2025-02-05 | End: 2025-03-07

## 2025-02-05 NOTE — PROGRESS NOTES
Physician Progress Note      PATIENT:               DYLON GARCIA  [facility] #:                  [id number]  :                       1965  ADMIT DATE:       2025 6:47 PM  DISCH DATE:        2025 2:09 PM  RESPONDING  PROVIDER #:        Canelo Galo MD          QUERY TEXT:    Patient admitted with syncope and bradycardia. Documentation reflects Sepsis   in the IM progress note on ; afebrile, but tachycardia and leukocytosis   meets SIRS criteria.  Possible proctitis, start ceftriaxone/Flagyl course for   7 days.   If possible, please document in the progress notes and discharge   summary if Sepsis was:    The medical record reflects the following:  Risk Factors: age, proctitis  Clinical Indicators:  lactic acidosis-2.9, wbc 9.2 then jumped to 21.2 on ,   stool studies ordered:    -c diff - neg, O&P - neg, calprotectin - pend, GI   pathogens - pend, fecal leukocytes - postive  Treatment: ceftriaxone and metronidazole, Lactic, 1L NS, blood cultures  Options provided:  -- Sepsis, not present on admission, treated and resolved  -- Sepsis ruled out after study  -- Other - I will add my own diagnosis  -- Disagree - Not applicable / Not valid  -- Disagree - Clinically unable to determine / Unknown  -- Refer to Clinical Documentation Reviewer    PROVIDER RESPONSE TEXT:    Sepsis ruled out after study.    Query created by: Jania Rodriguez on 2025 9:02 AM      Electronically signed by:  Canelo Galo MD 2025 11:28 AM          
4 Eyes Skin Assessment     NAME:  Shana Martel  YOB: 1965  MEDICAL RECORD NUMBER:  [id number]    The patient is being assessed for  Admission    I agree that at least one RN has performed a thorough Head to Toe Skin Assessment on the patient. ALL assessment sites listed below have been assessed.      Areas assessed by both nurses:    Head, Face, Ears, Shoulders, Back, Chest, Arms, Elbows, Hands, Sacrum. Buttock, Coccyx, Ischium, and Legs. Feet and Heels        Does the Patient have a Wound? No noted wound(s)       Jimenez Prevention initiated by RN: Yes  Wound Care Orders initiated by RN: No    Pressure Injury (Stage 3,4, Unstageable, DTI, NWPT, and Complex wounds) if present, place Wound referral order by RN under : No    New Ostomies, if present place, Ostomy referral order under : No     Nurse 1 eSignature: Electronically signed by Felicita Mendoza RN on 2/1/25 at 9:55 AM EST    **SHARE this note so that the co-signing nurse can place an eSignature**    Nurse 2 eSignature: Electronically signed by Kendra Neely RN on 2/1/25 at 1:42 PM EST    
Advance Directive  Patient has received [facility]'s materials regarding advance directives, Patient has completed an advance directive , and Discussed the importance of establishing and updating an advance directive. Patient does not have questions at this time  Discussed with: Patient and Family member    Pt completed HCPOA.  Pt did not want to completed a LW at this time.      POA's named:   Haroldo CHAD Burr, nephew; [phone]  Kaci Lopez, sister; [phone]  Ruth Ann Torres, grand daughter; [phone]    Copy of HCPOA in pt chart to be scanned into system upon discharge.  Pt has original plus a copy for each decision-maker named.    Chaplain Lindsey Jackson  
Bradycardia resolved.  Will sign off at this time.  Cardiology continues to follow.  Ectopy noted.  
CLINICAL PHARMACY NOTE: MEDS TO BEDS    Total # of Prescriptions Filled: 1   The following medications were delivered to the patient:  Discharge Medication List as of 2/4/2025 12:46 PM        START taking these medications    Details   metoprolol tartrate (LOPRESSOR) 25 MG tablet Take 1 tablet by mouth 2 times daily, Disp-60 tablet, R-3Normal               Additional Documentation:  Tubed to nurse  
Comprehensive Nutrition Assessment    Type and Reason for Visit:       Nutrition Recommendations/Plan:   ***     Malnutrition Assessment:  Malnutrition Status:       Context:        Findings of the 6 clinical characteristics of malnutrition:  {Malnutrition Characteristics:79142}    Nutrition Assessment:         Nutrition Related Findings:              Current Nutrition Intake & Therapies:          {Current Nutrition Therapy:67831}    Anthropometric Measures:  Height: 175.3 cm (5' 9\")  Ideal Body Weight (IBW): 145 lbs (66 kg)       Current Body Weight:  ,   IBW.    Current BMI (kg/m2):                                    Estimated Daily Nutrient Needs:        Energy (kcal/day):       Protein (g/day):       Fluid (ml/day):      Nutrition Diagnosis:   {PES Statements:47342}    Nutrition Interventions:                 Goals:             Nutrition Monitoring and Evaluation:              Discharge Planning:          DYLON CHOI RD, LD  Contact: ***    
EDSBAR report has been reviewed.      Electronically signed by Felicita Mendoza RN on 2/1/2025 at 8:18 AM      
Internal Medicine Hospital Progress Note    Patient:  Shana Martel 59 y.o. female MRN: [id number]     Date of Service: 2/3/2025    Allergy: Hydrocodone, Paroxetine hcl, Aspirin, Penicillins, and Vicodin [hydrocodone-acetaminophen]  CC: F/u:   Brief Course   Shana Martel was admitted on 1/31/2025 for Syncope and collapse. Shana Martel has been admitted for 2 days    24 hr interval hx:  Patient wants to go home today.     Objective     Physical Exam:  Vitals: BP (!) 165/94   Pulse 57   Temp 98.5 °F (36.9 °C) (Axillary)   Resp 12   Ht 1.753 m (5' 9\")   Wt 65.1 kg (143 lb 8.3 oz)   LMP 11/15/2021   SpO2 92%   BMI 21.19 kg/m²     General: Alert and oriented X3. No acute distress  Eyes: PEERL. No scleral icterus noted. Normal appearing conjunctiva bilaterally  Ears: Normal TM and external canal bilaterally.  Oral cavity/Throat: No pharyngeal erythema. No oral lesions.  Cardiovascular: Heart is regular rate and rhythm. No murmurs noted. Radial pulses 2+. Posterior tibial pulses 2+. No lower extremity edema noted  Pulmonary: Lungs clear to auscultation bilaterally  Skin: Dry, warm. No obvious skin lesions or rashes noted.  Neuro: PEERL. EOM intact. No facial droop. Normal sensation in bilateral upper and lower extremities. Gait is norm    Pertinent/ New Labs and Imaging Studies   Labs reviewed. Pertinent labs noted in assessment and plan      Assessment and Plan   Shana Martel was admitted to hospital for Syncope and collapse    Proctitis  - Continue ceftriaxone and metronidazole    Afib with RVR  - cardiology consulted  - Flecainide per cardiology    Recurrent falls  - PT/OT    Hx of CVA  - Continue statin    Hypertension  - Continue lisinopril    BEATRICE  - Continue home med          DVT Prophylaxis: Enoxaparin  Code:Full  Disposition: Pending echo and EP larry Galo MD  [facility]  Office [phone]  Cell [phone]   
Internal Medicine Hospital Progress Note    Patient:  Shana Martel 59 y.o. female MRN: [id number]     Date of Service: 2/4/2025    Allergy: Hydrocodone, Paroxetine hcl, Aspirin, Penicillins, and Vicodin [hydrocodone-acetaminophen]  CC: F/u:   Brief Course   Shana Martel was admitted on 1/31/2025 for Syncope and collapse. Shana Martel has been admitted for 3 days    24 hr interval hx:  Patient wants to go home today.     Objective     Physical Exam:  Vitals: BP (!) 165/94   Pulse 79   Temp 98.2 °F (36.8 °C) (Oral)   Resp 15   Ht 1.753 m (5' 9\")   Wt 65 kg (143 lb 4.8 oz)   LMP 11/15/2021   SpO2 94%   BMI 21.16 kg/m²     General: Alert and oriented X3. No acute distress  Eyes: PEERL. No scleral icterus noted. Normal appearing conjunctiva bilaterally  Ears: Normal TM and external canal bilaterally.  Oral cavity/Throat: No pharyngeal erythema. No oral lesions.  Cardiovascular: Heart is regular rate and rhythm. No murmurs noted. Radial pulses 2+. Posterior tibial pulses 2+. No lower extremity edema noted  Pulmonary: Lungs clear to auscultation bilaterally  Skin: Dry, warm. No obvious skin lesions or rashes noted.  Neuro: PEERL. EOM intact. No facial droop. Normal sensation in bilateral upper and lower extremities. Gait is norm    Pertinent/ New Labs and Imaging Studies   Labs reviewed. Pertinent labs noted in assessment and plan      Assessment and Plan   Shana Martel was admitted to hospital for Syncope and collapse    Proctitis  - Continue ceftriaxone and metronidazole    Afib with RVR  - cardiology consulted  - Stop flecai    Recurrent falls  - PT/OT    Hx of CVA  - Continue statin    Hypertension  - Continue lisinopril    BEATRICE  - Continue home med          DVT Prophylaxis: Enoxaparin  Code:Full  Disposition: Pending echo and EP larry Galo MD  [facility]  Office [phone]  Cell [phone]   
NAME:  Shana Martel  YOB: 1965  MEDICAL RECORD NUMBER:  [id number]    Shift Summary: Pt reports being nauseated despite receiving compazine and zofran. Had a couple emesis episodes. Pt also had about 9 watery brown dark soft stools that were sent for C-diff rule out. HR is stabilized at this time. Had one unmeasured urine occurrence, hasn't had any output in the purewick since shift change.     Family updated: No    Rhythm: Normal Sinus Rhythm     Most recent vitals:   Visit Vitals  BP (!) 171/75   Pulse 75   Temp 98.1 °F (36.7 °C) (Axillary)   Resp 18   Ht 1.753 m (5' 9\")   Wt 64.9 kg (143 lb)   SpO2 98%   BMI 21.12 kg/m²           No data found.    No data found.      Respiratory support needed (if any):  - RA    Admission weight Weight - Scale: 65 kg (143 lb 4.8 oz) (02/01/25 0430)    Today's weight    Wt Readings from Last 1 Encounters:   02/01/25 64.9 kg (143 lb)        Briceño need assessed each shift: N/A - no briceño present-purewick  UOP >30ml/hr: NO - one unmeasured urine occurrence throughout shift  Last documented BM (in last 48 hrs):  Patient Vitals for the past 48 hrs:   Last BM (including prior to admit) Stool Occurrence   02/01/25 1335 -- 0   02/01/25 1346 -- 1   02/01/25 1720 -- 1   02/01/25 1851 -- 0   02/01/25 2000 02/01/25 1   02/01/25 2200 -- 1   02/01/25 2252 -- 1   02/02/25 0100 -- 1   02/02/25 0400 -- 1   02/02/25 0430 -- 1                Restraints (in use currently or dc'd in last 12 hrs): No    Order current and documentation up to date? Yes    Lines/Drains reviewed @ bedside.  Peripheral IV 01/31/25 Right;Anterior Forearm (Active)   Number of days: 1         Drip rates at handoff:    sodium chloride         Lab Data:   CBC:   Recent Labs     02/01/25  1016 02/02/25  0415   WBC 10.5 21.2*   HGB 15.2 17.0*   HCT 44.7 49.6*   MCV 87.8 86.8    248     BMP:    Recent Labs     01/31/25  2016 02/01/25  1016    142   K 4.6 3.6   CO2 23 22   BUN 17 16   CREATININE 1.0 1.0 
NAME:  Shana Martel  YOB: 1965  MEDICAL RECORD NUMBER:  [id number]    Shift Summary: Patient amio rate decreased per cards.     Family updated: No    Rhythm: Normal Sinus Rhythm     Most recent vitals:   Visit Vitals  BP (!) 165/94   Pulse 57   Temp 98.5 °F (36.9 °C) (Axillary)   Resp 12   Ht 1.753 m (5' 9\")   Wt 65.1 kg (143 lb 8.3 oz)   SpO2 92%   BMI 21.19 kg/m²           No data found.    No data found.      Respiratory support needed (if any):  - RA    Admission weight Weight - Scale: 65 kg (143 lb 4.8 oz) (02/01/25 0430)    Today's weight    Wt Readings from Last 1 Encounters:   02/03/25 65.1 kg (143 lb 8.3 oz)        Briceño need assessed each shift: N/A - no briceño present  UOP >30ml/hr: YES  Last documented BM (in last 48 hrs):  Patient Vitals for the past 48 hrs:   Last BM (including prior to admit) Stool Occurrence   02/01/25 1335 -- 0   02/01/25 1346 -- 1   02/01/25 1720 -- 1   02/01/25 1851 -- 0   02/01/25 2000 02/01/25 1   02/01/25 2200 -- 1   02/01/25 2252 -- 1   02/02/25 0100 -- 1   02/02/25 0400 -- 1   02/02/25 0430 -- 1   02/02/25 0802 02/02/25 --                  Lines/Drains reviewed @ bedside.  Peripheral IV 02/02/25 Left;Proximal;Anterior Forearm (Active)   Number of days: 0         Drip rates at handoff:    amiodarone (CORDARONE) 450 mg in dextrose 5 % 250 mL infusion 0.5 mg/min (02/03/25 0400)    sodium chloride 25 mL/hr at 02/02/25 1323       Lab Data:   CBC:   Recent Labs     02/02/25  0415 02/03/25  0600   WBC 21.2* 18.3*   HGB 17.0* 15.5   HCT 49.6* 45.8   MCV 86.8 88.0    229     BMP:    Recent Labs     02/01/25  1016 02/02/25  0415    138   K 3.6 4.0   CO2 22 16*   BUN 16 16   CREATININE 1.0 0.9     LIVR:   Recent Labs     02/01/25  1016 02/02/25  0415   AST 20 28   ALT 6* 11     PT/INR: No results for input(s): \"INR\" in the last 72 hours.    Invalid input(s): \"PROT\"  APTT: No results for input(s): \"APTT\" in the last 72 hours.  ABG: No results for input(s): 
NAME:  Shana Martel  YOB: 1965  MEDICAL RECORD NUMBER:  [id number]    Shift Summary: Transfer from PCU for bradycardia - HR will drop to mid 30s but patient is asymptomatic. Cardiology consulted; will hold off on external pacing because they believe bradycardia is d/t high doses of patient's home metroprolol and clonidine (both held/discontinued).     Family updated: Yes:  Niece and nephew at bedside.    Rhythm: Sinus jeffy with ventricular bigeminy    Most recent vitals:   Visit Vitals  /60   Pulse 51   Temp 97.9 °F (36.6 °C) (Oral)   Resp 14   Ht 1.753 m (5' 9\")   Wt 64.9 kg (143 lb)   SpO2 (!) 85%   BMI 21.12 kg/m²           Respiratory support needed (if any):  - RA    Admission weight Weight - Scale: 65 kg (143 lb 4.8 oz) (02/01/25 0430)    Today's weight    Wt Readings from Last 1 Encounters:   02/01/25 64.9 kg (143 lb)        Briceño need assessed each shift: N/A - no briceño present  UOP >30ml/hr: NO - patient has not urinated this shift; purewick in place.  Last documented BM (in last 48 hrs):  Patient Vitals for the past 48 hrs:   Stool Occurrence   02/01/25 1335 0   02/01/25 1346 1   02/01/25 1720 1   02/01/25 1851 0                Restraints (in use currently or dc'd in last 12 hrs): No    Order current and documentation up to date? N/A    Lines/Drains reviewed @ bedside.  Peripheral IV 01/31/25 Right;Anterior Forearm (Active)   Number of days: 0         Drip rates at handoff:    sodium chloride         Lab Data:   CBC:   Recent Labs     01/31/25 2016 02/01/25  1016   WBC 9.2 10.5   HGB 15.5 15.2   HCT 45.5 44.7   MCV 88.4 87.8    255     BMP:    Recent Labs     01/31/25 2016 02/01/25  1016    142   K 4.6 3.6   CO2 23 22   BUN 17 16   CREATININE 1.0 1.0     LIVR:   Recent Labs     01/31/25 2016 02/01/25  1016   AST 23 20   ALT 10 6*     PT/INR: No results for input(s): \"INR\" in the last 72 hours.    Invalid input(s): \"PROT\"  APTT: No results for input(s): \"APTT\" in the 
NAME:  Shana Martel  YOB: 1965  MEDICAL RECORD NUMBER:  [id number]    Shift Summary: Uneventful night. PRN meds for pain.    Family updated: No    Rhythm: Normal Sinus Rhythm     Most recent vitals:   Visit Vitals  /86   Pulse 82   Temp 97.4 °F (36.3 °C) (Axillary)   Resp 12   Ht 1.753 m (5' 9\")   Wt 65.1 kg (143 lb 8.3 oz)   SpO2 93%   BMI 21.19 kg/m²           No data found.    No data found.      Respiratory support needed (if any):  - RA    Admission weight Weight - Scale: 65 kg (143 lb 4.8 oz) (02/01/25 0430)    Today's weight    Wt Readings from Last 1 Encounters:   02/03/25 65.1 kg (143 lb 8.3 oz)        Briceño need assessed each shift: N/A - no briceño present  UOP >30ml/hr: YES  Last documented BM (in last 48 hrs):  Patient Vitals for the past 48 hrs:   Last BM (including prior to admit) Stool Occurrence   02/02/25 0100 -- 1   02/02/25 0400 -- 1   02/02/25 0430 -- 1   02/02/25 0802 02/02/25 --   02/03/25 0833 02/03/25 --   02/03/25 2100 02/03/25 --                Restraints (in use currently or dc'd in last 12 hrs): No    Order current and documentation up to date? Yes    Lines/Drains reviewed @ bedside.  Peripheral IV 02/02/25 Left;Proximal;Anterior Forearm (Active)   Number of days: 1         Drip rates at handoff:    sodium chloride 25 mL/hr at 02/02/25 1323       Lab Data:   CBC:   Recent Labs     02/02/25  0415 02/03/25  0600   WBC 21.2* 18.3*   HGB 17.0* 15.5   HCT 49.6* 45.8   MCV 86.8 88.0    229     BMP:    Recent Labs     02/02/25  0415 02/03/25  0558    142   K 4.0 3.3*   CO2 16* 25   BUN 16 17   CREATININE 0.9 0.8     LIVR:   Recent Labs     02/02/25  0415 02/03/25  0558   AST 28 21   ALT 11 7*     PT/INR: No results for input(s): \"INR\" in the last 72 hours.    Invalid input(s): \"PROT\"  APTT: No results for input(s): \"APTT\" in the last 72 hours.  ABG: No results for input(s): \"PHART\", \"KXB5OET\", \"PO2ART\" in the last 72 hours.    Any consults during the shift? 
NAME:  Shana Martel  YOB: 1965  MEDICAL RECORD NUMBER:  [id number]    Shift Summary: Amio gtt stopped. Neurosurgery cleared patient to work with PT/OT. Pt up in chair for several hours. Echo completed. Pt downgraded to PCU.    Family updated: Yes:  nephew (Haroldo)    Rhythm: Normal Sinus Rhythm     Most recent vitals:   Visit Vitals  /71   Pulse 52   Temp 98.6 °F (37 °C) (Oral)   Resp 13   Ht 1.753 m (5' 9\")   Wt 65.1 kg (143 lb 8.3 oz)   SpO2 90%   BMI 21.19 kg/m²           No data found.    No data found.      Respiratory support needed (if any):  - RA    Admission weight Weight - Scale: 65 kg (143 lb 4.8 oz) (02/01/25 0430)    Today's weight    Wt Readings from Last 1 Encounters:   02/03/25 65.1 kg (143 lb 8.3 oz)        Briceño need assessed each shift: N/A - no briceño present  UOP >30ml/hr: YES  Last documented BM (in last 48 hrs):  Patient Vitals for the past 48 hrs:   Last BM (including prior to admit) Stool Occurrence   02/01/25 1851 -- 0   02/01/25 2000 02/01/25 1   02/01/25 2200 -- 1   02/01/25 2252 -- 1   02/02/25 0100 -- 1   02/02/25 0400 -- 1   02/02/25 0430 -- 1   02/02/25 0802 02/02/25 --   02/03/25 0833 02/03/25 --                Restraints (in use currently or dc'd in last 12 hrs): No    Order current and documentation up to date? No    Lines/Drains reviewed @ bedside.  Peripheral IV 02/02/25 Left;Proximal;Anterior Forearm (Active)   Number of days: 1         Drip rates at handoff:    sodium chloride 25 mL/hr at 02/02/25 1323       Lab Data:   CBC:   Recent Labs     02/02/25  0415 02/03/25  0600   WBC 21.2* 18.3*   HGB 17.0* 15.5   HCT 49.6* 45.8   MCV 86.8 88.0    229     BMP:    Recent Labs     02/02/25  0415 02/03/25  0558    142   K 4.0 3.3*   CO2 16* 25   BUN 16 17   CREATININE 0.9 0.8     LIVR:   Recent Labs     02/02/25  0415 02/03/25  0558   AST 28 21   ALT 11 7*     PT/INR: No results for input(s): \"INR\" in the last 72 hours.    Invalid input(s): 
Occupational Therapy      OT referral received and chart reviewed. PT spoke with nursing who reports ongoing issues with vomiting and diarrhea; requests hold OT Eval today. Will follow-up tomorrow as appropriate.     Electronically signed by Pam Lovelace OT on 2/2/2025 at 10:36 AM    
Occupational Therapy  Facility/Department: [address] ICU  Occupational Therapy Initial Assessment    Name: Shana Martel  : 1965  MRN: [id number]  Date of Service: 2/3/2025    Discharge Recommendations:  Home with assist PRN  OT Equipment Recommendations  Equipment Needed: No  Shana Martel scored a 17/24 on the AM-PAC ADL Inpatient form.  At this time, no further OT is recommended upon discharge due to family support and pt denying home needs.  Recommend patient returns to prior setting with prior services.        Patient Diagnosis(es): The primary encounter diagnosis was Bradycardia. Diagnoses of Recurrent falls, Other closed fracture of first lumbar vertebra, initial encounter ([facility]), and Syncope, unspecified syncope type were also pertinent to this visit.  Past Medical History:  has a past medical history of Anxiety, Arthritis, Asthma, Hypertension, and Neuropathy.  Past Surgical History:  has a past surgical history that includes  section; Tonsillectomy; and Shoulder arthroscopy (Right, 10/28/15).    Treatment Diagnosis: decreased fxl transfers/mob and ADL status      Assessment  Performance deficits / Impairments: Decreased functional mobility ;Decreased ADL status;Decreased strength;Decreased balance;Decreased coordination  Assessment: Pt is a 59 y.o. D admitted w/ syncope and collapse at home. Imaging shows fx R transverse process L1. Ortho and neurosrugery consulted. RN spoke with Neurosurg MD. Per MD, No brace or surg needed. PTA, pt is from home where she requires some assist w/ self care and is Ind w/ fxl mob. Today- she is functioning below her baseline as she required Min A bed mob, SBA to/from stedy, total A bed>chair via stedy for initial safe mob, Mod A LB dressing, and  CGA for fxl transfers/mob w/o device. Pt w/ noted L residual deficits from prior CVA, LUE non functional. Pt is anticipated to require up to Min-Mod A for full ADLs. Pt reporting adequate assistance at home and denies 
Occupational Therapy  Shana DAPHNEY Delonte  [id number]  [id number]    OT orders received, chart reviewed. Pt with L1 transverse process fx after fall at home, Orthopedics consulted. Will hold OT evaluation pending ortho recommendations/POC.     Avani Bravo, OTR/L [id number]    
Patient HR sustained 40-50s. OBDULIO Leon made aware. Order to decrease amio dose. See MAR for admin details.   
Patient transferred to room 2108 from PCU for bradycardia. Upon assessment, patient will occasionally jeffy to the mid 30s but fairly quickly, improve to the 50-60s with frequent PVCs. Patient complaining of N/V, for which she was medicated. Patient with very flat affect and not very cooperative - she was insisting that she get up to the bathroom, but educated patient about heart rate being so low that it was unsafe to do so at this time. Dr. Knott at bedside who stated that we do not need to pace patient. Occasionally unable to obtain automatic BP d/t bradycardia so had to obtain manual BP.    Electronically signed by Kendra Neely RN on 2/1/2025 at 2:51 PM    
Physical Therapy  Facility/Department: [address] ICU  Physical Therapy Initial Assessment    Name: Shana Martel  : 1965  MRN: [id number]  Date of Service: 2/3/2025    Discharge Recommendations:  Continue to assess pending progress, Home with assist PRN   PT Equipment Recommendations  Other: None anticipated at this time.      Shana Martel scored a 18/24 on the AM-PAC short mobility form.  At this time, no further PT is recommended upon discharge.  Assessment  Body Structures, Functions, Activity Limitations Requiring Skilled Therapeutic Intervention: Decreased functional mobility ;Decreased strength;Decreased balance  Assessment: 58 y/o female admit  with Syncope/Collapse, HAs, Vomiting and Diarrhea. CT Head/C-Spine : negative. CT L Spine : Subacute appear Fx R Transverse Process L1. Per nursing/note (23) : RN spoke with Neurosurg MD. Per MD, No brace or surg needed. PMH as noted including CVA (L Residual Weak), OA, Seizure D/O, R Shld Surg, L Ankle Fx/Surg.  PTA pt living with family in house with 1 step to enter and 1st floor bed/bath; family provides assist as needed with daily care (in/out shower, dressing) and pt amb with Cane without assist. Currently pt able to move to eob with Min assist; Transfer/Amb short distances within hospital room setting with handheld/CGA.  Residual deficits L UE>LE from previous CVA.  Pt denies any d/c concerns for return home; denies any Home PT needs.  Therapy Prognosis: Fair;Good  Decision Making: Medium Complexity  History: 58 y/o female admit  with Syncope/Collapse, HAs, Vomiting and Diarrhea. CT Head/C-Spine : negative. CT L Spine : Subacute appear Fx R Transverse Process L1. Per nursing/note (2/3) : RN spoke with Neurosurg MD. Per MD, No brace or surg needed. PMH as noted including CVA (L Residual Weak), OA, Seizure D/O, R Shld Surg, L Ankle Fx/Surg.  Exam: See above.  Clinical Presentation: See above.  Barriers to Learning: None  Requires PT 
Physical Therapy  PT referral received and chart reviewed.  Ortho consult for L1 R Tranverse Process Fx.  Spoke with nursing and Ortho NP.  PT to hold Eval at this time. Jessica Huffman, PT    
Physical Therapy  PT referral received and chart reviewed.  Spoke with nursing who reports ongoing issues with vomiting and diarrhea; requests hold PT Eval today.  Will follow-up tomorrow as appropriate.  Jessica Huffman, PT    
Progress Note  [facility] PROGRESS    Patient: SHANA GARCIA  :  1965  PCP: Canelo Galo MD    Reason for visit: recurrent falls    SUBJECTIVE     History of present illness:  Shana Garcia is a 59 y.o. female with a history of BEATRICE, osteoarthritis, asthma, HTN, peripheral neuropathy, HLD, overactive bladder, migraines, hx of CVA w/L sided residual weakness, and sz DO who presents to the hospital for recurrent falls. Pt started having a severe migraine yesterday AM. This is not abnormal for the pt. She has a hx of these. Was causing her decreased appetite and nausea. She took two doses of her maxalt without relief. Started having emesis. Then she started have acute worsening of her chronic back and neck pain and decided she needed to go to ER. Pt did have a fall yesterday as well. However, this has been a chronic ongoing issue for her w/o clear etiology. She has been working with her PCP on this. Her fall yesterday day she did hit the L side of her face. She does not describe this fall as any different then her usual falls. She feels like she just loses her balance and falls. Denies any prodromal symptoms. Denies any chest pain, SOB, or palpitations. She denies any LOC or sz like activity.     Last 24hr: pt transferred to ICU for c/f symptomatic bradycardia. Overnight pt w/worsening N/V as well as sig watery diarrhea.    Pt more somnolent this am. She still has HA. Still having N/V and a lot of diarrhea. Denies any abdominal pain. Denies any chest pain, SOB, or palpitations. During interview this am, pt spontaneously went into sustained AFIB w/RVR. At bedside with nurse and cardio during this event. Pt seemingly asymptomatic. EKG obtained.    Review of systems: as above    Past Medical History:   Diagnosis Date    Anxiety     Arthritis     Asthma     Hypertension     Neuropathy      Past Surgical History:   Procedure Laterality Date     SECTION      SHOULDER 
Pt admitted to bed 5110. Vitals stable. Tele monitor verified. Call light in reach. Pt denies any needs. Care ongoing.    Electronically signed by Felicita Mendoza RN on 2/1/2025 at 9:56 AM    
Pt discharged to home. All LDAs removed. AVS reviewed with Pt and her nephew, Haroldo, all questions answered. 30 day event monitor on patient at discharge and meds to bed delivered. All belongings are with Pt.   
Pt transferred to ICU. Report given to Honor RN. Family (Aleshia) notified of ICU transfer. All belongings brought with pt.    Electronically signed by Felicita Mendoza RN on 2/1/2025 at 12:46 PM    
Pt went into AFIB RVR, dr. Mera and dr. Knott in the room and aware, New orders obtained.   
RN spoke with Neurosurgery MD. Per MD, no brace or surgery needed.    Electronically signed by Jessica L Kerley, RN on 2/3/2025 at 1:42 PM    
[facility]  Cardiology Consult Note        CC:      Asymptomatic bradycardia             HPI:   This is a 59 y.o. female came to the ER for recurrent falls.  According to the primary care physician's note she has been having severe migraines yesterday which is l not unusual for the patient.  Also decreased appetite and nausea.  He had to come to the ER because of worsening of her chronic back and neck pain    We are asked to see her for bradycardia.  Heart rate in the ER was 37    Patient on clonidine 0.3 mg twice daily, Toprol-XL on a 50 mg    Interval history  The patient bradycardia had resolved on holding the clonidine and beta-blocker  Blood pressure is also coming up  The patient was returning from CT when we noticed that she had new onset A-fib flutter at a rate of 170  This is a new finding  She had gone for a CT or headache and nausea      Past Medical History:   Diagnosis Date    Anxiety     Arthritis     Asthma     Hypertension     Neuropathy       Past Surgical History:   Procedure Laterality Date     SECTION      SHOULDER ARTHROSCOPY Right 10/28/15    labral debridement open shanna & decompression    TONSILLECTOMY        Family History   Problem Relation Age of Onset    Cancer Mother     Diabetes Mother     Heart Disease Father     Hypertension Father     Seizures Brother       Social History     Tobacco Use    Smoking status: Never    Smokeless tobacco: Never   Vaping Use    Vaping status: Never Used   Substance Use Topics    Alcohol use: No     Alcohol/week: 0.0 standard drinks of alcohol    Drug use: No      Allergies   Allergen Reactions    Hydrocodone Nausea And Vomiting and Seizure     seizures    Paroxetine Hcl Seizure     Seizures    Aspirin Nausea And Vomiting    Penicillins Nausea And Vomiting    Vicodin [Hydrocodone-Acetaminophen] Nausea Only      lisinopril  20 mg Oral Daily    metoprolol tartrate  25 mg Oral BID    cefTRIAXone (ROCEPHIN) IV  1,000 mg IntraVENous Q24H    
in the last 72 hours.  ABG: No results for input(s): \"PHART\", \"TZX3DBM\", \"PO2ART\" in the last 72 hours.    Any consults during the shift? No    Any signed and held orders to be released?  No        4 Eyes Skin Assessment       The patient is being assessed for  Shift Handoff    I agree that at least one RN has performed a thorough Head to Toe Skin Assessment on the patient. ALL assessment sites listed below have been assessed.      Areas assessed by both nurses: Head, Face, Ears, Shoulders, Back, Chest, Arms, Elbows, Hands, Sacrum. Buttock, Coccyx, Ischium, Legs. Feet and Heels, and Under Medical Devices         Does the Patient have a Wound? No noted wound(s)    Wound Care Orders initiated by RN: No       Jimenez Prevention initiated by RN: Yes    Pressure Injury (Stage 3,4, Unstageable, DTI, NWPT, and Complex wounds) if present, place Wound referral order by RN under : No    New Ostomies, if present place, Ostomy referral order under : No     Nurse 1 eSignature: Electronically signed by Lesia Dotson RN on 2/2/25 at 9:12 PM EST    **SHARE this note so that the co-signing nurse can place an eSignature**    Nurse 2 eSignature: Electronically signed by MUSTAPHA GONZALEZ RN on 2/3/25 at 12:44 AM EST          
RWMA    Interatrial septal anuerysm with no evidence for an atrial septal defect using color doppler.     Moderate tricuspid regurgitation.       ASSESSMENT AND PLAN:      Supraventricular tachycardia/flutter with RVR  New onset.  Happened literally in front of her eyes for the first time  Will control with amiodarone bolus and infusion  Start low-dose short acting beta-blocker  Yesterday had marked bradycardia  At the remote history of stroke  Hold anticoagulation for now until results of CT are available  Repeat echo...... last one was in 2021    Hypertension  Blood pressure starting to creep up  I agree with lisinopril  Would use chlorthalidone and spironolactone if additional medicines needed        KAYKAY Knott M.D  2/2/2025

## 2025-02-06 LAB
BACTERIA BLD CULT ORG #2: NORMAL
BACTERIA BLD CULT: NORMAL
CALPROTECTIN STL-MCNT: 391 UG/G

## 2025-02-07 ENCOUNTER — TELEPHONE (OUTPATIENT)
Dept: CARDIOLOGY CLINIC | Age: 60
End: 2025-02-07

## 2025-02-07 NOTE — TELEPHONE ENCOUNTER
Received event report from  dated 2/7/25. Report captured sinus bradycardia lasting 7 minutes 58 seconds at 33-35 bpm. Event captured at 2:55 am. Called PT and spoke with POA on file (Haroldo Cuellar). POA advised that PT has had some recent medication changes. Around the time of event, PT was asleep. Do we have any recommendations for this PT? Scanned report to PT's chart.    Haroldo callback number [phone]

## 2025-02-07 NOTE — TELEPHONE ENCOUNTER
Dr. Jacobson,    Please see urgent report. Nocturnal bradycardia, patient  sleeping at time of bradycardic event.    Thanks,  Angelia Nicole RN

## 2025-02-11 NOTE — TELEPHONE ENCOUNTER
Eda, pt's family member called the office stating that pt was doing great, pt does not have updated HIPAA form, requested for Shana to return call to office

## 2025-02-11 NOTE — TELEPHONE ENCOUNTER
Melissa at [facility]   Patient is showing Bradycardia 32-35 lasting 3 mins and 8 secs it was 8:50 AM this morning    Tried calling patient LMTC

## 2025-02-11 NOTE — PROGRESS NOTES
Shana Martel is a 59 y.o. female with a past medical history noted below who presents for routine follow up on chronic conditions and discussion of preventative health care.  Chief Complaint   Patient presents with    Follow-up     Want to talk about the heart monitor             Past Medical History:   Diagnosis Date    Anxiety     Arthritis     Asthma     Hypertension     Neuropathy      Patient Active Problem List   Diagnosis    Acromioclavicular joint arthritis    Rotator cuff tendonitis    Adhesive capsulitis    S/P arthroscopy of shoulder    Sprain of left knee    Sprain of left ankle    Moderate episode of recurrent major depressive disorder (HCC)    Hypertension    Degenerative disc disease, lumbar    Conjunctivitis    Left foot pain    Weakness    Syncope and collapse    Bradycardia    Closed fracture of first lumbar vertebra (HCC)    Recurrent falls    Dysequilibrium    Pulmonary nodule    History of CVA (cerebrovascular accident)    Hyperlipidemia    Peripheral polyneuropathy    Seizure disorder (HCC)    OAB (overactive bladder)    BEATRICE (generalized anxiety disorder)    Intractable migraine without status migrainosus    Benign essential HTN    Left-sided weakness    Atrial flutter with rapid ventricular response (HCC)    Sepsis without acute organ dysfunction (HCC)    Proctitis    Lactic acidosis    Atrial fibrillation with rapid ventricular response (HCC)       Vitals:    02/12/25 1349   BP: 122/70   Site: Left Upper Arm   Position: Sitting   Cuff Size: Medium Adult   Pulse: (!) 44   SpO2: 98%   Weight: 64.4 kg (142 lb)     General: Alert and oriented X3. No acute distress  Eyes: PEERL. No scleral icterus noted. Normal appearing conjunctiva bilaterally  Ears: Normal TM and external canal bilaterally.  Oral cavity/Throat: No pharyngeal erythema. No oral lesions.  Cardiovascular: Heart is regular rate and rhythm. No murmurs noted. Radial pulses 2+. Posterior tibial pulses 2+. No lower extremity edema

## 2025-02-12 ENCOUNTER — OFFICE VISIT (OUTPATIENT)
Dept: INTERNAL MEDICINE CLINIC | Age: 60
End: 2025-02-12

## 2025-02-12 VITALS
BODY MASS INDEX: 20.97 KG/M2 | SYSTOLIC BLOOD PRESSURE: 122 MMHG | OXYGEN SATURATION: 98 % | WEIGHT: 142 LBS | DIASTOLIC BLOOD PRESSURE: 70 MMHG | HEART RATE: 44 BPM

## 2025-02-12 DIAGNOSIS — F41.1 GAD (GENERALIZED ANXIETY DISORDER): ICD-10-CM

## 2025-02-12 DIAGNOSIS — I48.91 ATRIAL FIBRILLATION WITH RAPID VENTRICULAR RESPONSE (HCC): Primary | ICD-10-CM

## 2025-02-12 DIAGNOSIS — R29.6 RECURRENT FALLS: ICD-10-CM

## 2025-02-13 NOTE — TELEPHONE ENCOUNTER
Received 2 event reports from [facility]:    2/12/25, 11:53 pm: Sinus bradycardia at 33-35 bpm for 30 seconds.    2/13/25, 4:18 am: Sinus bradycardia at 33-35 bpm for 30 to 32 seconds.    Scanned reports to PT's chart. Called/spoke to Haroldo (POA on file). He advised that PT was asleep during both events. Do we have any recommendations for this PT?     Haroldo's callback number [phone].

## 2025-02-14 ENCOUNTER — TELEPHONE (OUTPATIENT)
Dept: CARDIOLOGY CLINIC | Age: 60
End: 2025-02-14

## 2025-02-14 NOTE — TELEPHONE ENCOUNTER
Armando from [employer] called in he states he has a notification from patients monitor.    He states he will send the notification message over.    He can be reached at [phone].

## 2025-02-14 NOTE — TELEPHONE ENCOUNTER
Received event report from  dated 2/14/25, 9:07 am. Report captured sinus bradycardia with 3 episodes lasting 16 seconds to 7 minutes 47 seconds at 35 bpm. Scanned report to PT's chart.

## 2025-02-24 RX ORDER — GABAPENTIN 300 MG/1
300 CAPSULE ORAL 3 TIMES DAILY
Qty: 90 CAPSULE | Refills: 5 | Status: SHIPPED | OUTPATIENT
Start: 2025-02-24 | End: 2025-08-23

## 2025-02-28 NOTE — TELEPHONE ENCOUNTER
Discharge instructions reviewed with pt and with spouse and they both stated understanding  Medication:   Requested Prescriptions     Pending Prescriptions Disp Refills    metoprolol tartrate (LOPRESSOR) 25 MG tablet 60 tablet 3     Sig: Take 1 tablet by mouth 2 times daily       Last Filled:      Patient Phone Number: [phone] (home)     Last appt: Visit date not found   Next appt: Visit date not found  No future appointments.   Not due until 3/7/24

## 2025-03-03 RX ORDER — METOPROLOL TARTRATE 25 MG/1
25 TABLET, FILM COATED ORAL 2 TIMES DAILY
Qty: 60 TABLET | Refills: 3 | Status: SHIPPED | OUTPATIENT
Start: 2025-03-03

## 2025-03-06 DIAGNOSIS — M79.605 CHRONIC PAIN OF LEFT LOWER EXTREMITY: ICD-10-CM

## 2025-03-06 DIAGNOSIS — G89.29 CHRONIC PAIN OF LEFT LOWER EXTREMITY: ICD-10-CM

## 2025-03-06 RX ORDER — OXYCODONE HYDROCHLORIDE 15 MG/1
15 TABLET ORAL EVERY 6 HOURS PRN
Qty: 110 TABLET | Refills: 0 | Status: SHIPPED | OUTPATIENT
Start: 2025-03-06 | End: 2025-04-05

## 2025-03-06 NOTE — TELEPHONE ENCOUNTER
Medication:   Requested Prescriptions     Pending Prescriptions Disp Refills    oxyCODONE (OXY-IR) 15 MG immediate release tablet 110 tablet 0     Sig: Take 1 tablet by mouth every 6 hours as needed for Pain for up to 30 days. Max Daily Amount: 60 mg       Last Filled:      Patient Phone Number: [phone] (home)     Last appt: 2/12/2025   Next appt: 3/20/2025  Future Appointments   Date Time Provider Department Center   3/20/2025 11:30 AM Canelo Galo MD [facility] ECC DEP

## 2025-03-14 ENCOUNTER — RESULTS FOLLOW-UP (OUTPATIENT)
Dept: ICU | Age: 60
End: 2025-03-14

## 2025-03-20 ENCOUNTER — OFFICE VISIT (OUTPATIENT)
Dept: INTERNAL MEDICINE CLINIC | Age: 60
End: 2025-03-20
Payer: MEDICARE

## 2025-03-20 VITALS
SYSTOLIC BLOOD PRESSURE: 130 MMHG | BODY MASS INDEX: 21.27 KG/M2 | WEIGHT: 144 LBS | DIASTOLIC BLOOD PRESSURE: 70 MMHG | OXYGEN SATURATION: 95 % | HEART RATE: 49 BPM

## 2025-03-20 DIAGNOSIS — I48.91 ATRIAL FIBRILLATION WITH RAPID VENTRICULAR RESPONSE (HCC): Primary | ICD-10-CM

## 2025-03-20 DIAGNOSIS — M51.369 DEGENERATION OF INTERVERTEBRAL DISC OF LUMBAR REGION, UNSPECIFIED WHETHER PAIN PRESENT: ICD-10-CM

## 2025-03-20 DIAGNOSIS — I10 PRIMARY HYPERTENSION: ICD-10-CM

## 2025-03-20 PROCEDURE — G8427 DOCREV CUR MEDS BY ELIG CLIN: HCPCS | Performed by: STUDENT IN AN ORGANIZED HEALTH CARE EDUCATION/TRAINING PROGRAM

## 2025-03-20 PROCEDURE — 1036F TOBACCO NON-USER: CPT | Performed by: STUDENT IN AN ORGANIZED HEALTH CARE EDUCATION/TRAINING PROGRAM

## 2025-03-20 PROCEDURE — 3078F DIAST BP <80 MM HG: CPT | Performed by: STUDENT IN AN ORGANIZED HEALTH CARE EDUCATION/TRAINING PROGRAM

## 2025-03-20 PROCEDURE — 3075F SYST BP GE 130 - 139MM HG: CPT | Performed by: STUDENT IN AN ORGANIZED HEALTH CARE EDUCATION/TRAINING PROGRAM

## 2025-03-20 PROCEDURE — 99213 OFFICE O/P EST LOW 20 MIN: CPT | Performed by: STUDENT IN AN ORGANIZED HEALTH CARE EDUCATION/TRAINING PROGRAM

## 2025-03-20 PROCEDURE — G8420 CALC BMI NORM PARAMETERS: HCPCS | Performed by: STUDENT IN AN ORGANIZED HEALTH CARE EDUCATION/TRAINING PROGRAM

## 2025-03-20 PROCEDURE — 3017F COLORECTAL CA SCREEN DOC REV: CPT | Performed by: STUDENT IN AN ORGANIZED HEALTH CARE EDUCATION/TRAINING PROGRAM

## 2025-03-20 RX ORDER — KETOROLAC TROMETHAMINE 30 MG/ML
30 INJECTION, SOLUTION INTRAMUSCULAR; INTRAVENOUS ONCE
Status: SHIPPED | OUTPATIENT
Start: 2025-03-20 | End: 2025-03-25

## 2025-03-20 RX ORDER — OMEPRAZOLE 20 MG/1
CAPSULE, DELAYED RELEASE ORAL
COMMUNITY

## 2025-03-20 NOTE — PROGRESS NOTES
Shana Martel is a 59 y.o. female with a past medical history noted below who presents for routine follow up on chronic conditions and discussion of preventative health care.  Chief Complaint   Patient presents with    Back Pain     Having some back pain ever since she was dropped getting her MRI done.            Past Medical History:   Diagnosis Date    Anxiety     Arthritis     Asthma     Hypertension     Neuropathy      Patient Active Problem List   Diagnosis    Acromioclavicular joint arthritis    Rotator cuff tendonitis    Adhesive capsulitis    S/P arthroscopy of shoulder    Sprain of left knee    Sprain of left ankle    Moderate episode of recurrent major depressive disorder (HCC)    Hypertension    Degenerative disc disease, lumbar    Conjunctivitis    Left foot pain    Weakness    Syncope and collapse    Bradycardia    Closed fracture of first lumbar vertebra (HCC)    Recurrent falls    Dysequilibrium    Pulmonary nodule    History of CVA (cerebrovascular accident)    Hyperlipidemia    Peripheral polyneuropathy    Seizure disorder (HCC)    OAB (overactive bladder)    BEATRICE (generalized anxiety disorder)    Intractable migraine without status migrainosus    Benign essential HTN    Left-sided weakness    Atrial flutter with rapid ventricular response (HCC)    Sepsis without acute organ dysfunction (HCC)    Proctitis    Lactic acidosis    Atrial fibrillation with rapid ventricular response (HCC)       Vitals:    03/20/25 1147   BP: 130/70   Pulse: (!) 49   SpO2: 95%   Weight: 65.3 kg (144 lb)     General: Alert and oriented X3. No acute distress  Eyes: PEERL. No scleral icterus noted. Normal appearing conjunctiva bilaterally  Ears: Normal TM and external canal bilaterally.  Oral cavity/Throat: No pharyngeal erythema. No oral lesions.  Cardiovascular: Heart is regular rate and rhythm. No murmurs noted. Radial pulses 2+. Posterior tibial pulses 2+. No lower extremity edema noted  Pulmonary: Lungs clear to

## 2025-03-25 DIAGNOSIS — G43.109 MIGRAINE WITH AURA AND WITHOUT STATUS MIGRAINOSUS, NOT INTRACTABLE: ICD-10-CM

## 2025-03-25 NOTE — TELEPHONE ENCOUNTER
Future Appointments   Date Time Provider Department Center   6/20/2025  2:45 PM Canelo Galo MD [facility] BS ECC DEP       Last appt 3/20/25

## 2025-03-25 NOTE — TELEPHONE ENCOUNTER
Pt is requesting refills for:    gabapentin (NEURONTIN) 300 MG capsule     rizatriptan (MAXALT) 10 MG tablet     Please send to [facility] on [address]    Thank you

## 2025-03-26 RX ORDER — GABAPENTIN 300 MG/1
300 CAPSULE ORAL 3 TIMES DAILY
Qty: 90 CAPSULE | Refills: 5 | Status: SHIPPED | OUTPATIENT
Start: 2025-03-26 | End: 2025-09-22

## 2025-03-26 RX ORDER — RIZATRIPTAN BENZOATE 10 MG/1
10 TABLET ORAL DAILY PRN
Qty: 12 TABLET | Refills: 5 | Status: SHIPPED | OUTPATIENT
Start: 2025-03-26

## 2025-04-04 DIAGNOSIS — M79.605 CHRONIC PAIN OF LEFT LOWER EXTREMITY: ICD-10-CM

## 2025-04-04 DIAGNOSIS — G89.29 CHRONIC PAIN OF LEFT LOWER EXTREMITY: ICD-10-CM

## 2025-04-04 NOTE — TELEPHONE ENCOUNTER
Future Appointments   Date Time Provider Department Center   6/20/2025  2:45 PM Canelo Galo MD [facility] BS ECC DEP       Last appt 3/20/25

## 2025-04-05 ENCOUNTER — TELEPHONE (OUTPATIENT)
Dept: PRIMARY CARE CLINIC | Age: 60
End: 2025-04-05

## 2025-04-05 DIAGNOSIS — M79.605 CHRONIC PAIN OF LEFT LOWER EXTREMITY: ICD-10-CM

## 2025-04-05 DIAGNOSIS — G89.29 CHRONIC PAIN OF LEFT LOWER EXTREMITY: ICD-10-CM
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
## 2025-04-06 ENCOUNTER — TELEPHONE (OUTPATIENT)
Dept: FAMILY MEDICINE CLINIC | Age: 60
End: 2025-04-06

## 2025-04-06 ENCOUNTER — HOSPITAL ENCOUNTER (EMERGENCY)
Age: 60
Discharge: HOME | End: 2025-04-06
Payer: MEDICARE

## 2025-04-06 VITALS
RESPIRATION RATE: 18 BRPM | BODY MASS INDEX: 22.07 KG/M2 | SYSTOLIC BLOOD PRESSURE: 158 MMHG | HEART RATE: 63 BPM | TEMPERATURE: 98 F | OXYGEN SATURATION: 98 % | HEIGHT: 69 IN | WEIGHT: 149.03 LBS | DIASTOLIC BLOOD PRESSURE: 87 MMHG

## 2025-04-06 DIAGNOSIS — G89.29 CHRONIC NECK PAIN: ICD-10-CM

## 2025-04-06 DIAGNOSIS — R51.9 ACUTE NONINTRACTABLE HEADACHE, UNSPECIFIED HEADACHE TYPE: ICD-10-CM

## 2025-04-06 DIAGNOSIS — N30.00 ACUTE CYSTITIS WITHOUT HEMATURIA: ICD-10-CM

## 2025-04-06 DIAGNOSIS — G89.29 ACUTE EXACERBATION OF CHRONIC LOW BACK PAIN: Primary | ICD-10-CM

## 2025-04-06 DIAGNOSIS — M54.50 ACUTE EXACERBATION OF CHRONIC LOW BACK PAIN: Primary | ICD-10-CM

## 2025-04-06 DIAGNOSIS — M54.6 CHRONIC THORACIC BACK PAIN, UNSPECIFIED BACK PAIN LATERALITY: ICD-10-CM

## 2025-04-06 DIAGNOSIS — G89.29 CHRONIC THORACIC BACK PAIN, UNSPECIFIED BACK PAIN LATERALITY: ICD-10-CM

## 2025-04-06 DIAGNOSIS — M54.2 CHRONIC NECK PAIN: ICD-10-CM

## 2025-04-06 LAB
BACTERIA URNS QL MICRO: ABNORMAL /HPF
BILIRUB UR QL STRIP.AUTO: NEGATIVE
CLARITY UR: ABNORMAL
COLOR UR: YELLOW
EPI CELLS #/AREA URNS AUTO: 2 /HPF (ref 0–5)
GLUCOSE UR STRIP.AUTO-MCNC: NEGATIVE MG/DL
HGB UR QL STRIP.AUTO: NEGATIVE
HYALINE CASTS #/AREA URNS AUTO: 0 /LPF (ref 0–8)
KETONES UR STRIP.AUTO-MCNC: NEGATIVE MG/DL
LEUKOCYTE ESTERASE UR QL STRIP.AUTO: ABNORMAL
NITRITE UR QL STRIP.AUTO: NEGATIVE
PH UR STRIP.AUTO: 5.5 [PH] (ref 5–8)
PROT UR STRIP.AUTO-MCNC: NEGATIVE MG/DL
RBC CLUMPS #/AREA URNS AUTO: 0 /HPF (ref 0–4)
SP GR UR STRIP.AUTO: 1.01 (ref 1–1.03)
UA DIPSTICK W REFLEX MICRO PNL UR: YES
URN SPEC COLLECT METH UR: ABNORMAL
UROBILINOGEN UR STRIP-ACNC: 0.2 E.U./DL
WBC #/AREA URNS AUTO: 44 /HPF (ref 0–5)

## 2025-04-06 PROCEDURE — 96372 THER/PROPH/DIAG INJ SC/IM: CPT

## 2025-04-06 PROCEDURE — 6370000000 HC RX 637 (ALT 250 FOR IP): Performed by: PHYSICIAN ASSISTANT

## 2025-04-06 PROCEDURE — 81001 URINALYSIS AUTO W/SCOPE: CPT

## 2025-04-06 PROCEDURE — 99284 EMERGENCY DEPT VISIT MOD MDM: CPT

## 2025-04-06 PROCEDURE — 6360000002 HC RX W HCPCS: Performed by: PHYSICIAN ASSISTANT

## 2025-04-06 RX ORDER — KETOROLAC TROMETHAMINE 30 MG/ML
30 INJECTION, SOLUTION INTRAMUSCULAR; INTRAVENOUS ONCE
Status: COMPLETED | OUTPATIENT
Start: 2025-04-06 | End: 2025-04-06

## 2025-04-06 RX ORDER — OXYCODONE HYDROCHLORIDE 15 MG/1
15 TABLET ORAL EVERY 6 HOURS PRN
Qty: 110 TABLET | Refills: 0 | Status: SHIPPED | OUTPATIENT
Start: 2025-04-06 | End: 2025-05-06

## 2025-04-06 RX ORDER — ACETAMINOPHEN 325 MG/1
650 TABLET ORAL ONCE
Status: COMPLETED | OUTPATIENT
Start: 2025-04-06 | End: 2025-04-06

## 2025-04-06 RX ORDER — CEPHALEXIN 500 MG/1
500 CAPSULE ORAL 2 TIMES DAILY
Qty: 14 CAPSULE | Refills: 0 | Status: SHIPPED | OUTPATIENT
Start: 2025-04-06 | End: 2025-04-13

## 2025-04-06 RX ADMIN — ACETAMINOPHEN 650 MG: 325 TABLET ORAL at 14:10

## 2025-04-06 RX ADMIN — OXYCODONE 15 MG: 5 TABLET ORAL at 14:10

## 2025-04-06 RX ADMIN — KETOROLAC TROMETHAMINE 30 MG: 30 INJECTION, SOLUTION INTRAMUSCULAR at 14:10

## 2025-04-06 ASSESSMENT — PAIN SCALES - GENERAL
PAINLEVEL_OUTOF10: 10
PAINLEVEL_OUTOF10: 10

## 2025-04-06 ASSESSMENT — PAIN - FUNCTIONAL ASSESSMENT
PAIN_FUNCTIONAL_ASSESSMENT: 0-10
PAIN_FUNCTIONAL_ASSESSMENT: NONE - DENIES PAIN
PAIN_FUNCTIONAL_ASSESSMENT: PREVENTS OR INTERFERES SOME ACTIVE ACTIVITIES AND ADLS

## 2025-04-06 ASSESSMENT — PAIN DESCRIPTION - DESCRIPTORS: DESCRIPTORS: ACHING

## 2025-04-06 ASSESSMENT — PAIN DESCRIPTION - LOCATION
LOCATION: BACK
LOCATION: BACK

## 2025-04-06 NOTE — ED PROVIDER NOTES
CONDITION Stable           PATIENT REFERRED TO:  Canelo Galo MD  [address]   [address]  [facility] [id number] [phone]    Schedule an appointment as soon as possible for a visit in 1 day  for reevaluation    [facility] Emergency Department  [address] [address]  [phone]  Go to   As needed, If symptoms worsen      DISCHARGE MEDICATIONS:  New Prescriptions    CEPHALEXIN (KEFLEX) 500 MG CAPSULE    Take 1 capsule by mouth 2 times daily for 7 days       DISCONTINUED MEDICATIONS:  Discontinued Medications    No medications on file              (Please note that portions of this note were completed with a voice recognition program.  Efforts were made to edit the dictations but occasionally words are mis-transcribed.)    Vivian Rivera PA-C (electronically signed)       Vivian Rivera PA-C  04/06/25 [id number]

## 2025-04-06 NOTE — ED TRIAGE NOTES
Patient presents with increased back pain for the past couple of weeks. She denies loss of bowel or bladder function. She reports that approximately a month ago she had a fall during an MRI and her chronic back pain was exacerbated. She states that her typical pain medicines are not helping her pain. Patient was assisted to bed with RN X 2. Side rails up x 2, call light given to patient and instructed patient to call if she needs anything before getting up. Updated PA.

## 2025-04-07 RX ORDER — OXYCODONE HYDROCHLORIDE 15 MG/1
TABLET ORAL
Qty: 110 TABLET | Refills: 0 | OUTPATIENT
Start: 2025-04-07

## 2025-04-23 ENCOUNTER — OFFICE VISIT (OUTPATIENT)
Dept: INTERNAL MEDICINE CLINIC | Age: 60
End: 2025-04-23
Payer: MEDICARE

## 2025-04-23 VITALS
DIASTOLIC BLOOD PRESSURE: 98 MMHG | OXYGEN SATURATION: 100 % | SYSTOLIC BLOOD PRESSURE: 186 MMHG | BODY MASS INDEX: 22.3 KG/M2 | WEIGHT: 151 LBS | HEART RATE: 60 BPM

## 2025-04-23 DIAGNOSIS — I48.91 ATRIAL FIBRILLATION WITH RAPID VENTRICULAR RESPONSE (HCC): ICD-10-CM

## 2025-04-23 DIAGNOSIS — G89.29 CHRONIC PAIN OF LEFT LOWER EXTREMITY: ICD-10-CM

## 2025-04-23 DIAGNOSIS — G43.109 MIGRAINE WITH AURA AND WITHOUT STATUS MIGRAINOSUS, NOT INTRACTABLE: Primary | ICD-10-CM

## 2025-04-23 DIAGNOSIS — M79.605 CHRONIC PAIN OF LEFT LOWER EXTREMITY: ICD-10-CM

## 2025-04-23 PROCEDURE — 3080F DIAST BP >= 90 MM HG: CPT | Performed by: STUDENT IN AN ORGANIZED HEALTH CARE EDUCATION/TRAINING PROGRAM

## 2025-04-23 PROCEDURE — 1036F TOBACCO NON-USER: CPT | Performed by: STUDENT IN AN ORGANIZED HEALTH CARE EDUCATION/TRAINING PROGRAM

## 2025-04-23 PROCEDURE — 3017F COLORECTAL CA SCREEN DOC REV: CPT | Performed by: STUDENT IN AN ORGANIZED HEALTH CARE EDUCATION/TRAINING PROGRAM

## 2025-04-23 PROCEDURE — G8427 DOCREV CUR MEDS BY ELIG CLIN: HCPCS | Performed by: STUDENT IN AN ORGANIZED HEALTH CARE EDUCATION/TRAINING PROGRAM

## 2025-04-23 PROCEDURE — G8420 CALC BMI NORM PARAMETERS: HCPCS | Performed by: STUDENT IN AN ORGANIZED HEALTH CARE EDUCATION/TRAINING PROGRAM

## 2025-04-23 PROCEDURE — 99213 OFFICE O/P EST LOW 20 MIN: CPT | Performed by: STUDENT IN AN ORGANIZED HEALTH CARE EDUCATION/TRAINING PROGRAM

## 2025-04-23 PROCEDURE — 3077F SYST BP >= 140 MM HG: CPT | Performed by: STUDENT IN AN ORGANIZED HEALTH CARE EDUCATION/TRAINING PROGRAM

## 2025-04-23 RX ORDER — METOPROLOL TARTRATE 25 MG/1
25 TABLET, FILM COATED ORAL 2 TIMES DAILY
Qty: 60 TABLET | Refills: 3 | Status: SHIPPED | OUTPATIENT
Start: 2025-04-23

## 2025-04-23 RX ORDER — OXYCODONE HYDROCHLORIDE 15 MG/1
15 TABLET ORAL EVERY 6 HOURS PRN
Qty: 110 TABLET | Refills: 0 | Status: SHIPPED | OUTPATIENT
Start: 2025-04-23 | End: 2025-05-23

## 2025-04-23 RX ORDER — METHOCARBAMOL 500 MG/1
500 TABLET, FILM COATED ORAL 4 TIMES DAILY PRN
Qty: 1 TABLET | Refills: 0 | Status: SHIPPED | OUTPATIENT
Start: 2025-04-23 | End: 2025-04-24 | Stop reason: SDUPTHER

## 2025-04-23 RX ORDER — GABAPENTIN 300 MG/1
300 CAPSULE ORAL 3 TIMES DAILY
Qty: 90 CAPSULE | Refills: 5 | Status: SHIPPED | OUTPATIENT
Start: 2025-04-23 | End: 2025-10-20

## 2025-04-23 RX ORDER — LEVETIRACETAM 500 MG/1
500 TABLET ORAL EVERY 12 HOURS SCHEDULED
Qty: 60 TABLET | Refills: 11 | Status: SHIPPED | OUTPATIENT
Start: 2025-04-23
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
## 2025-04-24 ENCOUNTER — TELEPHONE (OUTPATIENT)
Dept: INTERNAL MEDICINE CLINIC | Age: 60
End: 2025-04-24

## 2025-04-24 NOTE — TELEPHONE ENCOUNTER
Piter from [facility] pharmacy called, she would like to speak with you about the Rx for   oxyCODONE (OXY-IR) 15 MG immediate release tablet     She has concerns about filling the prescription, the pharmacist needs to personally speak with Dr. Galo    Please advise    Thank you    Piter:[phone]

## 2025-04-24 NOTE — TELEPHONE ENCOUNTER
Medication:   Requested Prescriptions     Pending Prescriptions Disp Refills    methocarbamol (ROBAXIN) 500 MG tablet  0     Sig: Take 1 tablet by mouth 4 times daily as needed (spasms)       Last Filled:      Patient Phone Number: There are no phone numbers on file.    Last appt: 4/23/2025   Next appt: 7/1/2025        You only sent a quantity of 1 tab

## 2025-04-25 RX ORDER — METHOCARBAMOL 500 MG/1
500 TABLET, FILM COATED ORAL 4 TIMES DAILY PRN
Qty: 120 TABLET | Refills: 3 | Status: SHIPPED | OUTPATIENT
Start: 2025-04-25

## 2025-05-27 DIAGNOSIS — M79.605 CHRONIC PAIN OF LEFT LOWER EXTREMITY: ICD-10-CM

## 2025-05-27 DIAGNOSIS — G89.29 CHRONIC PAIN OF LEFT LOWER EXTREMITY: ICD-10-CM

## 2025-05-28 RX ORDER — OXYCODONE HYDROCHLORIDE 15 MG/1
15 TABLET ORAL EVERY 6 HOURS PRN
Qty: 110 TABLET | Refills: 0 | Status: SHIPPED | OUTPATIENT
Start: 2025-05-28 | End: 2025-06-27

## 2025-05-28 NOTE — TELEPHONE ENCOUNTER
Spoke with pharmacy regarding patient's scripts.  We are no longer about to provide opiate pain medication in this office. We will either need to wean down or refer to pain management. For continued pain treatment, please call [phone] to schedule with pain management.

## 2025-05-28 NOTE — TELEPHONE ENCOUNTER
Future Appointments   Date Time Provider Department Center   7/1/2025  1:00 PM Canelo Galo MD [facility] BS ECC DEP       Last appt 4/23/25

## 2025-06-03 DIAGNOSIS — M79.605 CHRONIC PAIN OF LEFT LOWER EXTREMITY: ICD-10-CM

## 2025-06-03 DIAGNOSIS — G89.29 CHRONIC PAIN OF LEFT LOWER EXTREMITY: ICD-10-CM

## 2025-06-03 RX ORDER — OXYCODONE HYDROCHLORIDE 15 MG/1
TABLET ORAL
Qty: 110 TABLET | Refills: 0 | OUTPATIENT
Start: 2025-06-03

## 2025-06-03 RX ORDER — OXYCODONE HYDROCHLORIDE 15 MG/1
15 TABLET ORAL EVERY 6 HOURS PRN
Qty: 110 TABLET | Refills: 0 | Status: SHIPPED | OUTPATIENT
Start: 2025-06-03 | End: 2025-07-03

## 2025-06-03 NOTE — TELEPHONE ENCOUNTER
Future Appointments   Date Time Provider Department Center   7/1/2025  1:00 PM Canelo Galo MD [facility] ECC DEP

## 2025-06-03 NOTE — TELEPHONE ENCOUNTER
We are no longer able to write opiates in primary care. I will send this script and then needs to see pain management. Call  to schedule with pain management

## 2025-06-04 DIAGNOSIS — M79.605 CHRONIC PAIN OF LEFT LOWER EXTREMITY: Primary | ICD-10-CM

## 2025-06-04 DIAGNOSIS — G89.29 CHRONIC PAIN OF LEFT LOWER EXTREMITY: Primary | ICD-10-CM

## 2025-06-20 DIAGNOSIS — G43.109 MIGRAINE WITH AURA AND WITHOUT STATUS MIGRAINOSUS, NOT INTRACTABLE: ICD-10-CM

## 2025-06-20 RX ORDER — GABAPENTIN 300 MG/1
300 CAPSULE ORAL 3 TIMES DAILY
Qty: 90 CAPSULE | Refills: 5 | Status: SHIPPED | OUTPATIENT
Start: 2025-06-20 | End: 2025-12-17

## 2025-06-20 RX ORDER — RIZATRIPTAN BENZOATE 10 MG/1
10 TABLET ORAL DAILY PRN
Qty: 12 TABLET | Refills: 5 | Status: SHIPPED | OUTPATIENT
Start: 2025-06-20

## 2025-06-20 RX ORDER — LEVETIRACETAM 500 MG/1
500 TABLET ORAL EVERY 12 HOURS SCHEDULED
Qty: 60 TABLET | Refills: 11 | Status: SHIPPED | OUTPATIENT
Start: 2025-06-20

## 2025-06-20 NOTE — TELEPHONE ENCOUNTER
Medication:   Requested Prescriptions     Pending Prescriptions Disp Refills    gabapentin (NEURONTIN) 300 MG capsule 90 capsule 5     Sig: Take 1 capsule by mouth 3 times daily for 180 days.    levETIRAcetam (KEPPRA) 500 MG tablet 60 tablet 11     Sig: Take 1 tablet by mouth every 12 hours    rizatriptan (MAXALT) 10 MG tablet 12 tablet 5     Sig: Take 1 tablet by mouth daily as needed for Migraine May repeat in 2 hours if needed       Last Filled:      Patient Phone Number: There are no phone numbers on file.    Last appt: 4/23/2025   Next appt: 7/8/2025  Future Appointments   Date Time Provider Department Center   7/8/2025  1:00 PM Canelo Galo MD [facility] ECC DEP

## 2025-07-03 ENCOUNTER — TELEPHONE (OUTPATIENT)
Dept: INTERNAL MEDICINE CLINIC | Age: 60
End: 2025-07-03

## 2025-07-03 DIAGNOSIS — M79.605 CHRONIC PAIN OF LEFT LOWER EXTREMITY: Primary | ICD-10-CM

## 2025-07-03 DIAGNOSIS — G89.29 CHRONIC PAIN OF LEFT LOWER EXTREMITY: Primary | ICD-10-CM

## 2025-07-03 DIAGNOSIS — M51.369 DEGENERATION OF INTERVERTEBRAL DISC OF LUMBAR REGION, UNSPECIFIED WHETHER PAIN PRESENT: ICD-10-CM

## 2025-07-03 NOTE — TELEPHONE ENCOUNTER
Need a new referral for pain clinic  The other pain clinic wouldn't take them     [facility]  [address] [place] [address]  [place]    [phone]  Fax to [phone].    Please call patient when done.

## 2025-07-15 ENCOUNTER — TELEPHONE (OUTPATIENT)
Dept: INTERNAL MEDICINE CLINIC | Age: 60
End: 2025-07-15

## 2025-07-15 NOTE — TELEPHONE ENCOUNTER
Pt called, she is having trouble getting into pain management    She is requesting a refill for oxyCODONE (OXY-IR) 15 MG immediate release tablet, 10 day Rx    Please advise    Thank you

## 2025-07-20 ENCOUNTER — APPOINTMENT (OUTPATIENT)
Dept: GENERAL RADIOLOGY | Age: 60
End: 2025-07-20
Payer: MEDICARE

## 2025-07-20 ENCOUNTER — APPOINTMENT (OUTPATIENT)
Dept: CT IMAGING | Age: 60
End: 2025-07-20
Payer: MEDICARE

## 2025-07-20 ENCOUNTER — HOSPITAL ENCOUNTER (EMERGENCY)
Age: 60
Discharge: HOME | End: 2025-07-20
Payer: MEDICARE

## 2025-07-20 VITALS
DIASTOLIC BLOOD PRESSURE: 98 MMHG | RESPIRATION RATE: 15 BRPM | SYSTOLIC BLOOD PRESSURE: 156 MMHG | OXYGEN SATURATION: 98 % | TEMPERATURE: 98.2 F | HEART RATE: 71 BPM

## 2025-07-20 DIAGNOSIS — M54.6 THORACIC BACK PAIN, UNSPECIFIED BACK PAIN LATERALITY, UNSPECIFIED CHRONICITY: ICD-10-CM

## 2025-07-20 DIAGNOSIS — S09.90XA INJURY OF HEAD, INITIAL ENCOUNTER: ICD-10-CM

## 2025-07-20 DIAGNOSIS — S93.402A SPRAIN OF LEFT ANKLE, UNSPECIFIED LIGAMENT, INITIAL ENCOUNTER: Primary | ICD-10-CM

## 2025-07-20 DIAGNOSIS — S93.602A SPRAIN OF LEFT FOOT, INITIAL ENCOUNTER: ICD-10-CM

## 2025-07-20 DIAGNOSIS — M54.50 LOW BACK PAIN, UNSPECIFIED BACK PAIN LATERALITY, UNSPECIFIED CHRONICITY, UNSPECIFIED WHETHER SCIATICA PRESENT: ICD-10-CM

## 2025-07-20 DIAGNOSIS — R03.0 ELEVATED BLOOD PRESSURE READING: ICD-10-CM

## 2025-07-20 PROCEDURE — 70450 CT HEAD/BRAIN W/O DYE: CPT

## 2025-07-20 PROCEDURE — 6360000002 HC RX W HCPCS: Performed by: PHYSICIAN ASSISTANT

## 2025-07-20 PROCEDURE — 73630 X-RAY EXAM OF FOOT: CPT

## 2025-07-20 PROCEDURE — 96372 THER/PROPH/DIAG INJ SC/IM: CPT

## 2025-07-20 PROCEDURE — 72125 CT NECK SPINE W/O DYE: CPT

## 2025-07-20 PROCEDURE — 72072 X-RAY EXAM THORAC SPINE 3VWS: CPT

## 2025-07-20 PROCEDURE — 6370000000 HC RX 637 (ALT 250 FOR IP): Performed by: PHYSICIAN ASSISTANT

## 2025-07-20 PROCEDURE — 72100 X-RAY EXAM L-S SPINE 2/3 VWS: CPT

## 2025-07-20 PROCEDURE — 99284 EMERGENCY DEPT VISIT MOD MDM: CPT

## 2025-07-20 PROCEDURE — 73590 X-RAY EXAM OF LOWER LEG: CPT

## 2025-07-20 RX ORDER — ONDANSETRON 4 MG/1
4 TABLET, ORALLY DISINTEGRATING ORAL ONCE
Status: COMPLETED | OUTPATIENT
Start: 2025-07-20 | End: 2025-07-20

## 2025-07-20 RX ORDER — KETOROLAC TROMETHAMINE 30 MG/ML
30 INJECTION, SOLUTION INTRAMUSCULAR; INTRAVENOUS ONCE
Status: COMPLETED | OUTPATIENT
Start: 2025-07-20 | End: 2025-07-20

## 2025-07-20 RX ORDER — ORPHENADRINE CITRATE 30 MG/ML
60 INJECTION INTRAMUSCULAR; INTRAVENOUS ONCE
Status: COMPLETED | OUTPATIENT
Start: 2025-07-20 | End: 2025-07-20

## 2025-07-20 RX ADMIN — ORPHENADRINE CITRATE 60 MG: 60 INJECTION INTRAMUSCULAR; INTRAVENOUS at 19:19

## 2025-07-20 RX ADMIN — OXYCODONE 15 MG: 5 TABLET ORAL at 15:25

## 2025-07-20 RX ADMIN — ONDANSETRON 4 MG: 4 TABLET, ORALLY DISINTEGRATING ORAL at 15:25

## 2025-07-20 RX ADMIN — KETOROLAC TROMETHAMINE 30 MG: 30 INJECTION, SOLUTION INTRAMUSCULAR at 19:18

## 2025-07-20 ASSESSMENT — PAIN DESCRIPTION - DESCRIPTORS
DESCRIPTORS: PATIENT UNABLE TO DESCRIBE
DESCRIPTORS: ACHING

## 2025-07-20 ASSESSMENT — PAIN DESCRIPTION - ONSET: ONSET: ON-GOING

## 2025-07-20 ASSESSMENT — PAIN - FUNCTIONAL ASSESSMENT
PAIN_FUNCTIONAL_ASSESSMENT: 0-10
PAIN_FUNCTIONAL_ASSESSMENT: 0-10

## 2025-07-20 ASSESSMENT — PAIN DESCRIPTION - PAIN TYPE: TYPE: ACUTE PAIN;CHRONIC PAIN

## 2025-07-20 ASSESSMENT — PAIN DESCRIPTION - LOCATION
LOCATION: BACK
LOCATION: GENERALIZED

## 2025-07-20 ASSESSMENT — PAIN DESCRIPTION - ORIENTATION: ORIENTATION: LEFT

## 2025-07-20 ASSESSMENT — PAIN SCALES - GENERAL
PAINLEVEL_OUTOF10: 10
PAINLEVEL_OUTOF10: 10

## 2025-07-20 ASSESSMENT — PAIN DESCRIPTION - FREQUENCY: FREQUENCY: CONTINUOUS

## 2025-07-20 NOTE — ED PROVIDER NOTES
an appointment as soon as possible for a visit   for reevaluation    [facility]  [address]  [facility] [id number] [phone]  Schedule an appointment as soon as possible for a visit   for reevaluation    [facility] Emergency Department  [address] [address]  [phone]    As needed, If symptoms worsen      DISCHARGE MEDICATIONS:  Discharge Medication List as of 7/20/2025  7:16 PM          DISCONTINUED MEDICATIONS:  Discharge Medication List as of 7/20/2025  7:16 PM                 (Please note that portions of this note were completed with a voice recognition program.  Efforts were made to edit the dictations but occasionally words are mis-transcribed.)    Debby Mulligan PA-C (electronically signed)

## 2025-07-24 ENCOUNTER — OFFICE VISIT (OUTPATIENT)
Dept: ORTHOPEDIC SURGERY | Age: 60
End: 2025-07-24
Payer: MEDICARE

## 2025-07-24 VITALS — BODY MASS INDEX: 22.36 KG/M2 | HEIGHT: 69 IN | WEIGHT: 151 LBS

## 2025-07-24 DIAGNOSIS — M54.50 ACUTE LOW BACK PAIN, UNSPECIFIED BACK PAIN LATERALITY, UNSPECIFIED WHETHER SCIATICA PRESENT: ICD-10-CM

## 2025-07-24 DIAGNOSIS — M47.816 LUMBAR SPONDYLOSIS: Primary | ICD-10-CM

## 2025-07-24 PROCEDURE — G8427 DOCREV CUR MEDS BY ELIG CLIN: HCPCS | Performed by: PHYSICIAN ASSISTANT

## 2025-07-24 PROCEDURE — G8420 CALC BMI NORM PARAMETERS: HCPCS | Performed by: PHYSICIAN ASSISTANT

## 2025-07-24 PROCEDURE — 99203 OFFICE O/P NEW LOW 30 MIN: CPT | Performed by: PHYSICIAN ASSISTANT

## 2025-07-24 PROCEDURE — 3017F COLORECTAL CA SCREEN DOC REV: CPT | Performed by: PHYSICIAN ASSISTANT

## 2025-07-24 PROCEDURE — 1036F TOBACCO NON-USER: CPT | Performed by: PHYSICIAN ASSISTANT
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
## 2025-08-08 DIAGNOSIS — M79.605 CHRONIC PAIN OF LEFT LOWER EXTREMITY: Primary | ICD-10-CM

## 2025-08-08 DIAGNOSIS — G89.29 CHRONIC PAIN OF LEFT LOWER EXTREMITY: Primary | ICD-10-CM

## 2025-08-11 ENCOUNTER — HOSPITAL ENCOUNTER (EMERGENCY)
Age: 60
Discharge: HOME | End: 2025-08-11
Payer: MEDICARE

## 2025-08-11 VITALS
WEIGHT: 157 LBS | HEART RATE: 50 BPM | TEMPERATURE: 98.2 F | HEIGHT: 70 IN | BODY MASS INDEX: 22.48 KG/M2 | OXYGEN SATURATION: 98 % | DIASTOLIC BLOOD PRESSURE: 119 MMHG | SYSTOLIC BLOOD PRESSURE: 183 MMHG | RESPIRATION RATE: 16 BRPM

## 2025-08-11 DIAGNOSIS — G89.29 ACUTE EXACERBATION OF CHRONIC LOW BACK PAIN: Primary | ICD-10-CM

## 2025-08-11 DIAGNOSIS — M54.50 ACUTE EXACERBATION OF CHRONIC LOW BACK PAIN: Primary | ICD-10-CM

## 2025-08-11 PROCEDURE — 6370000000 HC RX 637 (ALT 250 FOR IP): Performed by: PHYSICIAN ASSISTANT

## 2025-08-11 PROCEDURE — 99283 EMERGENCY DEPT VISIT LOW MDM: CPT

## 2025-08-11 RX ORDER — ONDANSETRON 4 MG/1
4 TABLET, ORALLY DISINTEGRATING ORAL EVERY 8 HOURS PRN
Qty: 10 TABLET | Refills: 0 | Status: SHIPPED | OUTPATIENT
Start: 2025-08-11

## 2025-08-11 RX ORDER — OXYCODONE AND ACETAMINOPHEN 5; 325 MG/1; MG/1
1 TABLET ORAL ONCE
Refills: 0 | Status: COMPLETED | OUTPATIENT
Start: 2025-08-11 | End: 2025-08-11

## 2025-08-11 RX ORDER — OXYCODONE AND ACETAMINOPHEN 5; 325 MG/1; MG/1
1 TABLET ORAL EVERY 6 HOURS PRN
Qty: 20 TABLET | Refills: 0 | Status: SHIPPED | OUTPATIENT
Start: 2025-08-11 | End: 2025-08-21

## 2025-08-11 RX ORDER — OXYCODONE AND ACETAMINOPHEN 10; 325 MG/1; MG/1
1 TABLET ORAL ONCE
Refills: 0 | Status: COMPLETED | OUTPATIENT
Start: 2025-08-11 | End: 2025-08-11

## 2025-08-11 RX ORDER — METHOCARBAMOL 500 MG/1
500 TABLET, FILM COATED ORAL EVERY 8 HOURS PRN
Qty: 30 TABLET | Refills: 0 | Status: SHIPPED | OUTPATIENT
Start: 2025-08-11 | End: 2025-08-21

## 2025-08-11 RX ORDER — ONDANSETRON 4 MG/1
4 TABLET, ORALLY DISINTEGRATING ORAL ONCE
Status: COMPLETED | OUTPATIENT
Start: 2025-08-11 | End: 2025-08-11

## 2025-08-11 RX ORDER — METHOCARBAMOL 500 MG/1
500 TABLET, FILM COATED ORAL ONCE
Status: COMPLETED | OUTPATIENT
Start: 2025-08-11 | End: 2025-08-11

## 2025-08-11 RX ADMIN — OXYCODONE AND ACETAMINOPHEN 1 TABLET: 10; 325 TABLET ORAL at 19:21

## 2025-08-11 RX ADMIN — ONDANSETRON 4 MG: 4 TABLET, ORALLY DISINTEGRATING ORAL at 21:11

## 2025-08-11 RX ADMIN — OXYCODONE AND ACETAMINOPHEN 1 TABLET: 5; 325 TABLET ORAL at 21:44

## 2025-08-11 RX ADMIN — METHOCARBAMOL 500 MG: 500 TABLET ORAL at 19:21

## 2025-08-11 ASSESSMENT — PAIN DESCRIPTION - ONSET: ONSET: ON-GOING

## 2025-08-11 ASSESSMENT — PAIN - FUNCTIONAL ASSESSMENT
PAIN_FUNCTIONAL_ASSESSMENT: 0-10
PAIN_FUNCTIONAL_ASSESSMENT: ACTIVITIES ARE NOT PREVENTED

## 2025-08-11 ASSESSMENT — PAIN DESCRIPTION - PAIN TYPE: TYPE: CHRONIC PAIN

## 2025-08-11 ASSESSMENT — PAIN DESCRIPTION - LOCATION
LOCATION: BACK
LOCATION: BACK

## 2025-08-11 ASSESSMENT — PAIN DESCRIPTION - FREQUENCY: FREQUENCY: CONTINUOUS

## 2025-08-11 ASSESSMENT — PAIN SCALES - GENERAL
PAINLEVEL_OUTOF10: 10
PAINLEVEL_OUTOF10: 10

## 2025-08-11 ASSESSMENT — PAIN DESCRIPTION - DESCRIPTORS: DESCRIPTORS: DISCOMFORT

## 2025-08-28 ENCOUNTER — HOSPITAL ENCOUNTER (EMERGENCY)
Age: 60
Discharge: HOME | End: 2025-08-28
Payer: MEDICARE

## 2025-08-28 VITALS
TEMPERATURE: 98.6 F | RESPIRATION RATE: 13 BRPM | HEART RATE: 59 BPM | SYSTOLIC BLOOD PRESSURE: 152 MMHG | BODY MASS INDEX: 23.18 KG/M2 | OXYGEN SATURATION: 97 % | HEIGHT: 69 IN | DIASTOLIC BLOOD PRESSURE: 103 MMHG

## 2025-08-28 DIAGNOSIS — Z76.5 DRUG-SEEKING BEHAVIOR: ICD-10-CM

## 2025-08-28 DIAGNOSIS — G89.29 CHRONIC BILATERAL BACK PAIN, UNSPECIFIED BACK LOCATION: Primary | ICD-10-CM

## 2025-08-28 DIAGNOSIS — M54.9 CHRONIC BILATERAL BACK PAIN, UNSPECIFIED BACK LOCATION: Primary | ICD-10-CM

## 2025-08-28 PROCEDURE — 6360000002 HC RX W HCPCS: Performed by: PHYSICIAN ASSISTANT

## 2025-08-28 PROCEDURE — 96372 THER/PROPH/DIAG INJ SC/IM: CPT

## 2025-08-28 PROCEDURE — 99284 EMERGENCY DEPT VISIT MOD MDM: CPT

## 2025-08-28 PROCEDURE — 6370000000 HC RX 637 (ALT 250 FOR IP): Performed by: PHYSICIAN ASSISTANT

## 2025-08-28 RX ORDER — KETOROLAC TROMETHAMINE 30 MG/ML
30 INJECTION, SOLUTION INTRAMUSCULAR; INTRAVENOUS ONCE
Status: COMPLETED | OUTPATIENT
Start: 2025-08-28 | End: 2025-08-28

## 2025-08-28 RX ADMIN — KETOROLAC TROMETHAMINE 30 MG: 30 INJECTION, SOLUTION INTRAMUSCULAR at 18:43

## 2025-08-28 RX ADMIN — OXYCODONE HYDROCHLORIDE 15 MG: 10 TABLET ORAL at 18:43

## 2025-08-28 ASSESSMENT — ENCOUNTER SYMPTOMS
COLOR CHANGE: 0
SHORTNESS OF BREATH: 0
ABDOMINAL DISTENTION: 0
ABDOMINAL PAIN: 0
VOMITING: 0
BACK PAIN: 1
CONSTIPATION: 0

## 2025-08-28 ASSESSMENT — PAIN - FUNCTIONAL ASSESSMENT: PAIN_FUNCTIONAL_ASSESSMENT: 0-10

## 2025-08-28 ASSESSMENT — PAIN SCALES - GENERAL: PAINLEVEL_OUTOF10: 10

## 2025-09-02 ENCOUNTER — TELEPHONE (OUTPATIENT)
Dept: INTERNAL MEDICINE CLINIC | Age: 60
End: 2025-09-02

## 2025-09-02 RX ORDER — CELECOXIB 200 MG/1
200 CAPSULE ORAL DAILY
Qty: 60 CAPSULE | Refills: 3 | Status: SHIPPED | OUTPATIENT
Start: 2025-09-02